# Patient Record
Sex: FEMALE | Race: WHITE | Employment: OTHER | URBAN - METROPOLITAN AREA
[De-identification: names, ages, dates, MRNs, and addresses within clinical notes are randomized per-mention and may not be internally consistent; named-entity substitution may affect disease eponyms.]

---

## 2017-01-13 ENCOUNTER — OFFICE VISIT (OUTPATIENT)
Dept: FAMILY MEDICINE CLINIC | Age: 62
End: 2017-01-13

## 2017-01-13 VITALS
DIASTOLIC BLOOD PRESSURE: 58 MMHG | RESPIRATION RATE: 16 BRPM | OXYGEN SATURATION: 93 % | HEART RATE: 104 BPM | HEIGHT: 64 IN | SYSTOLIC BLOOD PRESSURE: 112 MMHG | BODY MASS INDEX: 26.46 KG/M2 | TEMPERATURE: 98.5 F | WEIGHT: 155 LBS

## 2017-01-13 DIAGNOSIS — R05.9 COUGH: Primary | ICD-10-CM

## 2017-01-13 DIAGNOSIS — J11.1 INFLUENZA: ICD-10-CM

## 2017-01-13 NOTE — MR AVS SNAPSHOT
Visit Information Date & Time Provider Department Dept. Phone Encounter #  
 1/13/2017  9:45 AM Jarrell Mitchell MD [address] [place] [address] [phone] [id number] Follow-up Instructions Return if symptoms worsen or fail to improve. Upcoming Health Maintenance Date Due Hepatitis C Screening 1955 INFLUENZA AGE 9 TO ADULT 8/1/2016 COLONOSCOPY 1/1/2018 BREAST CANCER SCRN MAMMOGRAM 6/22/2018 PAP AKA CERVICAL CYTOLOGY 8/24/2019 DTaP/Tdap/Td series (2 - Td) 10/6/2025 Allergies as of 1/13/2017  Review Complete On: 1/13/2017 By: Shawanda Silva LPN Severity Noted Reaction Type Reactions Erythromycin  09/13/2016    Nausea Only Current Immunizations  Never Reviewed Name Date Influenza Vaccine 10/6/2015, 10/27/2014 Tdap 10/6/2015 Zoster Vaccine, Live 1/1/2012 Not reviewed this visit You Were Diagnosed With   
  
 Codes Comments Cough    -  Primary ICD-10-CM: C40 ICD-9-CM: 198. 2 Vitals BP Pulse Temp Resp Height(growth percentile) Weight(growth percentile) 112/58 (!) 104 98.5 °F (36.9 °C) (Oral) 16 5' 4\" (1.626 m) 155 lb (70.3 kg) SpO2 BMI OB Status Smoking Status 93% 26.61 kg/m2 Postmenopausal Never Smoker BMI and BSA Data Body Mass Index Body Surface Area  
 26.61 kg/m 2 1.78 m 2 Preferred Pharmacy Pharmacy Name Phone [facility] [address] [place] [address] [address] [facility] [phone] Your Updated Medication List  
  
   
This list is accurate as of: 1/13/17 11:14 AM.  Always use your most recent med list. amLODIPine 5 mg tablet Commonly known as:  [place] Take 1 Tab by mouth daily. diclofenac EC 75 mg EC tablet Commonly known as:  VOLTAREN Take 1 Tab by mouth two (2) times daily as needed. hydroCHLOROthiazide 25 mg tablet Commonly known as:  HYDRODIURIL  
 Take 1 Tab by mouth daily. losartan 25 mg tablet Commonly known as:  COZAAR Take 1 Tab by mouth daily. omeprazole 20 mg capsule Commonly known as:  PRILOSEC Take 1 Cap by mouth daily. (attempting to wean off) Potassium Gluconate 595 mg (99 mg) tablet Take 1 Tab by mouth daily. Replaces Kdur due to cost  
  
 venlafaxine-SR 37.5 mg capsule Commonly known as:  EFFEXOR-XR TK 1 C PO D Follow-up Instructions Return if symptoms worsen or fail to improve. To-Do List   
 01/13/2017 Imaging:  XR CHEST PA LAT   
  
 01/13/2017 11:15 AM  
  Appointment with [facility] RAD XR RM 1 at [facility] ([phone]) Patient Instructions Cough: Care Instructions Your Care Instructions A cough is your body's response to something that bothers your throat or airways. Many things can cause a cough. You might cough because of a cold or the flu, bronchitis, or asthma. Smoking, postnasal drip, allergies, and stomach acid that backs up into your throat also can cause coughs. A cough is a symptom, not a disease. Most coughs stop when the cause, such as a cold, goes away. You can take a few steps at home to cough less and feel better. Follow-up care is a key part of your treatment and safety. Be sure to make and go to all appointments, and call your doctor if you are having problems. It's also a good idea to know your test results and keep a list of the medicines you take. How can you care for yourself at home? · Drink lots of water and other fluids. This helps thin the mucus and soothes a dry or sore throat. Honey or lemon juice in hot water or tea may ease a dry cough. · Take cough medicine as directed by your doctor. · Prop up your head on pillows to help you breathe and ease a dry cough. · Try cough drops to soothe a dry or sore throat. Cough drops don't stop a cough.  Medicine-flavored cough drops are no better than candy-flavored drops or hard candy. · Do not smoke. Avoid secondhand smoke. If you need help quitting, talk to your doctor about stop-smoking programs and medicines. These can increase your chances of quitting for good. When should you call for help? Call 911 anytime you think you may need emergency care. For example, call if: 
· You have severe trouble breathing. Call your doctor now or seek immediate medical care if: 
· You cough up blood. · You have new or worse trouble breathing. · You have a new or higher fever. · You have a new rash. Watch closely for changes in your health, and be sure to contact your doctor if: 
· You cough more deeply or more often, especially if you notice more mucus or a change in the color of your mucus. · You have new symptoms, such as a sore throat, an earache, or sinus pain. · You do not get better as expected. Where can you learn more? Go to http://wilberTalkyLandcj.info/. Enter D279 in the search box to learn more about \"Cough: Care Instructions. \" Current as of: May 27, 2016 Content Version: 11.1 © 1059-2667 [employer]dy[employer]. Care instructions adapted under license by [employer] (which disclaims liability or warranty for this information). If you have questions about a medical condition or this instruction, always ask your healthcare professional. [place] [address] any warranty or liability for your use of this information. Influenza (Flu): Care Instructions Your Care Instructions Influenza (flu) is an infection in the lungs and breathing passages. It is caused by the influenza virus. There are different strains, or types, of the flu virus from year to year. Unlike the common cold, the flu comes on suddenly and the symptoms, such as a cough, congestion, fever, chills, fatigue, aches, and pains, are more severe. These symptoms may last up to 10 days.  Although the flu can make you feel very sick, it usually doesn't cause serious health problems. Home treatment is usually all you need for flu symptoms. But your doctor may prescribe antiviral medicine to prevent other health problems, such as pneumonia, from developing. Older people and those who have a long-term health condition, such as lung disease, are most at risk for having pneumonia or other health problems. Follow-up care is a key part of your treatment and safety. Be sure to make and go to all appointments, and call your doctor if you are having problems. Its also a good idea to know your test results and keep a list of the medicines you take. How can you care for yourself at home? · Get plenty of rest. 
· Drink plenty of fluids, enough so that your urine is light yellow or clear like water. If you have kidney, heart, or liver disease and have to limit fluids, talk with your doctor before you increase the amount of fluids you drink. · Take an over-the-counter pain medicine if needed, such as acetaminophen (Tylenol), ibuprofen (Advil, Motrin), or naproxen (Aleve), to relieve fever, headache, and muscle aches. Read and follow all instructions on the label. No one younger than 20 should take aspirin. It has been linked to Reye syndrome, a serious illness. · Do not smoke. Smoking can make the flu worse. If you need help quitting, talk to your doctor about stop-smoking programs and medicines. These can increase your chances of quitting for good. · Breathe moist air from a hot shower or from a sink filled with hot water to help clear a stuffy nose. · Before you use cough and cold medicines, check the label. These medicines may not be safe for young children or for people with certain health problems. · If the skin around your nose and lips becomes sore, put some petroleum jelly on the area. · To ease coughing: ¨ Drink fluids to soothe a scratchy throat. ¨ Suck on cough drops or plain hard candy. ¨ Take an over-the-counter cough medicine that contains dextromethorphan to help you get some sleep. Read and follow all instructions on the label. ¨ Raise your head at night with an extra pillow. This may help you rest if coughing keeps you awake. · Take any prescribed medicine exactly as directed. Call your doctor if you think you are having a problem with your medicine. To avoid spreading the flu · Wash your hands regularly, and keep your hands away from your face. · Stay home from school, work, and other public places until you are feeling better and your fever has been gone for at least 24 hours. The fever needs to have gone away on its own without the help of medicine. · Ask people living with you to talk to their doctors about preventing the flu. They may get antiviral medicine to keep from getting the flu from you. · To prevent the flu in the future, get a flu vaccine every fall. Encourage people living with you to get the vaccine. · Cover your mouth when you cough or sneeze. When should you call for help? Call 911 anytime you think you may need emergency care. For example, call if: 
· You have severe trouble breathing. Call your doctor now or seek immediate medical care if: 
· You have new or worse trouble breathing. · You seem to be getting much sicker. · You feel very sleepy or confused. · You have a new or higher fever. · You get a new rash. Watch closely for changes in your health, and be sure to contact your doctor if: 
· You begin to get better and then get worse. · You are not getting better after 1 week. Where can you learn more? Go to http://wilber-cj.info/. Enter A046 in the search box to learn more about \"Influenza (Flu): Care Instructions. \" Current as of: May 23, 2016 Content Version: 11.1 © 0541-7323 [employer].  Care instructions adapted under license by [employer] (which disclaims liability or warranty for this information). If you have questions about a medical condition or this instruction, always ask your healthcare professional. [address] any warranty or liability for your use of this information. Introducing [facility] SERVICES! [facility] introduces [employer] patient portal. Now you can access parts of your medical record, email your doctor's office, and request medication refills online. 1. In your internet browser, go to [email or web address] 2. Click on the First Time User? Click Here link in the Sign In box. You will see the New Member Sign Up page. 3. Enter your [employer] Access Code exactly as it appears below. You will not need to use this code after youve completed the sign-up process. If you do not sign up before the expiration date, you must request a new code. · [employer] Access Code: [email or web address] Expires: 4/13/2017 11:14 AM 
 
4. Enter the last four digits of your Social Security Number (xxxx) and Date of Birth (mm/dd/yyyy) as indicated and click Submit. You will be taken to the next sign-up page. 5. Create a [employer] ID. This will be your [employer] login ID and cannot be changed, so think of one that is secure and easy to remember. 6. Create a [employer] password. You can change your password at any time. 7. Enter your Password Reset Question and Answer. This can be used at a later time if you forget your password. 8. Enter your e-mail address. You will receive e-mail notification when new information is available in [address] 9. Click Sign Up. You can now view and download portions of your medical record. 10. Click the Download Summary menu link to download a portable copy of your medical information. If you have questions, please visit the Frequently Asked Questions section of the [employer] website. Remember, [employer] is NOT to be used for urgent needs. For medical emergencies, dial 911. Now available from your iPhone and Android! Please provide this summary of care documentation to your next provider. Your primary care clinician is listed as Kathleen Warner. If you have any questions after today's visit, please call [phone].

## 2017-01-13 NOTE — PATIENT INSTRUCTIONS
Cough: Care Instructions  Your Care Instructions  A cough is your body's response to something that bothers your throat or airways. Many things can cause a cough. You might cough because of a cold or the flu, bronchitis, or asthma. Smoking, postnasal drip, allergies, and stomach acid that backs up into your throat also can cause coughs. A cough is a symptom, not a disease. Most coughs stop when the cause, such as a cold, goes away. You can take a few steps at home to cough less and feel better. Follow-up care is a key part of your treatment and safety. Be sure to make and go to all appointments, and call your doctor if you are having problems. It's also a good idea to know your test results and keep a list of the medicines you take. How can you care for yourself at home? · Drink lots of water and other fluids. This helps thin the mucus and soothes a dry or sore throat. Honey or lemon juice in hot water or tea may ease a dry cough. · Take cough medicine as directed by your doctor. · Prop up your head on pillows to help you breathe and ease a dry cough. · Try cough drops to soothe a dry or sore throat. Cough drops don't stop a cough. Medicine-flavored cough drops are no better than candy-flavored drops or hard candy. · Do not smoke. Avoid secondhand smoke. If you need help quitting, talk to your doctor about stop-smoking programs and medicines. These can increase your chances of quitting for good. When should you call for help? Call 911 anytime you think you may need emergency care. For example, call if:  · You have severe trouble breathing. Call your doctor now or seek immediate medical care if:  · You cough up blood. · You have new or worse trouble breathing. · You have a new or higher fever. · You have a new rash.   Watch closely for changes in your health, and be sure to contact your doctor if:  · You cough more deeply or more often, especially if you notice more mucus or a change in the color of your mucus. · You have new symptoms, such as a sore throat, an earache, or sinus pain. · You do not get better as expected. Where can you learn more? Go to http://wilber-cj.info/. Enter D279 in the search box to learn more about \"Cough: Care Instructions. \"  Current as of: May 27, 2016  Content Version: 11.1  © 2006-2016 King Solar[employer]. Care instructions adapted under license by [employer] (which disclaims liability or warranty for this information). If you have questions about a medical condition or this instruction, always ask your healthcare professional. [place] [address] any warranty or liability for your use of this information. Influenza (Flu): Care Instructions  Your Care Instructions  Influenza (flu) is an infection in the lungs and breathing passages. It is caused by the influenza virus. There are different strains, or types, of the flu virus from year to year. Unlike the common cold, the flu comes on suddenly and the symptoms, such as a cough, congestion, fever, chills, fatigue, aches, and pains, are more severe. These symptoms may last up to 10 days. Although the flu can make you feel very sick, it usually doesn't cause serious health problems. Home treatment is usually all you need for flu symptoms. But your doctor may prescribe antiviral medicine to prevent other health problems, such as pneumonia, from developing. Older people and those who have a long-term health condition, such as lung disease, are most at risk for having pneumonia or other health problems. Follow-up care is a key part of your treatment and safety. Be sure to make and go to all appointments, and call your doctor if you are having problems. Its also a good idea to know your test results and keep a list of the medicines you take. How can you care for yourself at home?   · Get plenty of rest.  · Drink plenty of fluids, enough so that your urine is light yellow or clear like water. If you have kidney, heart, or liver disease and have to limit fluids, talk with your doctor before you increase the amount of fluids you drink. · Take an over-the-counter pain medicine if needed, such as acetaminophen (Tylenol), ibuprofen (Advil, Motrin), or naproxen (Aleve), to relieve fever, headache, and muscle aches. Read and follow all instructions on the label. No one younger than 20 should take aspirin. It has been linked to Reye syndrome, a serious illness. · Do not smoke. Smoking can make the flu worse. If you need help quitting, talk to your doctor about stop-smoking programs and medicines. These can increase your chances of quitting for good. · Breathe moist air from a hot shower or from a sink filled with hot water to help clear a stuffy nose. · Before you use cough and cold medicines, check the label. These medicines may not be safe for young children or for people with certain health problems. · If the skin around your nose and lips becomes sore, put some petroleum jelly on the area. · To ease coughing:  ¨ Drink fluids to soothe a scratchy throat. ¨ Suck on cough drops or plain hard candy. ¨ Take an over-the-counter cough medicine that contains dextromethorphan to help you get some sleep. Read and follow all instructions on the label. ¨ Raise your head at night with an extra pillow. This may help you rest if coughing keeps you awake. · Take any prescribed medicine exactly as directed. Call your doctor if you think you are having a problem with your medicine. To avoid spreading the flu  · Wash your hands regularly, and keep your hands away from your face. · Stay home from school, work, and other public places until you are feeling better and your fever has been gone for at least 24 hours. The fever needs to have gone away on its own without the help of medicine. · Ask people living with you to talk to their doctors about preventing the flu.  They may get antiviral medicine to keep from getting the flu from you. · To prevent the flu in the future, get a flu vaccine every fall. Encourage people living with you to get the vaccine. · Cover your mouth when you cough or sneeze. When should you call for help? Call 911 anytime you think you may need emergency care. For example, call if:  · You have severe trouble breathing. Call your doctor now or seek immediate medical care if:  · You have new or worse trouble breathing. · You seem to be getting much sicker. · You feel very sleepy or confused. · You have a new or higher fever. · You get a new rash. Watch closely for changes in your health, and be sure to contact your doctor if:  · You begin to get better and then get worse. · You are not getting better after 1 week. Where can you learn more? Go to http://wilber-cj.info/. Enter S463 in the search box to learn more about \"Influenza (Flu): Care Instructions. \"  Current as of: May 23, 2016  Content Version: 11.1  © 2070-9339 [employer]. Care instructions adapted under license by [employer] (which disclaims liability or warranty for this information). If you have questions about a medical condition or this instruction, always ask your healthcare professional. [address] any warranty or liability for your use of this information.

## 2017-01-13 NOTE — PROGRESS NOTES
Chief Complaint   Patient presents with    Cough     times 10 days     1. Have you been to the ER, urgent care clinic since your last visit? Hospitalized since your last visit? No    2. Have you seen or consulted any other health care providers outside of the [address] [place] [address] since your last visit? Include any pap smears or colon screening.  No

## 2017-01-13 NOTE — PROGRESS NOTES
HPI  Kayla Mccarthy is a 57 y.o. female who presents for f/u of flu. Sxs of a cold and fatigue started on 1/2 and she was diagnosed with the flu at [facility] last Thursday 1/5 (she was tested for flu and strep). Was told it would take 2 weeks to go away. She feels the same since last Thursday. The most bothersome things are low energy and occasional coughing of phlegm. Occasional rumbling in her chest. Trouble getting to sleep at night. Hasn't had much of an appetite. No fevers, chills, SOB, abdominal pain. Has been taking BP medication irregularly while sick. Has been using coricidin and mucinex.  has a cough but not as bad as hers. This is first year she didn't get flu shot. Has h/o asthma set off by cats, dogs. No h/o pneumonia or recent hospitalizations. No tobacco, alcohol, drug use. Allergies: Allergies   Allergen Reactions    Erythromycin Nausea Only       Meds:   Current Outpatient Prescriptions   Medication Sig Dispense Refill    Potassium Gluconate 595 mg (99 mg) tablet Take 1 Tab by mouth daily. Replaces Kdur due to cost 90 Tab 3    venlafaxine-SR (EFFEXOR-XR) 37.5 mg capsule TK 1 C PO D  1    omeprazole (PRILOSEC) 20 mg capsule Take 1 Cap by mouth daily. (attempting to wean off) 30 Cap 6    hydrochlorothiazide (HYDRODIURIL) 25 mg tablet Take 1 Tab by mouth daily. 90 Tab 3    losartan (COZAAR) 25 mg tablet Take 1 Tab by mouth daily. 90 Tab 3    amLODIPine (NORVASC) 5 mg tablet Take 1 Tab by mouth daily. 90 Tab 3    diclofenac EC (VOLTAREN) 75 mg EC tablet Take 1 Tab by mouth two (2) times daily as needed.  27 Tab 6       PMH:  Past Medical History   Diagnosis Date    Hypertension        SH:  Smoker:  History   Smoking Status    Never Smoker   Smokeless Tobacco    Not on file       ETOH:   History   Alcohol Use    0.6 oz/week    1 Glasses of wine per week     Comment: rarely       FH:   Family History   Problem Relation Age of Onset    Breast Cancer Mother 59    Hypertension Father     Cancer Sister     Colon Cancer Sister     Psychiatric Disorder Brother      schizophrenia    Cancer Other     Colon Cancer Other        ROS: Positive for items in bold, otherwise reviewed and negative:   Constitutional: headache, fever, fatigue     Cardiac: chest pain      Resp: cough or shortness of breath      GI: nausea, vomiting, constipation, diarrhea    Physical Exam:  Visit Vitals    /58    Pulse (!) 104    Temp 98.5 °F (36.9 °C) (Oral)    Resp 16    Ht 5' 4\" (1.626 m)    Wt 155 lb (70.3 kg)    SpO2 93%    BMI 26.61 kg/m2       Gen: No apparent distress. Alert and oriented and responds to all questions appropriately. Eyes: Conjunctiva clear, PERRL, EOMI  Ear: External ears are normal. Hearing grossly normal b/l. Tympanic membranes are clear and without effusion. Nose: Turbinates are within normal limits. No drainage. Oropharynx: No oral lesions or exudates. Neck: Supple, no lymph nodes, masses, or thyromegaly appreciated  Lungs: Respirations unlabored, clear to auscultation bilaterally  Cardio: Regular, rate, and rhythm without murmurs, rubs, or gallops   Neuro: Gait grossly intact. CXR reviewed and did not appear to show pneumonia or other acute pathology. Assessment and Plan:   Virgen Abebe is a 57 y.o. female who presents for f/u of flu.    1. Influenza: Afebrile, reassuring PE. CXR negative for superimposed pneumonia. -Emphasized importance of supportive care  -Rtc prn if sxs worsen or fail to improve    Discussed diagnoses in detail with patient. Patient expressed understanding of and agreement to above plan. All questions and concerns addressed. Medication risks/benefits/side effects discussed with patient. Patient is counseled to return to the office and/or ED if symptoms do not improve as expected. Patient discussed with Dr. Malik Woodard, Attending Physician.     Fahad Alba MD  Family Medicine Resident, PGY-2      Encounter Diagnoses:    ICD-10-CM ICD-9-CM    1. Cough R05 786.2 XR CHEST PA LAT   2.  Influenza J11.1 487.1

## 2017-01-16 ENCOUNTER — TELEPHONE (OUTPATIENT)
Dept: FAMILY MEDICINE CLINIC | Age: 62
End: 2017-01-16

## 2017-01-16 NOTE — TELEPHONE ENCOUNTER
Patient called for update as leaving out of town today at 4:00 pm.  Ph [phone]   Moi Brasher she will have her cell phone with her.

## 2017-01-16 NOTE — TELEPHONE ENCOUNTER
Patient called stating that she had a CXR done on Friday and would like a return call with the results.

## 2017-01-28 RX ORDER — POTASSIUM CHLORIDE 750 MG/1
TABLET, EXTENDED RELEASE ORAL
Qty: 180 TAB | Refills: 0 | Status: SHIPPED | OUTPATIENT
Start: 2017-01-28 | End: 2017-05-08 | Stop reason: SDUPTHER

## 2017-05-08 RX ORDER — POTASSIUM CHLORIDE 750 MG/1
TABLET, EXTENDED RELEASE ORAL
Qty: 180 TAB | Refills: 0 | Status: SHIPPED | OUTPATIENT
Start: 2017-05-08 | End: 2017-08-04 | Stop reason: SDUPTHER

## 2017-05-19 ENCOUNTER — HOSPITAL ENCOUNTER (OUTPATIENT)
Dept: LAB | Age: 62
Discharge: HOME | End: 2017-05-19
Payer: COMMERCIAL

## 2017-05-19 ENCOUNTER — OFFICE VISIT (OUTPATIENT)
Dept: FAMILY MEDICINE CLINIC | Age: 62
End: 2017-05-19

## 2017-05-19 VITALS
SYSTOLIC BLOOD PRESSURE: 106 MMHG | TEMPERATURE: 98 F | HEIGHT: 64 IN | HEART RATE: 76 BPM | BODY MASS INDEX: 28.34 KG/M2 | RESPIRATION RATE: 16 BRPM | OXYGEN SATURATION: 97 % | WEIGHT: 166 LBS | DIASTOLIC BLOOD PRESSURE: 66 MMHG

## 2017-05-19 DIAGNOSIS — Z01.419 WELL WOMAN EXAM WITH ROUTINE GYNECOLOGICAL EXAM: Primary | ICD-10-CM

## 2017-05-19 DIAGNOSIS — M54.30 SCIATIC LEG PAIN: ICD-10-CM

## 2017-05-19 DIAGNOSIS — I10 ESSENTIAL HYPERTENSION: ICD-10-CM

## 2017-05-19 PROCEDURE — 88175 CYTOPATH C/V AUTO FLUID REDO: CPT | Performed by: FAMILY MEDICINE

## 2017-05-19 PROCEDURE — 87624 HPV HI-RISK TYP POOLED RSLT: CPT | Performed by: FAMILY MEDICINE

## 2017-05-19 NOTE — PROGRESS NOTES
Chief Complaint   Patient presents with    Complete Physical     1. Have you been to the ER, urgent care clinic since your last visit? Hospitalized since your last visit? No    2. Have you seen or consulted any other health care providers outside of the [address] [place] [address] since your last visit? Include any pap smears or colon screening.  No     Left leg sciatic nerve pain--3 years ago--going to chiropractor--Dr Leticia Storey    Skin cancer--basal cell--

## 2017-05-19 NOTE — PATIENT INSTRUCTIONS
Dr. Jae Licona   (Sports Medicine)     Piriformis Syndrome: Exercises  Your Care Instructions  Here are some examples of typical rehabilitation exercises for your condition. Start each exercise slowly. Ease off the exercise if you start to have pain. Your doctor or physical therapist will tell you when you can start these exercises and which ones will work best for you. How to do the exercises  Hip rotator stretch    1. Lie on your back with both knees bent and your feet flat on the floor. 2. Put the ankle of your affected leg on your opposite thigh near your knee. 3. Use your hand to gently push your knee (on your affected leg) away from your body until you feel a gentle stretch around your hip. 4. Hold the stretch for 15 to 30 seconds. 5. Repeat 2 to 4 times. 6. Switch legs and repeat steps 1 through 5. Piriformis stretch    1. Lie on your back with your legs straight. 2. Lift your affected leg and bend your knee. With your opposite hand, reach across your body, and then gently pull your knee toward your opposite shoulder. 3. Hold the stretch for 15 to 30 seconds. 4. Repeat with your other leg. 5. Repeat 2 to 4 times on each side. Lower abdominal strengthening    1. Lie on your back with your knees bent and your feet flat on the floor. 2. Tighten your belly muscles by pulling your belly button in toward your spine. 3. Lift one foot off the floor and bring your knee toward your chest, so that your knee is straight above your hip and your leg is bent like the letter \"L. \"  4. Lift the other knee up to the same position. 5. Lower one leg at a time to the starting position. 6. Keep alternating legs until you have lifted each leg 8 to 12 times. 7. Be sure to keep your belly muscles tight and your back still as you are moving your legs. Be sure to breathe normally. Follow-up care is a key part of your treatment and safety.  Be sure to make and go to all appointments, and call your doctor if you are having problems. It's also a good idea to know your test results and keep a list of the medicines you take. Where can you learn more? Go to http://wilber-cj.info/. Enter J591 in the search box to learn more about \"Piriformis Syndrome: Exercises. \"  Current as of: May 23, 2016  Content Version: 11.2  © 0973-1064 [employer]. Care instructions adapted under license by [employer] [facility] (which disclaims liability or warranty for this information). If you have questions about a medical condition or this instruction, always ask your healthcare professional. [place] [address] any warranty or liability for your use of this information. Well Visit, Women 48 to 72: Care Instructions  Your Care Instructions  Physical exams can help you stay healthy. Your doctor has checked your overall health and may have suggested ways to take good care of yourself. He or she also may have recommended tests. At home, you can help prevent illness with healthy eating, regular exercise, and other steps. Follow-up care is a key part of your treatment and safety. Be sure to make and go to all appointments, and call your doctor if you are having problems. It's also a good idea to know your test results and keep a list of the medicines you take. How can you care for yourself at home? · Reach and stay at a healthy weight. This will lower your risk for many problems, such as obesity, diabetes, heart disease, and high blood pressure. · Get at least 30 minutes of exercise on most days of the week. Walking is a good choice. You also may want to do other activities, such as running, swimming, cycling, or playing tennis or team sports. · Do not smoke. Smoking can make health problems worse. If you need help quitting, talk to your doctor about stop-smoking programs and medicines. These can increase your chances of quitting for good. · Protect your skin from too much sun.  When you're outdoors from 10 a.m. to 4 p.m., stay in the shade or cover up with clothing and a hat with a wide brim. Wear sunglasses that block UV rays. Even when it's cloudy, put broad-spectrum sunscreen (SPF 30 or higher) on any exposed skin. · See a dentist one or two times a year for checkups and to have your teeth cleaned. · Wear a seat belt in the car. · Limit alcohol to 1 drink a day. Too much alcohol can cause health problems. Follow your doctor's advice about when to have certain tests. These tests can spot problems early. · Cholesterol. Your doctor will tell you how often to have this done based on your age, family history, or other things that can increase your risk for heart attack and stroke. · Blood pressure. Have your blood pressure checked during a routine doctor visit. Your doctor will tell you how often to check your blood pressure based on your age, your blood pressure results, and other factors. · Mammogram. Ask your doctor how often you should have a mammogram, which is an X-ray of your breasts. A mammogram can spot breast cancer before it can be felt and when it is easiest to treat. · Pap test and pelvic exam. Ask your doctor how often you should have a Pap test. You may not need to have a Pap test as often as you used to. · Vision. Have your eyes checked every year or two or as often as your doctor suggests. Some experts recommend that you have yearly exams for glaucoma and other age-related eye problems starting at age 48. · Hearing. Tell your doctor if you notice any change in your hearing. You can have tests to find out how well you hear. · Diabetes. Ask your doctor whether you should have tests for diabetes. · Colon cancer. You should begin tests for colon cancer at age 48. You may have one of several tests. Your doctor will tell you how often to have tests based on your age and risk.  Risks include whether you already had a precancerous polyp removed from your colon or whether your parents, sisters and brothers, or children have had colon cancer. · Thyroid disease. Talk to your doctor about whether to have your thyroid checked as part of a regular physical exam. Women have an increased chance of a thyroid problem. · Osteoporosis. You should begin tests for bone density at age 72. If you are younger than 72, ask your doctor whether you have factors that may increase your risk for this disease. You may want to have this test before age 72. · Heart attack and stroke risk. At least every 4 to 6 years, you should have your risk for heart attack and stroke assessed. Your doctor uses factors such as your age, blood pressure, cholesterol, and whether you smoke or have diabetes to show what your risk for a heart attack or stroke is over the next 10 years. When should you call for help? Watch closely for changes in your health, and be sure to contact your doctor if you have any problems or symptoms that concern you. Where can you learn more? Go to http://wilber-cj.info/. Enter U360 in the search box to learn more about \"Well Visit, Women 50 to 72: Care Instructions. \"  Current as of: July 19, 2016  Content Version: 11.2  © 6570-2206 [employer]. Care instructions adapted under license by [employer] (which disclaims liability or warranty for this information). If you have questions about a medical condition or this instruction, always ask your healthcare professional. [place] [address] any warranty or liability for your use of this information.

## 2017-05-19 NOTE — PROGRESS NOTES
HPI:  Bryce Dan is a 57 y.o. female presenting for well woman exam.     Last menstrual period was age 42    1 , 1   Sexually active?: \"not very much\"  Number of sexual partners: one ()    Diet: not enough fruits and vegetables, tries to eat healthy though  Exercise: enjoys Pilates and bike    Health Maintenance reviewed -  Pap smear at least 3 years ago, always normal  Mammogram due in   Colonoscopy 2017, next in 3 years due to family history  HIV testing declines  Hepatitis C testing declines  Lung cancer screening N/A      Allergies- reviewed: Allergies   Allergen Reactions    Erythromycin Nausea Only         Medications- reviewed:   Current Outpatient Prescriptions   Medication Sig    potassium chloride (KLOR-CON) 10 mEq tablet TAKE 1 CAPSULE BY MOUTH TWICE DAILY    venlafaxine-SR (EFFEXOR-XR) 37.5 mg capsule TK 1 C PO D    omeprazole (PRILOSEC) 20 mg capsule Take 1 Cap by mouth daily. (attempting to wean off)    hydrochlorothiazide (HYDRODIURIL) 25 mg tablet Take 1 Tab by mouth daily.  losartan (COZAAR) 25 mg tablet Take 1 Tab by mouth daily.  amLODIPine (NORVASC) 5 mg tablet Take 1 Tab by mouth daily.  diclofenac EC (VOLTAREN) 75 mg EC tablet Take 1 Tab by mouth two (2) times daily as needed. No current facility-administered medications for this visit.           Past Medical History- reviewed:  Past Medical History:   Diagnosis Date    Hypertension          Past Surgical History- reviewed:   Past Surgical History:   Procedure Laterality Date    HX  SECTION           Family History - reviewed:  Family History   Problem Relation Age of Onset    Breast Cancer Mother 59    Hypertension Father     Cancer Sister     Colon Cancer Sister     Psychiatric Disorder Brother      schizophrenia    Cancer Other     Colon Cancer Other          Social History - reviewed:  Social History     Social History    Marital status:      Spouse name: N/A    Number of children: N/A    Years of education: N/A     Occupational History    Not on file.      Social History Main Topics    Smoking status: Never Smoker    Smokeless tobacco: Not on file    Alcohol use 0.6 oz/week     1 Glasses of wine per week      Comment: rarely    Drug use: No    Sexual activity: No     Other Topics Concern    Not on file     Social History Narrative    Lives with     Daughter Pedro Garcia  2014 from colon cancer soon after having his son    Works at home but still works in [employer] 2 days per week,          Immunizations - reviewed:   Immunization History   Administered Date(s) Administered    Influenza Vaccine 10/27/2014, 10/06/2015    Tdap 10/06/2015    Zoster Vaccine, Live 2012     Flu: yes  Tdap:   Zostervax:       Review of Systems   CONSTITUTIONAL: Denies: fever, chills  EYES: Denies: blurry vision, decreased vision  ENT: Denies: sore throat, nasal congestion  CARDIOVASCULAR: Denies: chest pain, dyspnea on exertion  GI: Denies: nausea, vomiting, diarrhea  : Denies: dysuria, frequency/urgency, hematuria  MUSCULOSKELETAL: sciatica, DDD, curvature of spine    PSYCH: has had times of stress and depression on and off, stable on Effexor   BREASTS: No masses or nipple discharge      Physical Exam  Visit Vitals    /66    Pulse 76    Temp 98 °F (36.7 °C) (Oral)    Resp 16    Ht 5' 4\" (1.626 m)    Wt 166 lb (75.3 kg)    SpO2 97%    BMI 28.49 kg/m2       General appearance - alert, well appearing, and in no distress  Eyes - pupils equal and reactive, extraocular eye movements intact  Ears - bilateral TM's and external ear canals normal  Nose - normal and patent, no erythema, discharge or polyps  Mouth - mucous membranes moist, pharynx normal without lesions  Neck - supple, no significant adenopathy  Chest - clear to auscultation, no wheezes, rales or rhonchi, symmetric air entry  Heart - normal rate, regular rhythm, normal S1, S2, no murmurs, rubs, clicks or gallops  Abdomen - soft, nontender, nondistended, no masses or organomegaly  Neurological - alert, oriented, normal speech, no focal findings or movement disorder noted  Musculoskeletal - no joint tenderness, deformity or swelling  Extremities - peripheral pulses normal, no pedal edema, no clubbing or cyanosis  Skin - normal coloration and turgor, no rashes, no suspicious skin lesions noted  Pelvic - Exam chaperoned by Vivi Wetzel LPN. External genitalia normal without rashes or lesions. Mildly atrophic vaginal tissue. Cervix without lesions or abnormal discharge. Uterus non tender and normal size. No adnexal masses or tenderness. Breast - deferred       Assessment/Plan:    ICD-10-CM ICD-9-CM    1. Well woman exam with routine gynecological exam M22.261 W96.51 METABOLIC PANEL, COMPREHENSIVE      LIPID PANEL      MICROALBUMIN, UR, RAND W/ MICROALBUMIN/CREA RATIO      PAP IG, APTIMA HPV AND RFX 16/18,45 ([id number])      DEB MAMMO BI SCREENING INCL CAD   2. Essential hypertension O87 860.8 METABOLIC PANEL, COMPREHENSIVE      LIPID PANEL      MICROALBUMIN, UR, RAND W/ MICROALBUMIN/CREA RATIO   3. Sciatic leg pain M54.30 724.3          · Counseled re: diet, exercise, healthy lifestyle  · Appropriate labs, vaccines, imaging studies, and referrals ordered as listed above  · Mammogram ordered today. Breast exam deferred. The American Cancer Society recommends not performing clinical breast examination, given the potential for false-positive findings and lack of evidence for improved outcomes. The USPSTF states that evidence is insufficient to assess additional benefits of clinical breast examination beyond mammography. · BP well controlled, labs as above, continue current regimen   · Pt currently going to chiropractor for sciatica pain. I have given her specific targeted exercises to try. Have offered PT referral and she will let me know if she desires this in the future.     Follow-up Disposition: Not on File      I have discussed the diagnosis with the patient and the intended plan as seen in the above orders. The patient has received an after-visit summary and questions were answered concerning future plans. I have discussed medication side effects and warnings with the patient as well. Informed pt to return to the office if new symptoms arise.       Francis Wood, DO

## 2017-05-19 NOTE — MR AVS SNAPSHOT
Visit Information Date & Time Provider Department Dept. Phone Encounter #  
 5/19/2017  9:00 AM Raji Griffin, [facility] [phone] [id number] Upcoming Health Maintenance Date Due Hepatitis C Screening 1955 INFLUENZA AGE 9 TO ADULT 8/1/2017 COLONOSCOPY 1/1/2018 BREAST CANCER SCRN MAMMOGRAM 6/22/2018 PAP AKA CERVICAL CYTOLOGY 8/24/2019 DTaP/Tdap/Td series (2 - Td) 10/6/2025 Allergies as of 5/19/2017  Review Complete On: 5/19/2017 By: Raji Griffin DO Severity Noted Reaction Type Reactions Erythromycin  09/13/2016    Nausea Only Current Immunizations  Never Reviewed Name Date Influenza Vaccine 10/6/2015, 10/27/2014 Tdap 10/6/2015 Zoster Vaccine, Live 1/1/2012 Not reviewed this visit You Were Diagnosed With   
  
 Codes Comments Well woman exam with routine gynecological exam    -  Primary ICD-10-CM: I92.308 ICD-9-CM: V72.31 Essential hypertension     ICD-10-CM: I10 
ICD-9-CM: 401.9 Vitals BP Pulse Temp Resp Height(growth percentile) Weight(growth percentile) 106/66 76 98 °F (36.7 °C) (Oral) 16 5' 4\" (1.626 m) 166 lb (75.3 kg) SpO2 BMI OB Status Smoking Status 97% 28.49 kg/m2 Postmenopausal Never Smoker Vitals History BMI and BSA Data Body Mass Index Body Surface Area  
 28.49 kg/m 2 1.84 m 2 Preferred Pharmacy Pharmacy Name Phone [facility] [address] AT [address] [facility] [phone] Your Updated Medication List  
  
   
This list is accurate as of: 5/19/17  9:46 AM.  Always use your most recent med list. amLODIPine 5 mg tablet Commonly known as:  Corinne Amira Take 1 Tab by mouth daily. diclofenac EC 75 mg EC tablet Commonly known as:  VOLTAREN Take 1 Tab by mouth two (2) times daily as needed. hydroCHLOROthiazide 25 mg tablet Commonly known as:  HYDRODIURIL Take 1 Tab by mouth daily. losartan 25 mg tablet Commonly known as:  COZAAR Take 1 Tab by mouth daily. omeprazole 20 mg capsule Commonly known as:  PRILOSEC Take 1 Cap by mouth daily. (attempting to wean off) potassium chloride 10 mEq tablet Commonly known as:  KLOR-CON  
TAKE 1 CAPSULE BY MOUTH TWICE DAILY  
  
 venlafaxine-SR 37.5 mg capsule Commonly known as:  EFFEXOR-XR TK 1 C PO D We Performed the Following LIPID PANEL [68398 CPT(R)] METABOLIC PANEL, COMPREHENSIVE [53488 CPT(R)] MICROALBUMIN, UR, RAND W/ MICROALBUMIN/CREA RATIO [id number] CPT(R)] PAP IG, APTIMA HPV AND RFX 16/18,45 (638070) [[id number] Custom] Patient Instructions Dr. Tanika Meyers (Sports Medicine) Piriformis Syndrome: Exercises Your Care Instructions Here are some examples of typical rehabilitation exercises for your condition. Start each exercise slowly. Ease off the exercise if you start to have pain. Your doctor or physical therapist will tell you when you can start these exercises and which ones will work best for you. How to do the exercises Hip rotator stretch 1. Lie on your back with both knees bent and your feet flat on the floor. 2. Put the ankle of your affected leg on your opposite thigh near your knee. 3. Use your hand to gently push your knee (on your affected leg) away from your body until you feel a gentle stretch around your hip. 4. Hold the stretch for 15 to 30 seconds. 5. Repeat 2 to 4 times. 6. Switch legs and repeat steps 1 through 5. Piriformis stretch 1. Lie on your back with your legs straight. 2. Lift your affected leg and bend your knee. With your opposite hand, reach across your body, and then gently pull your knee toward your opposite shoulder. 3. Hold the stretch for 15 to 30 seconds. 4. Repeat with your other leg. 5. Repeat 2 to 4 times on each side. Lower abdominal strengthening 1. Lie on your back with your knees bent and your feet flat on the floor. 2. Tighten your belly muscles by pulling your belly button in toward your spine. 3. Lift one foot off the floor and bring your knee toward your chest, so that your knee is straight above your hip and your leg is bent like the letter \"L. \" 
4. Lift the other knee up to the same position. 5. Lower one leg at a time to the starting position. 6. Keep alternating legs until you have lifted each leg 8 to 12 times. 7. Be sure to keep your belly muscles tight and your back still as you are moving your legs. Be sure to breathe normally. Follow-up care is a key part of your treatment and safety. Be sure to make and go to all appointments, and call your doctor if you are having problems. It's also a good idea to know your test results and keep a list of the medicines you take. Where can you learn more? Go to http://wilber-cj.info/. Enter B471 in the search box to learn more about \"Piriformis Syndrome: Exercises. \" Current as of: May 23, 2016 Content Version: 11.2 © 9468-8328 [employer] Care instructions adapted under license by [employer] (which disclaims liability or warranty for this information). If you have questions about a medical condition or this instruction, always ask your healthcare professional. [place] [address] any warranty or liability for your use of this information. Well Visit, Women 48 to 72: Care Instructions Your Care Instructions Physical exams can help you stay healthy. Your doctor has checked your overall health and may have suggested ways to take good care of yourself. He or she also may have recommended tests. At home, you can help prevent illness with healthy eating, regular exercise, and other steps. Follow-up care is a key part of your treatment and safety.  Be sure to make and go to all appointments, and call your doctor if you are having problems. It's also a good idea to know your test results and keep a list of the medicines you take. How can you care for yourself at home? · Reach and stay at a healthy weight. This will lower your risk for many problems, such as obesity, diabetes, heart disease, and high blood pressure. · Get at least 30 minutes of exercise on most days of the week. Walking is a good choice. You also may want to do other activities, such as running, swimming, cycling, or playing tennis or team sports. · Do not smoke. Smoking can make health problems worse. If you need help quitting, talk to your doctor about stop-smoking programs and medicines. These can increase your chances of quitting for good. · Protect your skin from too much sun. When you're outdoors from 10 a.m. to 4 p.m., stay in the shade or cover up with clothing and a hat with a wide brim. Wear sunglasses that block UV rays. Even when it's cloudy, put broad-spectrum sunscreen (SPF 30 or higher) on any exposed skin. · See a dentist one or two times a year for checkups and to have your teeth cleaned. · Wear a seat belt in the car. · Limit alcohol to 1 drink a day. Too much alcohol can cause health problems. Follow your doctor's advice about when to have certain tests. These tests can spot problems early. · Cholesterol. Your doctor will tell you how often to have this done based on your age, family history, or other things that can increase your risk for heart attack and stroke. · Blood pressure. Have your blood pressure checked during a routine doctor visit. Your doctor will tell you how often to check your blood pressure based on your age, your blood pressure results, and other factors. · Mammogram. Ask your doctor how often you should have a mammogram, which is an X-ray of your breasts. A mammogram can spot breast cancer before it can be felt and when it is easiest to treat.  
· Pap test and pelvic exam. Ask your doctor how often you should have a Pap test. You may not need to have a Pap test as often as you used to. · Vision. Have your eyes checked every year or two or as often as your doctor suggests. Some experts recommend that you have yearly exams for glaucoma and other age-related eye problems starting at age 48. · Hearing. Tell your doctor if you notice any change in your hearing. You can have tests to find out how well you hear. · Diabetes. Ask your doctor whether you should have tests for diabetes. · Colon cancer. You should begin tests for colon cancer at age 48. You may have one of several tests. Your doctor will tell you how often to have tests based on your age and risk. Risks include whether you already had a precancerous polyp removed from your colon or whether your parents, sisters and brothers, or children have had colon cancer. · Thyroid disease. Talk to your doctor about whether to have your thyroid checked as part of a regular physical exam. Women have an increased chance of a thyroid problem. · Osteoporosis. You should begin tests for bone density at age 72. If you are younger than 72, ask your doctor whether you have factors that may increase your risk for this disease. You may want to have this test before age 72. · Heart attack and stroke risk. At least every 4 to 6 years, you should have your risk for heart attack and stroke assessed. Your doctor uses factors such as your age, blood pressure, cholesterol, and whether you smoke or have diabetes to show what your risk for a heart attack or stroke is over the next 10 years. When should you call for help? Watch closely for changes in your health, and be sure to contact your doctor if you have any problems or symptoms that concern you. Where can you learn more? Go to http://wilber-cj.info/. Enter E401 in the search box to learn more about \"Well Visit, Women 50 to 72: Care Instructions. \" Current as of: July 19, 2016 Content Version: 11.2 © 9128-1089 [employer]. Care instructions adapted under license by [employer] (which disclaims liability or warranty for this information). If you have questions about a medical condition or this instruction, always ask your healthcare professional. [address] any warranty or liability for your use of this information. Introducing [facility]! [facility] introduces [employer] patient portal. Now you can access parts of your medical record, email your doctor's office, and request medication refills online. 1. In your internet browser, go to [email or web address] 2. Click on the First Time User? Click Here link in the Sign In box. You will see the New Member Sign Up page. 3. Enter your [employer] Access Code exactly as it appears below. You will not need to use this code after youve completed the sign-up process. If you do not sign up before the expiration date, you must request a new code. · [employer] Access Code: [email or web address] Expires: 8/17/2017  9:15 AM 
 
4. Enter the last four digits of your Social Security Number (xxxx) and Date of Birth (mm/dd/yyyy) as indicated and click Submit. You will be taken to the next sign-up page. 5. Create a [employer] ID. This will be your [employer] login ID and cannot be changed, so think of one that is secure and easy to remember. 6. Create a [employer] password. You can change your password at any time. 7. Enter your Password Reset Question and Answer. This can be used at a later time if you forget your password. 8. Enter your e-mail address. You will receive e-mail notification when new information is available in [address] 9. Click Sign Up. You can now view and download portions of your medical record. 10. Click the Download Summary menu link to download a portable copy of your medical information.  
 
If you have questions, please visit the Frequently Asked Questions section of the [employer]. Remember, [employer] is NOT to be used for urgent needs. For medical emergencies, dial 911. Now available from your iPhone and Android! Please provide this summary of care documentation to your next provider. Your primary care clinician is listed as Uri Tate. If you have any questions after today's visit, please call [phone].

## 2017-05-20 LAB
ALBUMIN SERPL-MCNC: 4.6 G/DL (ref 3.6–4.8)
ALBUMIN/CREAT UR: 18.3 MG/G CREAT (ref 0–30)
ALBUMIN/GLOB SERPL: 1.9 {RATIO} (ref 1.2–2.2)
ALP SERPL-CCNC: 88 IU/L (ref 39–117)
ALT SERPL-CCNC: 18 IU/L (ref 0–32)
AST SERPL-CCNC: 15 IU/L (ref 0–40)
BILIRUB SERPL-MCNC: 0.5 MG/DL (ref 0–1.2)
BUN SERPL-MCNC: 20 MG/DL (ref 8–27)
BUN/CREAT SERPL: 23 (ref 12–28)
CALCIUM SERPL-MCNC: 10.5 MG/DL (ref 8.7–10.3)
CHLORIDE SERPL-SCNC: 97 MMOL/L (ref 96–106)
CHOLEST SERPL-MCNC: 226 MG/DL (ref 100–199)
CO2 SERPL-SCNC: 29 MMOL/L (ref 18–29)
CREAT SERPL-MCNC: 0.87 MG/DL (ref 0.57–1)
CREAT UR-MCNC: 90.8 MG/DL
GLOBULIN SER CALC-MCNC: 2.4 G/DL (ref 1.5–4.5)
GLUCOSE SERPL-MCNC: 98 MG/DL (ref 65–99)
HDLC SERPL-MCNC: 104 MG/DL
INTERPRETATION, 910389: NORMAL
LDLC SERPL CALC-MCNC: 108 MG/DL (ref 0–99)
MICROALBUMIN UR-MCNC: 16.6 UG/ML
POTASSIUM SERPL-SCNC: 3.9 MMOL/L (ref 3.5–5.2)
PROT SERPL-MCNC: 7 G/DL (ref 6–8.5)
SODIUM SERPL-SCNC: 142 MMOL/L (ref 134–144)
TRIGL SERPL-MCNC: 71 MG/DL (ref 0–149)
VLDLC SERPL CALC-MCNC: 14 MG/DL (ref 5–40)

## 2017-06-14 ENCOUNTER — HOSPITAL ENCOUNTER (OUTPATIENT)
Dept: MAMMOGRAPHY | Age: 62
Discharge: HOME | End: 2017-06-14
Attending: FAMILY MEDICINE
Payer: COMMERCIAL

## 2017-06-14 DIAGNOSIS — Z12.31 VISIT FOR SCREENING MAMMOGRAM: ICD-10-CM

## 2017-06-14 PROCEDURE — 77063 BREAST TOMOSYNTHESIS BI: CPT

## 2017-07-10 RX ORDER — VENLAFAXINE HYDROCHLORIDE 37.5 MG/1
CAPSULE, EXTENDED RELEASE ORAL
Qty: 30 CAP | Refills: 0 | Status: SHIPPED | OUTPATIENT
Start: 2017-07-10 | End: 2017-10-07 | Stop reason: SDUPTHER

## 2017-08-14 RX ORDER — POTASSIUM CHLORIDE 750 MG/1
TABLET, EXTENDED RELEASE ORAL
Qty: 180 TAB | Refills: 0 | Status: SHIPPED | OUTPATIENT
Start: 2017-08-14 | End: 2017-10-12 | Stop reason: SDUPTHER

## 2017-08-26 RX ORDER — DICLOFENAC SODIUM 75 MG/1
TABLET, DELAYED RELEASE ORAL
Qty: 30 TAB | Refills: 0 | Status: SHIPPED | OUTPATIENT
Start: 2017-08-26 | End: 2017-09-08 | Stop reason: SDUPTHER

## 2017-09-08 RX ORDER — DICLOFENAC SODIUM 75 MG/1
TABLET, DELAYED RELEASE ORAL
Qty: 60 TAB | Refills: 0 | Status: SHIPPED | OUTPATIENT
Start: 2017-09-08 | End: 2017-09-21

## 2017-09-08 NOTE — TELEPHONE ENCOUNTER
Patient called to say this medication is being ordered wrong. It is to be ordered #60 for one month as she takes two a day. It is being ordered wrong of #30 for one month. Please advise and call patient.   [phone]

## 2017-09-19 ENCOUNTER — OFFICE VISIT (OUTPATIENT)
Dept: FAMILY MEDICINE CLINIC | Age: 62
End: 2017-09-19

## 2017-09-19 VITALS
HEIGHT: 64 IN | BODY MASS INDEX: 28.07 KG/M2 | TEMPERATURE: 100.3 F | WEIGHT: 164.4 LBS | RESPIRATION RATE: 18 BRPM | HEART RATE: 101 BPM | SYSTOLIC BLOOD PRESSURE: 111 MMHG | OXYGEN SATURATION: 95 % | DIASTOLIC BLOOD PRESSURE: 72 MMHG

## 2017-09-19 DIAGNOSIS — R05.9 COUGH: ICD-10-CM

## 2017-09-19 DIAGNOSIS — J02.9 SORETHROAT: Primary | ICD-10-CM

## 2017-09-19 LAB
QUICKVUE INFLUENZA TEST: NEGATIVE
S PYO AG THROAT QL: NEGATIVE
VALID INTERNAL CONTROL?: YES

## 2017-09-19 NOTE — PATIENT INSTRUCTIONS
Viral Respiratory Infection: Care Instructions  Your Care Instructions  Viruses are very small organisms. They grow in number after they enter your body. There are many types that cause different illnesses, such as colds and the mumps. The symptoms of a viral respiratory infection often start quickly. They include a fever, sore throat, and runny nose. You may also just not feel well. Or you may not want to eat much. Most viral respiratory infections are not serious. They usually get better with time and self-care. Antibiotics are not used to treat a viral infection. That's because antibiotics will not help cure a viral illness. In some cases, antiviral medicine can help your body fight a serious viral infection. Follow-up care is a key part of your treatment and safety. Be sure to make and go to all appointments, and call your doctor if you are having problems. It's also a good idea to know your test results and keep a list of the medicines you take. How can you care for yourself at home? · Rest as much as possible until you feel better. · Be safe with medicines. Take your medicine exactly as prescribed. Call your doctor if you think you are having a problem with your medicine. You will get more details on the specific medicine your doctor prescribes. · Take an over-the-counter pain medicine, such as acetaminophen (Tylenol), ibuprofen (Advil, Motrin), or naproxen (Aleve), as needed for pain and fever. Read and follow all instructions on the label. Do not give aspirin to anyone younger than 20. It has been linked to Reye syndrome, a serious illness. · Drink plenty of fluids, enough so that your urine is light yellow or clear like water. Hot fluids, such as tea or soup, may help relieve congestion in your nose and throat. If you have kidney, heart, or liver disease and have to limit fluids, talk with your doctor before you increase the amount of fluids you drink.   · Try to clear mucus from your lungs by breathing deeply and coughing. · Gargle with warm salt water once an hour. This can help reduce swelling and throat pain. Use 1 teaspoon of salt mixed in 1 cup of warm water. · Do not smoke or allow others to smoke around you. If you need help quitting, talk to your doctor about stop-smoking programs and medicines. These can increase your chances of quitting for good. To avoid spreading the virus  · Cough or sneeze into a tissue. Then throw the tissue away. · If you don't have a tissue, use your hand to cover your cough or sneeze. Then clean your hand. You can also cough into your sleeve. · Wash your hands often. Use soap and warm water. Wash for 15 to 20 seconds each time. · If you don't have soap and water near you, you can clean your hands with alcohol wipes or gel. When should you call for help? Call your doctor now or seek immediate medical care if:  · You have a new or higher fever. · Your fever lasts more than 48 hours. · You have trouble breathing. · You have a fever with a stiff neck or a severe headache. · You are sensitive to light. · You feel very sleepy or confused. Watch closely for changes in your health, and be sure to contact your doctor if:  · You do not get better as expected. Where can you learn more? Go to http://wilber-cj.info/. Enter C054 in the search box to learn more about \"Viral Respiratory Infection: Care Instructions. \"  Current as of: March 25, 2017  Content Version: 11.3  © 1014-3666 [employer]. Care instructions adapted under license by [employer] (which disclaims liability or warranty for this information). If you have questions about a medical condition or this instruction, always ask your healthcare professional. [address] any warranty or liability for your use of this information.

## 2017-09-19 NOTE — MR AVS SNAPSHOT
Visit Information Date & Time Provider Department Dept. Phone Encounter #  
 9/19/2017  3:25 PM Audelia Amos MD [facility] [phone] [id number] Upcoming Health Maintenance Date Due Hepatitis C Screening 1955 INFLUENZA AGE 9 TO ADULT 8/1/2017 COLONOSCOPY 1/1/2018 BREAST CANCER SCRN MAMMOGRAM 6/14/2019 PAP AKA CERVICAL CYTOLOGY 5/19/2022 DTaP/Tdap/Td series (2 - Td) 10/6/2025 Allergies as of 9/19/2017  Review Complete On: 5/53/8088 By: Morenita Bosch RN Severity Noted Reaction Type Reactions Erythromycin  09/13/2016    Nausea Only Current Immunizations  Never Reviewed Name Date Influenza Vaccine 10/6/2015, 10/27/2014 Tdap 10/6/2015 Zoster Vaccine, Live 1/1/2012 Not reviewed this visit You Were Diagnosed With   
  
 Codes Comments Sorethroat    -  Primary ICD-10-CM: J02.9 ICD-9-CM: 005 Vitals BP Pulse Temp Resp Height(growth percentile) Weight(growth percentile) 111/72 (BP 1 Location: Left arm, BP Patient Position: Sitting) (!) 101 100.3 °F (37.9 °C) (Oral) 18 5' 4\" (1.626 m) 164 lb 6.4 oz (74.6 kg) SpO2 BMI OB Status Smoking Status 95% 28.22 kg/m2 Postmenopausal Never Smoker Vitals History BMI and BSA Data Body Mass Index Body Surface Area  
 28.22 kg/m 2 1.84 m 2 Preferred Pharmacy Pharmacy Name Phone [facility] [address]  [facility] [phone] Your Updated Medication List  
  
   
This list is accurate as of: 9/19/17  3:52 PM.  Always use your most recent med list. amLODIPine 5 mg tablet Commonly known as:  [place] Newcomer Take 1 Tab by mouth daily. diclofenac EC 75 mg EC tablet Commonly known as:  VOLTAREN  
TAKE 1 TABLET BY MOUTH TWICE DAILY AS NEEDED  
  
 hydroCHLOROthiazide 25 mg tablet Commonly known as:  HYDRODIURIL  
 Take 1 Tab by mouth daily. losartan 25 mg tablet Commonly known as:  COZAAR Take 1 Tab by mouth daily. omeprazole 20 mg capsule Commonly known as:  PRILOSEC Take 1 Cap by mouth daily. (attempting to wean off) potassium chloride 10 mEq tablet Commonly known as:  KLOR-CON  
TAKE 1 CAPSULE BY MOUTH TWICE DAILY * venlafaxine-SR 37.5 mg capsule Commonly known as:  EFFEXOR-XR TK 1 C PO D  
  
 * venlafaxine-SR 37.5 mg capsule Commonly known as:  EFFEXOR-XR  
TAKE 1 CAPSULE BY MOUTH EVERY DAY  
  
 * Notice: This list has 2 medication(s) that are the same as other medications prescribed for you. Read the directions carefully, and ask your doctor or other care provider to review them with you. We Performed the Following AMB POC RAPID INFLUENZA TEST [76586 CPT(R)] AMB POC RAPID STREP TEST [70354 CPT(R)] Patient Instructions Viral Respiratory Infection: Care Instructions Your Care Instructions Viruses are very small organisms. They grow in number after they enter your body. There are many types that cause different illnesses, such as colds and the mumps. The symptoms of a viral respiratory infection often start quickly. They include a fever, sore throat, and runny nose. You may also just not feel well. Or you may not want to eat much. Most viral respiratory infections are not serious. They usually get better with time and self-care. Antibiotics are not used to treat a viral infection. That's because antibiotics will not help cure a viral illness. In some cases, antiviral medicine can help your body fight a serious viral infection. Follow-up care is a key part of your treatment and safety. Be sure to make and go to all appointments, and call your doctor if you are having problems. It's also a good idea to know your test results and keep a list of the medicines you take. How can you care for yourself at home? · Rest as much as possible until you feel better. · Be safe with medicines. Take your medicine exactly as prescribed. Call your doctor if you think you are having a problem with your medicine. You will get more details on the specific medicine your doctor prescribes. · Take an over-the-counter pain medicine, such as acetaminophen (Tylenol), ibuprofen (Advil, Motrin), or naproxen (Aleve), as needed for pain and fever. Read and follow all instructions on the label. Do not give aspirin to anyone younger than 20. It has been linked to Reye syndrome, a serious illness. · Drink plenty of fluids, enough so that your urine is light yellow or clear like water. Hot fluids, such as tea or soup, may help relieve congestion in your nose and throat. If you have kidney, heart, or liver disease and have to limit fluids, talk with your doctor before you increase the amount of fluids you drink. · Try to clear mucus from your lungs by breathing deeply and coughing. · Gargle with warm salt water once an hour. This can help reduce swelling and throat pain. Use 1 teaspoon of salt mixed in 1 cup of warm water. · Do not smoke or allow others to smoke around you. If you need help quitting, talk to your doctor about stop-smoking programs and medicines. These can increase your chances of quitting for good. To avoid spreading the virus · Cough or sneeze into a tissue. Then throw the tissue away. · If you don't have a tissue, use your hand to cover your cough or sneeze. Then clean your hand. You can also cough into your sleeve. · Wash your hands often. Use soap and warm water. Wash for 15 to 20 seconds each time. · If you don't have soap and water near you, you can clean your hands with alcohol wipes or gel. When should you call for help? Call your doctor now or seek immediate medical care if: 
· You have a new or higher fever. · Your fever lasts more than 48 hours. · You have trouble breathing. · You have a fever with a stiff neck or a severe headache. · You are sensitive to light. · You feel very sleepy or confused. Watch closely for changes in your health, and be sure to contact your doctor if: 
· You do not get better as expected. Where can you learn more? Go to http://wilber-cj.info/. Enter P427 in the search box to learn more about \"Viral Respiratory Infection: Care Instructions. \" Current as of: March 25, 2017 Content Version: 11.3 © 5131-4170 [employer]. Care instructions adapted under license by [employer] (which disclaims liability or warranty for this information). If you have questions about a medical condition or this instruction, always ask your healthcare professional. [address] any warranty or liability for your use of this information. Introducing [facility]! Joshua Merino introduces "[employer]" patient portal. Now you can access parts of your medical record, email your doctor's office, and request medication refills online. 1. In your internet browser, go to [email or web address] 2. Click on the First Time User? Click Here link in the Sign In box. You will see the New Member Sign Up page. 3. Enter your "[employer]" Access Code exactly as it appears below. You will not need to use this code after youve completed the sign-up process. If you do not sign up before the expiration date, you must request a new code. · "[employer]" Access Code: [email or web address] Expires: 12/18/2017  3:52 PM 
 
4. Enter the last four digits of your Social Security Number (xxxx) and Date of Birth (mm/dd/yyyy) as indicated and click Submit. You will be taken to the next sign-up page. 5. Create a "[employer]" ID. This will be your "[employer]" login ID and cannot be changed, so think of one that is secure and easy to remember. 6. Create a [email or web address] password. You can change your password at any time. 7. Enter your Password Reset Question and Answer. This can be used at a later time if you forget your password. 8. Enter your e-mail address. You will receive e-mail notification when new information is available in [address] 9. Click Sign Up. You can now view and download portions of your medical record. 10. Click the Download Summary menu link to download a portable copy of your medical information. If you have questions, please visit the Frequently Asked Questions section of the [employer] website. Remember, [employer] is NOT to be used for urgent needs. For medical emergencies, dial 911. Now available from your iPhone and Android! Please provide this summary of care documentation to your next provider. Your primary care clinician is listed as Bernie Dumont. If you have any questions after today's visit, please call [phone].

## 2017-09-19 NOTE — PROGRESS NOTES
Subjective:      Wang Vicente is a 60 y.o. female who presents for flu like symtoms   Complains of cough, sore throat and general malaise. Has been going on for the past two days. Treatment tried - Corcidin OTC treament  Alleviating factors - Has not noticed  Aggravating factors - Movement. Sick contacts -  has had cough for past week. Feels similar to when she had the flu in January. ROS:  General/Constitutional:   No headache, fever,  Neck:   No swelling, masses     Cardiac:    No chest pain      Respiratory:   No cough or shortness of breath     GI:   No nausea/vomiting      PMHx:  Past Medical History:   Diagnosis Date    Hypertension        Meds:   Current Outpatient Prescriptions   Medication Sig Dispense Refill    diclofenac EC (VOLTAREN) 75 mg EC tablet TAKE 1 TABLET BY MOUTH TWICE DAILY AS NEEDED 60 Tab 0    potassium chloride (KLOR-CON) 10 mEq tablet TAKE 1 CAPSULE BY MOUTH TWICE DAILY 180 Tab 0    venlafaxine-SR (EFFEXOR-XR) 37.5 mg capsule TAKE 1 CAPSULE BY MOUTH EVERY DAY 30 Cap 0    hydrochlorothiazide (HYDRODIURIL) 25 mg tablet Take 1 Tab by mouth daily. 90 Tab 3    losartan (COZAAR) 25 mg tablet Take 1 Tab by mouth daily. 90 Tab 3    amLODIPine (NORVASC) 5 mg tablet Take 1 Tab by mouth daily. 90 Tab 3    venlafaxine-SR (EFFEXOR-XR) 37.5 mg capsule TK 1 C PO D  1    omeprazole (PRILOSEC) 20 mg capsule Take 1 Cap by mouth daily. (attempting to wean off) (Patient not taking: Reported on 9/19/2017) 30 Cap 6       Allergies:    Allergies   Allergen Reactions    Erythromycin Nausea Only       Smoker:  History   Smoking Status    Never Smoker   Smokeless Tobacco    Never Used       ETOH:   History   Alcohol Use    0.6 oz/week    1 Glasses of wine per week     Comment: rarely       FH:   Family History   Problem Relation Age of Onset    Breast Cancer Mother 59    Hypertension Father     Cancer Sister     Colon Cancer Sister     Psychiatric Disorder Brother schizophrenia    Cancer Other     Colon Cancer Other          Objective:     Visit Vitals    /72 (BP 1 Location: Left arm, BP Patient Position: Sitting)    Pulse (!) 101    Temp 100.3 °F (37.9 °C) (Oral)    Resp 18    Ht 5' 4\" (1.626 m)    Wt 164 lb 6.4 oz (74.6 kg)    SpO2 95%    BMI 28.22 kg/m2       Physical Examination:   GEN: No apparent distress. Alert and oriented and responds to all questions appropriately. EYES:  Conjunctiva clear; pupils round and reactive to light; extraocular movements are intact. EAR: External ears are normal.  Tympanic membranes are clear and without effusion. NOSE: Turbinates are within normal limits. OROPHYARYNX: No oral lesions or exudates. NECK:  Supple; no masses; thyroid normal           LUNGS: Respirations unlabored; clear to auscultation bilaterally  CARDIOVASCULAR: Regular, rate, and rhythm without murmurs, gallops or rubs   . Assessment:       ICD-10-CM ICD-9-CM    1. Sorethroat J02.9 462 AMB POC RAPID STREP TEST      AMB POC RAPID INFLUENZA TEST   2. Cough R05 786.2            Plan:       POC strep and flu negative    Likely viral in nature  - Advised on symptomatic control with saline rinses and nasal sprays, handout given  - Can use tylenol/motrin as needed for generalized muscle pain and fever  - Can use Sudafed for nasal congestion, Robitussin for cough suppression, and Mucinex for cough expectorant   - Advised on the need to stay well hydrated and that symptoms can last up to 1.5 weeks  - Educated on the lack of benefit of antibiotics in a viral illness                             - Follow up if no improvement       Patient is counseled to return to the office if symptoms do not improve as expected. Urgent consultation with the nearest Emergency Department is strongly recommended if condition worsens. Patient is counseled to follow up as recommended and to inform the office if any changes in treatment are recommended.             Signed By: Leander Barnett MD    Family Medicine Resident

## 2017-09-19 NOTE — PROGRESS NOTES
Identified pt with two pt identifiers(name and ). Reviewed record in preparation for visit and have obtained necessary documentation. Chief Complaint   Patient presents with    Cold Symptoms     productive cough since last week with yellow sputum, malaise, sore throat; denies fever        Health Maintenance Due   Topic    Hepatitis C Screening     INFLUENZA AGE 9 TO ADULT     COLONOSCOPY        Coordination of Care Questionnaire:  :   1) Have you been to an emergency room, urgent care clinic since your last visit? no   Hospitalized since your last visit? no             2. Have seen or consulted any other health care provider since your last visit? YES  If yes, where when, and reason for visit? Went to orthopedic doctor ([facility] [place]) for back pain in July and is now doing PT. 3) Do you have an Advanced Directive/ Living Will in place? YES- pt was advised to bring copy into office to have scanned into EMR, pt agreed with plan  If yes, do we have a copy on file NO    Patient is accompanied by self I have received verbal consent from Anaid Auguste to discuss any/all medical information while they are present in the room.

## 2017-09-21 ENCOUNTER — HOSPITAL ENCOUNTER (INPATIENT)
Age: 62
LOS: 1 days | Discharge: HOME | DRG: 195 | End: 2017-09-23
Attending: EMERGENCY MEDICINE | Admitting: FAMILY MEDICINE
Payer: COMMERCIAL

## 2017-09-21 ENCOUNTER — OFFICE VISIT (OUTPATIENT)
Dept: FAMILY MEDICINE CLINIC | Age: 62
End: 2017-09-21

## 2017-09-21 ENCOUNTER — TELEPHONE (OUTPATIENT)
Dept: FAMILY MEDICINE CLINIC | Age: 62
End: 2017-09-21

## 2017-09-21 VITALS
SYSTOLIC BLOOD PRESSURE: 94 MMHG | WEIGHT: 164 LBS | HEIGHT: 64 IN | DIASTOLIC BLOOD PRESSURE: 61 MMHG | RESPIRATION RATE: 20 BRPM | BODY MASS INDEX: 28 KG/M2 | HEART RATE: 96 BPM | TEMPERATURE: 99.8 F | OXYGEN SATURATION: 89 %

## 2017-09-21 DIAGNOSIS — D72.829 LEUKOCYTOSIS, UNSPECIFIED TYPE: ICD-10-CM

## 2017-09-21 DIAGNOSIS — J06.9 URI, ACUTE: ICD-10-CM

## 2017-09-21 DIAGNOSIS — J18.9 COMMUNITY ACQUIRED PNEUMONIA: Primary | ICD-10-CM

## 2017-09-21 DIAGNOSIS — R09.02 HYPOXIA: ICD-10-CM

## 2017-09-21 DIAGNOSIS — E86.0 DEHYDRATION: ICD-10-CM

## 2017-09-21 DIAGNOSIS — E87.6 HYPOKALEMIA: ICD-10-CM

## 2017-09-21 DIAGNOSIS — J18.9 CAP (COMMUNITY ACQUIRED PNEUMONIA): Primary | ICD-10-CM

## 2017-09-21 LAB
ANION GAP SERPL CALC-SCNC: 10 MMOL/L (ref 5–15)
BASOPHILS # BLD: 0 K/UL (ref 0–0.1)
BASOPHILS NFR BLD: 0 % (ref 0–1)
BUN SERPL-MCNC: 18 MG/DL (ref 6–20)
BUN/CREAT SERPL: 14 (ref 12–20)
CALCIUM SERPL-MCNC: 8.9 MG/DL (ref 8.5–10.1)
CHLORIDE SERPL-SCNC: 96 MMOL/L (ref 97–108)
CO2 SERPL-SCNC: 33 MMOL/L (ref 21–32)
CREAT SERPL-MCNC: 1.29 MG/DL (ref 0.55–1.02)
EOSINOPHIL # BLD: 0 K/UL (ref 0–0.4)
EOSINOPHIL NFR BLD: 0 % (ref 0–7)
ERYTHROCYTE [DISTWIDTH] IN BLOOD BY AUTOMATED COUNT: 13 % (ref 11.5–14.5)
GLUCOSE SERPL-MCNC: 107 MG/DL (ref 65–100)
HCT VFR BLD AUTO: 41.4 % (ref 35–47)
HGB BLD-MCNC: 14.1 G/DL (ref 11.5–16)
LACTATE SERPL-SCNC: 1.6 MMOL/L (ref 0.4–2)
LYMPHOCYTES # BLD: 1.8 K/UL (ref 0.8–3.5)
LYMPHOCYTES NFR BLD: 12 % (ref 12–49)
MCH RBC QN AUTO: 31.7 PG (ref 26–34)
MCHC RBC AUTO-ENTMCNC: 34.1 G/DL (ref 30–36.5)
MCV RBC AUTO: 93 FL (ref 80–99)
MONOCYTES # BLD: 1.3 K/UL (ref 0–1)
MONOCYTES NFR BLD: 9 % (ref 5–13)
NEUTS SEG # BLD: 11.2 K/UL (ref 1.8–8)
NEUTS SEG NFR BLD: 79 % (ref 32–75)
PLATELET # BLD AUTO: 293 K/UL (ref 150–400)
POTASSIUM SERPL-SCNC: 3 MMOL/L (ref 3.5–5.1)
RBC # BLD AUTO: 4.45 M/UL (ref 3.8–5.2)
SODIUM SERPL-SCNC: 139 MMOL/L (ref 136–145)
WBC # BLD AUTO: 14.2 K/UL (ref 3.6–11)

## 2017-09-21 PROCEDURE — 96361 HYDRATE IV INFUSION ADD-ON: CPT

## 2017-09-21 PROCEDURE — 36415 COLL VENOUS BLD VENIPUNCTURE: CPT | Performed by: EMERGENCY MEDICINE

## 2017-09-21 PROCEDURE — 96366 THER/PROPH/DIAG IV INF ADDON: CPT

## 2017-09-21 PROCEDURE — 74011250636 HC RX REV CODE- 250/636: Performed by: EMERGENCY MEDICINE

## 2017-09-21 PROCEDURE — 74011250637 HC RX REV CODE- 250/637: Performed by: EMERGENCY MEDICINE

## 2017-09-21 PROCEDURE — 83605 ASSAY OF LACTIC ACID: CPT | Performed by: EMERGENCY MEDICINE

## 2017-09-21 PROCEDURE — 96360 HYDRATION IV INFUSION INIT: CPT

## 2017-09-21 PROCEDURE — 93005 ELECTROCARDIOGRAM TRACING: CPT

## 2017-09-21 PROCEDURE — 96365 THER/PROPH/DIAG IV INF INIT: CPT

## 2017-09-21 PROCEDURE — 77030013140 HC MSK NEB VYRM -A

## 2017-09-21 PROCEDURE — 74011000250 HC RX REV CODE- 250: Performed by: EMERGENCY MEDICINE

## 2017-09-21 PROCEDURE — 87040 BLOOD CULTURE FOR BACTERIA: CPT | Performed by: EMERGENCY MEDICINE

## 2017-09-21 PROCEDURE — 99218 HC RM OBSERVATION: CPT

## 2017-09-21 PROCEDURE — 74011250637 HC RX REV CODE- 250/637: Performed by: FAMILY MEDICINE

## 2017-09-21 PROCEDURE — 85025 COMPLETE CBC W/AUTO DIFF WBC: CPT | Performed by: EMERGENCY MEDICINE

## 2017-09-21 PROCEDURE — 80048 BASIC METABOLIC PNL TOTAL CA: CPT | Performed by: EMERGENCY MEDICINE

## 2017-09-21 PROCEDURE — 99285 EMERGENCY DEPT VISIT HI MDM: CPT

## 2017-09-21 PROCEDURE — 74011250636 HC RX REV CODE- 250/636: Performed by: FAMILY MEDICINE

## 2017-09-21 RX ORDER — LEVOFLOXACIN 750 MG/1
750 TABLET ORAL EVERY 24 HOURS
Status: DISCONTINUED | OUTPATIENT
Start: 2017-09-22 | End: 2017-09-22

## 2017-09-21 RX ORDER — SODIUM CHLORIDE 0.9 % (FLUSH) 0.9 %
5-10 SYRINGE (ML) INJECTION EVERY 8 HOURS
Status: DISCONTINUED | OUTPATIENT
Start: 2017-09-21 | End: 2017-09-23 | Stop reason: HOSPADM

## 2017-09-21 RX ORDER — POTASSIUM CHLORIDE 750 MG/1
40 TABLET, FILM COATED, EXTENDED RELEASE ORAL ONCE
Status: COMPLETED | OUTPATIENT
Start: 2017-09-21 | End: 2017-09-21

## 2017-09-21 RX ORDER — DICLOFENAC SODIUM 75 MG/1
75 TABLET, DELAYED RELEASE ORAL 2 TIMES DAILY
COMMUNITY
End: 2017-10-12 | Stop reason: SDUPTHER

## 2017-09-21 RX ORDER — IPRATROPIUM BROMIDE AND ALBUTEROL SULFATE 2.5; .5 MG/3ML; MG/3ML
3 SOLUTION RESPIRATORY (INHALATION)
Qty: 3 ML | Refills: 0
Start: 2017-09-21 | End: 2017-09-23

## 2017-09-21 RX ORDER — LEVOFLOXACIN 5 MG/ML
750 INJECTION, SOLUTION INTRAVENOUS
Status: COMPLETED | OUTPATIENT
Start: 2017-09-21 | End: 2017-09-21

## 2017-09-21 RX ORDER — SODIUM CHLORIDE 0.9 % (FLUSH) 0.9 %
5-10 SYRINGE (ML) INJECTION AS NEEDED
Status: DISCONTINUED | OUTPATIENT
Start: 2017-09-21 | End: 2017-09-23 | Stop reason: HOSPADM

## 2017-09-21 RX ORDER — CEFTRIAXONE 1 G/1
1 INJECTION, POWDER, FOR SOLUTION INTRAMUSCULAR; INTRAVENOUS ONCE
Qty: 1 VIAL | Refills: 0
Start: 2017-09-21 | End: 2017-09-23

## 2017-09-21 RX ORDER — ACETAMINOPHEN 325 MG/1
650 TABLET ORAL
Status: DISCONTINUED | OUTPATIENT
Start: 2017-09-21 | End: 2017-09-22

## 2017-09-21 RX ORDER — ACETAMINOPHEN 325 MG/1
975 TABLET ORAL
Status: COMPLETED | OUTPATIENT
Start: 2017-09-21 | End: 2017-09-21

## 2017-09-21 RX ORDER — SODIUM CHLORIDE 9 MG/ML
110 INJECTION, SOLUTION INTRAVENOUS CONTINUOUS
Status: DISCONTINUED | OUTPATIENT
Start: 2017-09-21 | End: 2017-09-23 | Stop reason: HOSPADM

## 2017-09-21 RX ORDER — VENLAFAXINE HYDROCHLORIDE 37.5 MG/1
37.5 CAPSULE, EXTENDED RELEASE ORAL
Status: DISCONTINUED | OUTPATIENT
Start: 2017-09-22 | End: 2017-09-23 | Stop reason: HOSPADM

## 2017-09-21 RX ORDER — DOXYCYCLINE 100 MG/1
100 CAPSULE ORAL 2 TIMES DAILY
Qty: 20 CAP | Refills: 0 | Status: ON HOLD | OUTPATIENT
Start: 2017-09-21 | End: 2017-09-23

## 2017-09-21 RX ORDER — HEPARIN SODIUM 5000 [USP'U]/ML
5000 INJECTION, SOLUTION INTRAVENOUS; SUBCUTANEOUS EVERY 8 HOURS
Status: DISCONTINUED | OUTPATIENT
Start: 2017-09-21 | End: 2017-09-23 | Stop reason: HOSPADM

## 2017-09-21 RX ORDER — BISMUTH SUBSALICYLATE 262 MG
1 TABLET,CHEWABLE ORAL DAILY
COMMUNITY

## 2017-09-21 RX ORDER — POTASSIUM CHLORIDE 750 MG/1
40 TABLET, FILM COATED, EXTENDED RELEASE ORAL
Status: COMPLETED | OUTPATIENT
Start: 2017-09-21 | End: 2017-09-21

## 2017-09-21 RX ADMIN — SODIUM CHLORIDE 1000 ML: 900 INJECTION, SOLUTION INTRAVENOUS at 17:00

## 2017-09-21 RX ADMIN — SODIUM CHLORIDE 110 ML/HR: 900 INJECTION, SOLUTION INTRAVENOUS at 19:10

## 2017-09-21 RX ADMIN — POTASSIUM CHLORIDE 40 MEQ: 750 TABLET, FILM COATED, EXTENDED RELEASE ORAL at 18:52

## 2017-09-21 RX ADMIN — POTASSIUM CHLORIDE 40 MEQ: 750 TABLET, FILM COATED, EXTENDED RELEASE ORAL at 21:00

## 2017-09-21 RX ADMIN — LEVOFLOXACIN 750 MG: 5 INJECTION, SOLUTION INTRAVENOUS at 17:10

## 2017-09-21 RX ADMIN — ACETAMINOPHEN 975 MG: 325 TABLET ORAL at 17:33

## 2017-09-21 RX ADMIN — ALBUTEROL SULFATE 1 DOSE: 2.5 SOLUTION RESPIRATORY (INHALATION) at 16:49

## 2017-09-21 RX ADMIN — SODIUM CHLORIDE 1000 ML: 900 INJECTION, SOLUTION INTRAVENOUS at 18:50

## 2017-09-21 NOTE — IP AVS SNAPSHOT
Romulo Adams 
 
 
 [address] [facility] [place] [address] 
[phone] Patient: Khoi Schaefer MRN: [id number] OVL:[id number] You are allergic to the following Allergen Reactions Erythromycin Nausea Only Recent Documentation Height Weight BMI OB Status Smoking Status 1.626 m 74.4 kg 28.15 kg/m2 Postmenopausal Never Smoker Unresulted Labs Order Current Status CULTURE, BLOOD, PAIRED Preliminary result Emergency Contacts Name Discharge Info Relation Home Work Mobile [address] CAREGIVER [3] Spouse [3] [phone] About your hospitalization You were admitted on:  September 21, 2017 You last received care in the:  [facility] 4M POST SURG ORT 2 You were discharged on:  September 23, 2017 Unit phone number:  [phone] Why you were hospitalized Your primary diagnosis was:  Not on File Your diagnoses also included:  Pneumonia, Cap (Community Acquired Pneumonia) Providers Seen During Your Hospitalizations Provider Role Specialty Primary office phone Bryanna Morales MD Attending Provider Emergency Medicine [phone] Kamille Solorio MD Attending Provider [facility] [phone] Your Primary Care Physician (PCP) Primary Care Physician Office Phone Office Fax [address] [facility],  [address] [phone] Follow-up Information Follow up With Details Comments Contact Info Sd Tadeo MD Go on 9/27/2017 Follow up, appointment is at: 10:45pm  [address] [address] [place] [address] 
[phone] Cheikh Orosco[address] 
[phone] Your Appointments Wednesday September 27, 2017 10:45 AM EDT TRANSITIONAL CARE MANAGEMENT with Sd Tadeo MD  
[facility])  
 [address] [place] [address]  
[phone] Current Discharge Medication List  
  
CONTINUE these medications which have CHANGED Dose & Instructions Dispensing Information Comments Morning Noon Evening Bedtime  
 hydroCHLOROthiazide 25 mg tablet Commonly known as:  HYDRODIURIL What changed:  how much to take Your last dose was: Your next dose is:    
   
   
 Dose:  12.5 mg Take 0.5 Tabs by mouth daily. Quantity:  30 Tab Refills:  0 CONTINUE these medications which have NOT CHANGED Dose & Instructions Dispensing Information Comments Morning Noon Evening Bedtime CORICIDIN HBP COUGH AND COLD 4-30 mg Tab Generic drug:  chlorpheniramine-dm Your last dose was: Your next dose is:    
   
   
 Dose:  1 Tab Take 1 Tab by mouth every six (6) hours as needed (cough and cold symptoms). Refills:  0  
     
   
   
   
  
 diclofenac EC 75 mg EC tablet Commonly known as:  VOLTAREN Your last dose was: Your next dose is:    
   
   
 Dose:  75 mg Take 75 mg by mouth two (2) times a day. Refills:  0  
     
   
   
   
  
 doxycycline 100 mg capsule Commonly known as:  VIBRAMYCIN Your last dose was: Your next dose is:    
   
   
 Dose:  100 mg Take 1 Cap by mouth two (2) times a day for 9 days. Quantity:  17 Cap Refills:  0  
     
   
   
   
  
 multivitamin tablet Commonly known as:  ONE A DAY Your last dose was: Your next dose is:    
   
   
 Dose:  1 Tab Take 1 Tab by mouth daily. Refills:  0  
     
   
   
   
  
 potassium chloride 10 mEq tablet Commonly known as:  KLOR-CON Your last dose was: Your next dose is: TAKE 1 CAPSULE BY MOUTH TWICE DAILY Quantity:  180 Tab Refills:  0  
     
   
   
   
  
 venlafaxine-SR 37.5 mg capsule Commonly known as:  EFFEXOR-XR Your last dose was: Your next dose is:  TAKE 1 CAPSULE BY MOUTH EVERY DAY  
 Quantity:  30 Cap Refills:  0 STOP taking these medications   
 albuterol-ipratropium 2.5 mg-0.5 mg/3 ml Nebu Commonly known as:  DUO-NEB  
   
  
 cefTRIAXone 1 gram injection Commonly known as:  ROCEPHIN  
   
  
  
ASK your doctor about these medications Dose & Instructions Dispensing Information Comments Morning Noon Evening Bedtime  
 amLODIPine 5 mg tablet Commonly known as:  Monique Thao Your last dose was: Your next dose is:    
   
   
 Dose:  5 mg Take 1 Tab by mouth daily. Quantity:  90 Tab Refills:  3  
     
   
   
   
  
 losartan 25 mg tablet Commonly known as:  COZAAR Your last dose was: Your next dose is:    
   
   
 Dose:  25 mg Take 1 Tab by mouth daily. Quantity:  90 Tab Refills:  3 Where to Get Your Medications Information on where to get these meds will be given to you by the nurse or doctor. ! Ask your nurse or doctor about these medications  
  doxycycline 100 mg capsule  
 hydroCHLOROthiazide 25 mg tablet Discharge Instructions HOME DISCHARGE INSTRUCTIONS Jason Granda / [id number] : 1955 Admission date: 2017 Discharge date: 2017 Please bring this form with you to show your care provider at your follow-up appointment. Primary care provider:  Florencio Sorto DO Discharging provider:  Ailin Shepard MD  - Family Medicine Resident Sheri Panda MD - Attending, Family Medicine You have been admitted to the hospital with the following diagnoses: 
 
ACUTE DIAGNOSES: 
· Pneumonia · CAP (community acquired pneumonia) [facility] . . . . . . . . . . . . . . . . . . . . . . . . . . . . . . . . . . . . . . . . . . . . . . . . . . . . . . . . . . . . . . . . . . . . . . [facility]res   
 
FOLLOW-UP CARE RECOMMENDATIONS: 
 
Medication changes:  
-For Pneumonia please take doxycycline 100mg two times a day for 9 more days. Next dosing this evening, at 9pm. 
 
-Also, for your blood pressure, It was good while inpatient!! NO BLOOD PRESSURE MEDICATION WAS NEEDED TO BE GIVEN. Therefore, will decrease only restart hydrochlorothiazide(HCTZ). Script given to you prior to discharge for HCTZ 12.5 mg, which is a decrease from 25 mg daily.  
 
-Please keep a log of Blood pressure. Once you see PCP and discuss which medications to restart. Therefore do not take Amlodipine and Losartan when you get home unless your blood pressure is greater that 140/90, you can add Norvasc. Appointments: Listed on page 2 of these documents. Follow-up tests needed: See PCP, for hospital follow-up of your pneumonia and to talk about appropriate blood pressure medications. Pending test results: At the time of your discharge the following test results are still pending: None. Please make sure you review these results with your outpatient follow-up provider(s). Specific symptoms to watch for: chest pain, shortness of breath, fever, chills, nausea, vomiting, diarrhea, change in mentation, falling, weakness, bleeding. DIET/what to eat: Regular Diet ACTIVITY:  Activity as tolerated Wound care: None Equipment needed:  None What to do if new or unexpected symptoms occur? If you experience any of the above symptoms (or should other concerns or questions arise after discharge) please call your primary care physician. Return to the emergency room if you cannot get hold of your doctor. · It is very important that you keep your follow-up appointment(s). · Please bring discharge papers, medication list (and/or medication bottles) to your follow-up appointments for review by your outpatient provider(s). · Please check the list of medications and be sure it includes every medication (even non-prescription medications) that your provider wants you to take.    
· It is important that you take the medication exactly as they are prescribed. · Keep your medication in the bottles provided by the pharmacist and keep a list of the medication names, dosages, and times to be taken in your wallet. · Do not take other medications without consulting your doctor. · If you have any questions about your medications or other instructions, please talk to your nurse or care provider before you leave the hospital.  
 
Information obtained by:  
 
I understand that if any problems occur once I am at home I am to contact my physician. These instructions were explained to me and I had the opportunity to ask questions. I understand and acknowledge receipt of the instructions indicated above. Physician's or R.N.'s Signature                                                                  Date/Time Patient or Representative Signature                                                          Date/Time Discharge Orders None [employer] Announcement We are excited to announce that we are making your provider's discharge notes available to you in [employer]. You will see these notes when they are completed and signed by the physician that discharged you from your recent hospital stay. If you have any questions or concerns about any information you see in [employer], please call the Health Information Department where you were seen or reach out to your Primary Care Provider for more information about your plan of care. Introducing [facility]! Yahaira Newby introduces [employer] patient portal. Now you can access parts of your medical record, email your doctor's office, and request medication refills online.    
 
1. In your internet browser, go to [email or web address] 2. Click on the First Time User? Click Here link in the Sign In box. You will see the New Member Sign Up page. 3. Enter your [employer] Access Code exactly as it appears below. You will not need to use this code after youve completed the sign-up process. If you do not sign up before the expiration date, you must request a new code. · [employer] Access Code: [email or web address] Expires: 12/18/2017  3:52 PM 
 
4. Enter the last four digits of your Social Security Number (xxxx) and Date of Birth (mm/dd/yyyy) as indicated and click Submit. You will be taken to the next sign-up page. 5. Create a [employer] ID. This will be your [employer] login ID and cannot be changed, so think of one that is secure and easy to remember. 6. Create a [employer] password. You can change your password at any time. 7. Enter your Password Reset Question and Answer. This can be used at a later time if you forget your password. 8. Enter your e-mail address. You will receive e-mail notification when new information is available in [address] 9. Click Sign Up. You can now view and download portions of your medical record. 10. Click the Download Summary menu link to download a portable copy of your medical information. If you have questions, please visit the Frequently Asked Questions section of the [employer] website. Remember, [employer] is NOT to be used for urgent needs. For medical emergencies, dial 911. Now available from your iPhone and Android! General Information Please provide this summary of care documentation to your next provider. Patient Signature:  ____________________________________________________________ Date:  ____________________________________________________________  
  
Richey Livings Provider Signature:  ____________________________________________________________ Date:  ____________________________________________________________

## 2017-09-21 NOTE — ED TRIAGE NOTES
[facility]; symptoms present since Monday. Non productive cough, denies fever, chills.

## 2017-09-21 NOTE — IP AVS SNAPSHOT
[address] [facility] 
 
 
 [address] [phone] Patient: Anselmo Krabbe MRN: [id number] VXA:9[birth year]5/[birth year]5 Current Discharge Medication List  
  
CONTINUE these medications which have CHANGED Dose & Instructions Dispensing Information Comments Morning Noon Evening Bedtime  
 hydroCHLOROthiazide 25 mg tablet Commonly known as:  HYDRODIURIL What changed:  how much to take Your last dose was: Your next dose is:    
   
   
 Dose:  12.5 mg Take 0.5 Tabs by mouth daily. Quantity:  30 Tab Refills:  0 CONTINUE these medications which have NOT CHANGED Dose & Instructions Dispensing Information Comments Morning Noon Evening Bedtime CORICIDIN HBP COUGH AND COLD 4-30 mg Tab Generic drug:  chlorpheniramine-dm Your last dose was: Your next dose is:    
   
   
 Dose:  1 Tab Take 1 Tab by mouth every six (6) hours as needed (cough and cold symptoms). Refills:  0  
     
   
   
   
  
 diclofenac EC 75 mg EC tablet Commonly known as:  VOLTAREN Your last dose was: Your next dose is:    
   
   
 Dose:  75 mg Take 75 mg by mouth two (2) times a day. Refills:  0  
     
   
   
   
  
 doxycycline 100 mg capsule Commonly known as:  VIBRAMYCIN Your last dose was: Your next dose is:    
   
   
 Dose:  100 mg Take 1 Cap by mouth two (2) times a day for 9 days. Quantity:  17 Cap Refills:  0  
     
   
   
   
  
 multivitamin tablet Commonly known as:  ONE A DAY Your last dose was: Your next dose is:    
   
   
 Dose:  1 Tab Take 1 Tab by mouth daily. Refills:  0  
     
   
   
   
  
 potassium chloride 10 mEq tablet Commonly known as:  KLOR-CON Your last dose was: Your next dose is: TAKE 1 CAPSULE BY MOUTH TWICE DAILY Quantity:  180 Tab Refills:  0 venlafaxine-SR 37.5 mg capsule Commonly known as:  EFFEXOR-XR Your last dose was: Your next dose is: TAKE 1 CAPSULE BY MOUTH EVERY DAY Quantity:  30 Cap Refills:  0 STOP taking these medications   
 albuterol-ipratropium 2.5 mg-0.5 mg/3 ml Nebu Commonly known as:  DUO-NEB  
   
  
 cefTRIAXone 1 gram injection Commonly known as:  ROCEPHIN  
   
  
  
ASK your doctor about these medications Dose & Instructions Dispensing Information Comments Morning Noon Evening Bedtime  
 amLODIPine 5 mg tablet Commonly known as:  Alverto Wong Your last dose was: Your next dose is:    
   
   
 Dose:  5 mg Take 1 Tab by mouth daily. Quantity:  90 Tab Refills:  3  
     
   
   
   
  
 losartan 25 mg tablet Commonly known as:  COZAAR Your last dose was: Your next dose is:    
   
   
 Dose:  25 mg Take 1 Tab by mouth daily. Quantity:  90 Tab Refills:  3 Where to Get Your Medications Information on where to get these meds will be given to you by the nurse or doctor. ! Ask your nurse or doctor about these medications  
  doxycycline 100 mg capsule  
 hydroCHLOROthiazide 25 mg tablet

## 2017-09-21 NOTE — H&P
[address] [facility] [address] [place] [address]   Office [phone]  Fax [phone]       Admission H&P     Name: Leisa Lee MRN: [id number]  Sex: Female   YOB: 1955  Age: 60 y.o. PCP: Andrea Moreira DO     Source of Information: patient, medical records    Chief complaint: pnuemonia    History of Present Illness  Leisa Lee is a 60 y.o. female with known hx of HTN, YUDY, renal and hepatic cysts, and asthma related to allergies who presents to the ER complaining of cough, wheezing. Pt has had cough, wheezing, malaise x 4 days, seen by PCP on 9/19, thought to be viral in origin. Returned today as symptoms had not improved, ordered CXR which showed bilateral lower lobe pneumonia. Was going to go home but patient desatted to 88% while in office, so was sent to ED. Patient reports for the past 4 days feeling feverish, chills (but did not take temp), with fatigue and productive cough, wheezing, feeling SOB both at rest and with exertion. Denies N/V, abdominal pain, SP, HA, dizziness, diarrhea, urinary symptoms. Denies sick contacts but per chart review pt said earlier that  has had cough recently as well. Pt with hx of asthma but reports she has not used an inhaler in several years, her asthma is usually due to seasonal and pet allergies and can be controlled with OTC antihistamines. No hx of CAP, no recent hospitalization. Pt was s/p rocephin 1g IM and duoneb x 1 at PCP office. Pt reports no improvement in her symptoms after duonebs at PCP and in ER. In the ED:  - Vitals - T99.8 POA, increased to 100.8. HR 92, /57, RR 21, satting % RA.   - Labs - WBC 14.2, Hgb 14.1, Na 139, K 3.0, Cr 1.29 (BL 0.85). Lactic acid 1.6.   - Imaging - EKG 81, CXR (at PCP) showed bilateral infiltrates  - Treatment - collected blood Cx, gave levaquin 750mg IV x 1, K repletion w 40 mEq, NS bolus 1L x 2, tylenol 975, and duonebs x 1.      Past Medical History:   Diagnosis Date    Hypertension       Patient Vitals for the past 12 hrs:   Temp Pulse Resp BP SpO2   09/21/17 1757 99.8 °F (37.7 °C) - - - -   09/21/17 1745 - 92 21 123/57 100 %   09/21/17 1709 (!) 100.8 °F (38.2 °C) 90 19 124/57 100 %   09/21/17 1554 - - - - 96 %   09/21/17 1543 98.2 °F (36.8 °C) 94 19 116/58 92 %       Home Medications   Prior to Admission medications    Medication Sig Start Date End Date Taking? Authorizing Provider   doxycycline (VIBRAMYCIN) 100 mg capsule Take 1 Cap by mouth two (2) times a day for 10 days. 9/21/17 10/1/17 Yes Jean Dumont MD   albuterol-ipratropium (DUO-NEB) 2.5 mg-0.5 mg/3 ml nebu 3 mL by Nebulization route now for 1 dose. 9/21/17 9/21/17 Yes Jean Dumont MD   cefTRIAXone (ROCEPHIN) 1 gram injection 1 g by IntraMUSCular route once for 1 dose. 9/21/17 9/21/17 Yes Jean Dumont MD   diclofenac EC (VOLTAREN) 75 mg EC tablet Take 75 mg by mouth two (2) times a day. Yes Historical Provider   multivitamin (ONE A DAY) tablet Take 1 Tab by mouth daily. Yes Historical Provider   chlorpheniramine-dm (CORICIDIN HBP COUGH AND COLD) 4-30 mg tab Take 1 Tab by mouth every six (6) hours as needed (cough and cold symptoms). Yes Historical Provider   potassium chloride (KLOR-CON) 10 mEq tablet TAKE 1 CAPSULE BY MOUTH TWICE DAILY 8/14/17  Yes Uri Malhotra MD   venlafaxine-SR [facility] 37.5 mg capsule TAKE 1 CAPSULE BY MOUTH EVERY DAY 7/10/17  Yes Francis Wood DO   hydrochlorothiazide (HYDRODIURIL) 25 mg tablet Take 1 Tab by mouth daily. 9/13/16  Yes Dorothy Fried MD   losartan (COZAAR) 25 mg tablet Take 1 Tab by mouth daily. 9/13/16  Yes Dorothy Fried MD   amLODIPine (NORVASC) 5 mg tablet Take 1 Tab by mouth daily.  9/13/16  Yes Dorothy Fried MD       Allergies  Allergies   Allergen Reactions    Erythromycin Nausea Only       Past Medical History:   Diagnosis Date    Hypertension        Previous Hospitalization(s)    Past Surgical History:   Procedure Laterality Date    HX  SECTION         Family History   Problem Relation Age of Onset    Breast Cancer Mother 59    Hypertension Father     Cancer Sister     Colon Cancer Sister     Psychiatric Disorder Brother      schizophrenia    Cancer Other     Colon Cancer Other        Social History  Social History     Social History    Marital status:      Spouse name: N/A    Number of children: N/A    Years of education: N/A     Occupational History    Not on file. Social History Main Topics    Smoking status: Never Smoker    Smokeless tobacco: Never Used    Alcohol use 0.6 oz/week     1 Glasses of wine per week      Comment: rarely    Drug use: No    Sexual activity: No     Other Topics Concern    Not on file     Social History Narrative    Lives with     Daughter Everette Yañez  2014 from colon cancer soon after having his son    Works at home but still works in [employer] 2 days per week,        Alcohol history: Not at all  Smoking history: Non-smoker  Illicit drug history: Not at all    Living arrangement: patient lives with their spouse. Ambulates: Independently     Review of Systems  Review of Systems   Constitutional: Positive for activity change, appetite change, chills, fatigue and fever. Negative for diaphoresis. HENT: Positive for congestion. Respiratory: Positive for cough, shortness of breath and wheezing. Negative for choking and chest tightness. Cardiovascular: Negative for chest pain, palpitations and leg swelling. Gastrointestinal: Negative for abdominal distention, abdominal pain, constipation, diarrhea, nausea and vomiting. Genitourinary: Negative for difficulty urinating, dysuria and hematuria. Neurological: Negative for dizziness, weakness, light-headedness and headaches. Physical Exam  Objective:  General Appearance:  Comfortable, well-appearing and in no acute distress.     Vital signs: (most recent): Blood pressure 123/57, pulse 92, temperature 99.8 °F (37.7 °C), resp. rate 21, height 5' 4\" (1.626 m), weight 164 lb (74.4 kg), SpO2 100 %. Fever. Lungs:  Normal respiratory rate and normal effort. She is not in respiratory distress. There are rhonchi. (Intermittent expiratory wheezing but difficult to hear through diffuse rhonchi)  Heart: Normal rate. Regular rhythm. Chest: No chest wall tenderness. Symmetric chest wall expansion. Neurological: Patient is alert and oriented to person, place and time. Abdomen: Abdomen is soft and non-distended. Bowel sounds are normal.   There is no abdominal tenderness. O2 Device: Room air     Laboratory Data  Recent Results (from the past 8 hour(s))   EKG, 12 LEAD, INITIAL    Collection Time: 09/21/17  4:22 PM   Result Value Ref Range    Ventricular Rate 81 BPM    Atrial Rate 81 BPM    P-R Interval 138 ms    QRS Duration 92 ms    Q-T Interval 412 ms    QTC Calculation (Bezet) 478 ms    Calculated P Axis -4 degrees    Calculated R Axis 45 degrees    Calculated T Axis 36 degrees    Diagnosis       Normal sinus rhythm  Normal ECG  No previous ECGs available     CBC WITH AUTOMATED DIFF    Collection Time: 09/21/17  5:03 PM   Result Value Ref Range    WBC 14.2 (H) 3.6 - 11.0 K/uL    RBC 4.45 3.80 - 5.20 M/uL    HGB 14.1 11.5 - 16.0 g/dL    HCT 41.4 35.0 - 47.0 %    MCV 93.0 80.0 - 99.0 FL    MCH 31.7 26.0 - 34.0 PG    MCHC 34.1 30.0 - 36.5 g/dL    RDW 13.0 11.5 - 14.5 %    PLATELET 039 585 - 364 K/uL    NEUTROPHILS 79 (H) 32 - 75 %    LYMPHOCYTES 12 12 - 49 %    MONOCYTES 9 5 - 13 %    EOSINOPHILS 0 0 - 7 %    BASOPHILS 0 0 - 1 %    ABS. NEUTROPHILS 11.2 (H) 1.8 - 8.0 K/UL    ABS. LYMPHOCYTES 1.8 0.8 - 3.5 K/UL    ABS. MONOCYTES 1.3 (H) 0.0 - 1.0 K/UL    ABS. EOSINOPHILS 0.0 0.0 - 0.4 K/UL    ABS.  BASOPHILS 0.0 0.0 - 0.1 K/UL   METABOLIC PANEL, BASIC    Collection Time: 09/21/17  5:03 PM   Result Value Ref Range    Sodium 139 136 - 145 mmol/L    Potassium 3.0 (L) 3.5 - 5.1 mmol/L Chloride 96 (L) 97 - 108 mmol/L    CO2 33 (H) 21 - 32 mmol/L    Anion gap 10 5 - 15 mmol/L    Glucose 107 (H) 65 - 100 mg/dL    BUN 18 6 - 20 MG/DL    Creatinine 1.29 (H) 0.55 - 1.02 MG/DL    BUN/Creatinine ratio 14 12 - 20      GFR est AA 51 (L) >60 ml/min/1.73m2    GFR est non-AA 42 (L) >60 ml/min/1.73m2    Calcium 8.9 8.5 - 10.1 MG/DL   LACTIC ACID    Collection Time: 09/21/17  5:03 PM   Result Value Ref Range    Lactic acid 1.6 0.4 - 2.0 MMOL/L       Imaging  CXR Results  (Last 48 hours)               09/21/17 1426  XR CHEST PA LAT Final result    Impression:  Impression: Bilateral pulmonary infiltrates. Narrative:  Exam:  2 view chest       Indication: Cough and wheezing       Comparison to 1/13/2017. PA and lateral views demonstrate normal heart size. Right perihilar and left   upper lobe infiltrates are noted. The osseous structures are unremarkable. CT Results  (Last 48 hours)    None          EKG: NSR 81      Assessment and Plan     Mio Vallejo is a 60 y.o. female with hx of HTN, YUDY, asthma related to allergies who is admitted for CAP. Community-Acquired Pneumonia Desatted to 88% while at PCP office. POA WBC 14.2, Tmax 100.8, satting % on RA since arrival. CXR 9/21 showed bilateral infiltrates. - Abx: s/p rocephin 1g x 1 in PCP office and levaquin 750 IV x 1 in ED. Will continue levaquin 750mg PO starting tomorrow x 4 doses for total 5 day course. - Blood Cx pending  - daily CBC w diff to follow leukocytosis (POA WBC 14.2)  - tylenol PRN for fever  - oxygen PRN for sats above 90%, currently maintaining sats well on RA alone. Risk for TIFFANIE (RIFLE criteria) - no CKD. 2/2 decreased PO intake the past few days  - POA Cr 1.29, baseline 0.85    - IVF NS @ 110cc/hr, encourage PO intake  - monitor w daily BMP    Hypokalemia - POA K 3.0, patient on potassium tablets outpatient to take with HCTZ, did not take today.  Also may be related to albuterol in duonebs pt received x 2 today. - s/p 40mEq repletion in ED, ordered another 40mEq to be administered in a few hours  - follow w daily BMP and replete PRN    Hypertension - Pt normotensive on admission, 116-123/57-58. Did not take BP meds this AM.   - Currently holding home medications of amlodipine 5, HCTZ 25mg. Can restart as if BPs rise. - Will continue to monitor at this time and readjust as BP's trend. YUDY - stable, did not take today  - continue home effexor 37.5mg daily, to start tomorrow      FEN/GI - Regular diet. NS at 110 mL/hr. Activity - Ambulate as tolerated  DVT prophylaxis - Sub Q Heparin  GI prophylaxis - Not indicated at this time  Fall prophylaxis - Not indicated at this time. Disposition - Admit to Medical. Plan to d/c to Home. Code Status - Full, discussed with patient / caregivers. Next of Kin Name and Hong [address] , Keith Sampson - [phone]    Patient Anaid Auguste will be discussed Dr. German Peralta.     6:58 PM, 09/21/17  Kita Pantoja MD  Family Medicine Resident       For Billing    Chief Complaint   Patient presents with    Chest Congestion   [facility] Cough       Hospital Problems  Date Reviewed: 5/19/2017          Codes Class Noted POA    Pneumonia ICD-10-CM: J18.9  ICD-9-CM: 257  9/21/2017 Unknown

## 2017-09-21 NOTE — TELEPHONE ENCOUNTER
Spoke with patients  on [facility] who stated that patient was seen on recently for flu like symptoms. Patients  stated that patient has been wheezing and coughing still.  stated that it has been difficult for patient to breath.  wanting to know if a prescription could be sent or want would be the next step.  stated that patient denies any chest pain. Advised  patient should be seen to be evaluated.  verbalized understanding and patient was scheduled today (9/21/17) at 1:15pm with Dr. Arpita Batista.

## 2017-09-21 NOTE — PROGRESS NOTES
Chief Complaint   Patient presents with    Wheezing     PT is being seen due to wheezing X 3 days. PT feels fatigued and drained.  PT has lack of appetite

## 2017-09-21 NOTE — PROGRESS NOTES
[facility]: MED RECONCILIATION    Comments/Recommendations:    Patient is awake, alert, and knowledgeable about home medications    Verifies allergies   Reports compliance to prescribed regimen   Verifies pharmacy listed in the EMR   The patient rarely checks her blood pressure at home   Doxycycline is a new prescription which was sent to the pharmacy today but the patient has not start it yet   The patient received a nebulized treatment and one dose of IM ceftriaxone in the doctor's office today. Medications added:     · MVI  · Chlorpheniramine-dm     Medications removed:    · Omeprazole    Medications adjusted:    · Diclofenac changed to BID scheduled    Allergies: Erythromycin    Prior to Admission Medications:       Prior to Admission Medications   Prescriptions Last Dose Informant Patient Reported? Taking? albuterol-ipratropium (DUO-NEB) 2.5 mg-0.5 mg/3 ml nebu 2017 at Unknown time  No Yes   Sig: 3 mL by Nebulization route now for 1 dose. amLODIPine (NORVASC) 5 mg tablet 2017 at pm  No Yes   Sig: Take 1 Tab by mouth daily. cefTRIAXone (ROCEPHIN) 1 gram injection 2017 at am  No Yes   Si g by IntraMUSCular route once for 1 dose. chlorpheniramine-dm (CORICIDIN HBP COUGH AND COLD) 4-30 mg tab 2017 at pm  Yes Yes   Sig: Take 1 Tab by mouth every six (6) hours as needed (cough and cold symptoms). diclofenac EC (VOLTAREN) 75 mg EC tablet 2017 at Unknown time  Yes Yes   Sig: Take 75 mg by mouth two (2) times a day. doxycycline (VIBRAMYCIN) 100 mg capsule   No Yes   Sig: Take 1 Cap by mouth two (2) times a day for 10 days. hydrochlorothiazide (HYDRODIURIL) 25 mg tablet 2017 at Unknown time  No Yes   Sig: Take 1 Tab by mouth daily. losartan (COZAAR) 25 mg tablet 2017 at Unknown time  No Yes   Sig: Take 1 Tab by mouth daily. multivitamin (ONE A DAY) tablet 2017 at Unknown time  Yes Yes   Sig: Take 1 Tab by mouth daily.    potassium chloride (KLOR-CON) 10 mEq tablet 9/20/2017 at Unknown time  No Yes   Sig: TAKE 1 CAPSULE BY MOUTH TWICE DAILY   venlafaxine-SR (EFFEXOR-XR) 37.5 mg capsule 9/20/2017 at Unknown time  No Yes   Sig: TAKE 1 CAPSULE BY MOUTH EVERY DAY      Facility-Administered Medications: None     Thank you,    Zee Hernandez, PharmD, BCPS

## 2017-09-21 NOTE — PROGRESS NOTES
Miguelito Neumann is a 60 y.o. female who presents for cough and congestion for 4 days. She was seen in clinic 2 days ago and dx'ed with viral URI. Sx have worsened. She feels like she is wheezing. No h/o asthma but has allergies to dogs and cats and sometimes wheezes when around them. She has used an albuterol inhaler in the past for her allergies but this is rare. Never used daily medications for her breathing or required nebulizer. No smoking. No fever. No cp. Feels a little sob during cough episodes. Cough mildly productive. Meds:   Current Outpatient Prescriptions   Medication Sig Dispense Refill    diclofenac EC (VOLTAREN) 75 mg EC tablet TAKE 1 TABLET BY MOUTH TWICE DAILY AS NEEDED 60 Tab 0    potassium chloride (KLOR-CON) 10 mEq tablet TAKE 1 CAPSULE BY MOUTH TWICE DAILY 180 Tab 0    venlafaxine-SR (EFFEXOR-XR) 37.5 mg capsule TAKE 1 CAPSULE BY MOUTH EVERY DAY 30 Cap 0    venlafaxine-SR (EFFEXOR-XR) 37.5 mg capsule TK 1 C PO D  1    omeprazole (PRILOSEC) 20 mg capsule Take 1 Cap by mouth daily. (attempting to wean off) 30 Cap 6    hydrochlorothiazide (HYDRODIURIL) 25 mg tablet Take 1 Tab by mouth daily. 90 Tab 3    losartan (COZAAR) 25 mg tablet Take 1 Tab by mouth daily. 90 Tab 3    amLODIPine (NORVASC) 5 mg tablet Take 1 Tab by mouth daily. 90 Tab 3       Allergies:    Allergies   Allergen Reactions    Erythromycin Nausea Only       Smoker:  History   Smoking Status    Never Smoker   Smokeless Tobacco    Never Used       ETOH:   History   Alcohol Use    0.6 oz/week    1 Glasses of wine per week     Comment: rarely       FH:   Family History   Problem Relation Age of Onset    Breast Cancer Mother 59    Hypertension Father     Cancer Sister     Colon Cancer Sister     Psychiatric Disorder Brother      schizophrenia    Cancer Other     Colon Cancer Other        ROS:  General/Constitutional:   No headache, fever, fatigue, weight loss or weight gain       Eyes:   No redness, pruritis, pain, visual changes, swelling, or discharge      Ears:    No pain, loss or changes in hearing     Nose: Nasal congestion and rhinorrea  Neck:   No swelling, masses, stiffness, pain, or limited movement     Cardiac:    No chest pain      Respiratory:  cough   GI:   No nausea/vomiting, diarrhea, abdominal pain, bloody or dark stools       Skin: No rash     Physical Exam:  Visit Vitals    /74 (BP 1 Location: Left arm, BP Patient Position: Sitting)    Pulse 98    Temp 99.1 °F (37.3 °C) (Oral)    Resp 20    Ht 5' 4\" (1.626 m)    Wt 164 lb (74.4 kg)    SpO2 94%    BMI 28.15 kg/m2     General: Alert and oriented, in no acute distress. Responds to all questions appropriately. SKIN: No rash. Normal color. EYES: Conjunctiva are clear;   EARS: External normal, canals clear, tympanic membranes normal.  NOSE: Edema, erythema, clear mucous drainage. OROPHARYNX: Slight tonsil edema, erythema, no exudate. NECK: Supple; no masses; normal lymphadenopathy. LUNGS: Respirations unlabored; diffuse mild rhonchi. CARDIOVASCULAR: Regular, rate, and rhythm without murmurs, gallops or rubs. EXTREMITIES: No edema, cyanosis or clubbing. NEUROLOGIC: Speech intact; face symmetrical; moves all extremities equally    Medications:   1. Duoneb x 1.   2. Ceftriaxone 1g IM x1. CXR: Radiographs personally reviewed and demonstrates right perihilar and TACHO pneumonia    Assessment:    ICD-10-CM ICD-9-CM    1. URI, acute J06.9 465.9     2.  CAP    Plan:  Patients Pulse Ox decreased while waiting after getting ceftriaxone injection. Pulse Ox 86-90% on RA. She reports feeling no different. Due to bilateral PNA, age, and pulse Ox w/ tachycardia, then will recommend her to go to ER. Blood cx drawn prior to sending to ER since Abx given.  will taker her to the ER. She is currently HDS.

## 2017-09-21 NOTE — PROGRESS NOTES
After obtaining consent, and per orders of Dr. Luis Manuel Serrano, injection of Rocephin 1 gram given by Redd Colvin LPN. Patient instructed to remain in clinic for 20 minutes afterwards, and to report any adverse reaction to me immediately.

## 2017-09-21 NOTE — PROGRESS NOTES
[address] [facility]   Senior Resident Admission Note    CC: cough    HPI:  Concepción Maravilla is a 60 y.o. female with HTN, YUDY who presents to the ER complaining of cough. Reports 4 day of worsening mildly productive cough, malaise, wheezing. Seen at PCP office on 9/19, rapid strep and flu negative. Diagnosed as viral URI and sent home. Returned to PCP office today where CXR revealed b/l infiltrates (R perihilar, TACHO). Her SpO2 ranged between 86-90% on RA. Blood culture was drawn and patient given duo-neb, IM ceftriaxone in the office before being sent to the ER for additional treatment. Patient reports no improvement in her symptoms s/p duo-neb. Upon arrival to the ER patient developed fever 100.8, which dropped to 99.8 s/p tylenol. RR 19, SpO2 92% on RA, HR 94. Labs remarkable for WBC 14.2, K 3.0, Cr 1.29 (baseline 0.8). Lactic acid 1.6. Given duo-neb (no improvement again per patient), levaquin, 1 L bolus. Chart reviewed. Patient seen, examined, and discussed with Dr. Josseline Carrington (PGY-1). See her note for more details. Physical exam  Gen: NAD, alert, cooperative  Resp: Diffuse rhonchi b/l, L slightly worse than R.   CV: Regular rhythm, tachycardic, no m/r/g    A/P: 61 yo female with HTN, YUDY admitted for CAP, at risk TIFFANIE. 1. CAP  R perihilar, L upper lobe PNA on CXR. WBC 14.2. Diffuse rhonchi on exam, with no improvement in subjective wheezing s/p duo-nebs. Given rocephin in office, levaquin in ER. Maintaining SpO2 on RA. Fever today @ 1709, 100.8.   - Admit under observation to medical floor  - Transition from IV to PO levaquin tomorrow  - Tylenol PRN fever  - f/u blood culture    2. Leukocytosis  Due to above. 3. At risk TIFFANIE  Creatinine 1.29 on admission, baseline 0.8. Likely d/t poor PO intake, insensible losses during acute illness. - IVF, encourage PO intake    4. HTN  BP at goal on admission. Did not take amlodipine, losartan, HCTZ today.   - Resume anti-HTN as needed    5. YUDY  Stable. Did not take effexor today. - Resume effexor tonight    6. Hypokalemia  Incidental finding on admission, though in setting of duo-neb. Takes PO repletion (10 mEq BID) at home.  - Replete electrolytes PRN      I agree with remaining assessment and plan as documented in Dr. Cardoza Needle note.       Pt discussed with Dr. Hesham Ordonez (on-call attending physician)    Christelle Green MD  Family Medicine Resident

## 2017-09-21 NOTE — ED NOTES
Bedside and Verbal shift change report given to Jose Ramon Dooley RN (oncoming nurse) by Edgardo Hernandez RN (offgoing nurse). Report included the following information SBAR, ED Summary, MAR, Recent Results and Cardiac Rhythm NSR.

## 2017-09-21 NOTE — ED PROVIDER NOTES
HPI Comments: 71-year-old white female presents to the emergency department with cough, congestion. Patient has had cough for the last 4 days. The cough has been productive. She also reports nasal congestion. In addition, patient feels fatigued. Patient saw her PCP several days ago who were treating her for flulike illness. She is at PCP office about an hour ago. He diagnosed her with pneumonia on chest x-ray. The plan was to discharge her, but her oxygen saturations fell as she was waiting to leave. She did receive 1 g of ceftriaxone IM. Patient also received one nebulizer treatment in the office. The patient reports that currently she feels fatigued and drained. Patient states she does have a history of asthma related to allergies. She denies any tobacco history. Patient denies fevers. No chest pain. No abdominal pain. No vomiting or diarrhea. The history is provided by the patient and the spouse. Past Medical History:   Diagnosis Date    Hypertension        Past Surgical History:   Procedure Laterality Date    HX  SECTION           Family History:   Problem Relation Age of Onset    Breast Cancer Mother 59    Hypertension Father     Cancer Sister     Colon Cancer Sister     Psychiatric Disorder Brother      schizophrenia    Cancer Other     Colon Cancer Other        Social History     Social History    Marital status:      Spouse name: N/A    Number of children: N/A    Years of education: N/A     Occupational History    Not on file.      Social History Main Topics    Smoking status: Never Smoker    Smokeless tobacco: Never Used    Alcohol use 0.6 oz/week     1 Glasses of wine per week      Comment: rarely    Drug use: No    Sexual activity: No     Other Topics Concern    Not on file     Social History Narrative    Lives with     Daughter Everette Yañez  2014 from colon cancer soon after having his son    Works at home but still works in [employer] 2 days per week,          ALLERGIES: Erythromycin    Review of Systems   Constitutional: Negative for fever. HENT: Positive for congestion. Negative for facial swelling and nosebleeds. Eyes: Negative for pain. Respiratory: Positive for cough, shortness of breath and wheezing. Negative for chest tightness. Cardiovascular: Negative for chest pain and leg swelling. Gastrointestinal: Negative for abdominal pain, diarrhea and vomiting. Endocrine: Negative for polyuria. Genitourinary: Negative for difficulty urinating and flank pain. Musculoskeletal: Negative for arthralgias and back pain. Skin: Negative for color change. Allergic/Immunologic: Negative for immunocompromised state. Neurological: Negative for dizziness and headaches. Hematological: Does not bruise/bleed easily. Psychiatric/Behavioral: Negative for agitation. All other systems reviewed and are negative. Vitals:    09/21/17 1543   BP: 116/58   Pulse: 94   Resp: 19   Temp: 98.2 °F (36.8 °C)   SpO2: 92%   Weight: 74.4 kg (164 lb)   Height: 5' 4\" (1.626 m)            Physical Exam   Constitutional: She is oriented to person, place, and time. She appears well-developed and well-nourished. HENT:   Head: Normocephalic and atraumatic. Right Ear: External ear normal.   Left Ear: External ear normal.   Nose: Nose normal.   Mouth/Throat: Oropharynx is clear and moist.   Eyes: EOM are normal. Pupils are equal, round, and reactive to light. No scleral icterus. Neck: Normal range of motion. Neck supple. No JVD present. No tracheal deviation present. No thyromegaly present. Cardiovascular: Normal rate, regular rhythm, normal heart sounds and intact distal pulses. Exam reveals no friction rub. No murmur heard. Pulmonary/Chest: Effort normal. No stridor. No respiratory distress. She has wheezes. She has no rales. She exhibits no tenderness. Scattered wheezes bilaterally   Abdominal: Soft.  Bowel sounds are normal. She exhibits no distension. There is no tenderness. There is no rebound and no guarding. Musculoskeletal: Normal range of motion. She exhibits no edema, tenderness or deformity. Lymphadenopathy:     She has no cervical adenopathy. Neurological: She is alert and oriented to person, place, and time. She has normal reflexes. No cranial nerve deficit. She exhibits normal muscle tone. Coordination normal.   Skin: Skin is warm and dry. No rash noted. No erythema. Psychiatric: She has a normal mood and affect. Her behavior is normal. Judgment and thought content normal.   Nursing note and vitals reviewed. MDM  Number of Diagnoses or Management Options  Community acquired pneumonia:   Dehydration:   Hypokalemia:   Hypoxia:   Leukocytosis, unspecified type:   Diagnosis management comments: Patient looks well and nontoxic. Chest x-ray reviewed showed pneumonia. Will treat with Levaquin and some IV fluids. Will check basic blood work. Will reassess after treatment is complete. Amount and/or Complexity of Data Reviewed  Clinical lab tests: ordered and reviewed  Tests in the radiology section of CPT®: reviewed  Tests in the medicine section of CPT®: ordered and reviewed  Decide to obtain previous medical records or to obtain history from someone other than the patient: yes  Obtain history from someone other than the patient: yes  Review and summarize past medical records: yes  Independent visualization of images, tracings, or specimens: yes    Risk of Complications, Morbidity, and/or Mortality  Presenting problems: high  Diagnostic procedures: high  Management options: high    Critical Care  Total time providing critical care: 30-74 minutes (Total critical care time spend exclusive of procedures:  30 min)    Patient Progress  Patient progress: improved    ED Course       Procedures    4:31 PM  EKG interpretation: (Preliminary)  Rhythm: normal sinus rhythm; and regular .  Rate (approx.): 81 bpm; Axis: normal; P wave: normal; QRS interval: normal ; ST/T wave: normal;   Note written by karl Wilburn, as dictated by Bryanna Morales MD 4:31 PM    5:10 PM  Neb treatment being done now. Blood work just sent. IVF are running. Levaquin given IV. Will discuss dispo with pt shortly. 5:44 PM  Pt was re-assessed. Pt continues to feel fatigued. Pt presented with fever and CXR showed PNA. Pt advises that she would preferred to be admitted to the hospital.  Will consult the family medicine team to admit the patient. Pt with hypoxia in PCP office earlier today with O2 sats of 89%    CONSULT NOTE:  6:11 PM Bryanna Morales MD spoke with [facility] resident who reports that Dr. Carina Lombardi is the attending physician today, Consult for [facility]. Discussed available diagnostic tests and clinical findings. He is in agreement with care plans as outlined. Family practice resident agrees to come see and admit the patient.

## 2017-09-22 PROBLEM — J18.9 CAP (COMMUNITY ACQUIRED PNEUMONIA): Status: ACTIVE | Noted: 2017-09-22

## 2017-09-22 LAB
ANION GAP SERPL CALC-SCNC: 11 MMOL/L (ref 5–15)
ATRIAL RATE: 81 BPM
BASOPHILS # BLD: 0 K/UL (ref 0–0.1)
BASOPHILS NFR BLD: 0 % (ref 0–1)
BUN SERPL-MCNC: 11 MG/DL (ref 6–20)
BUN/CREAT SERPL: 15 (ref 12–20)
CALCIUM SERPL-MCNC: 8.2 MG/DL (ref 8.5–10.1)
CALCULATED P AXIS, ECG09: -4 DEGREES
CALCULATED R AXIS, ECG10: 45 DEGREES
CALCULATED T AXIS, ECG11: 36 DEGREES
CHLORIDE SERPL-SCNC: 103 MMOL/L (ref 97–108)
CO2 SERPL-SCNC: 26 MMOL/L (ref 21–32)
CREAT SERPL-MCNC: 0.75 MG/DL (ref 0.55–1.02)
DIAGNOSIS, 93000: NORMAL
EOSINOPHIL # BLD: 0 K/UL (ref 0–0.4)
EOSINOPHIL NFR BLD: 0 % (ref 0–7)
ERYTHROCYTE [DISTWIDTH] IN BLOOD BY AUTOMATED COUNT: 13.2 % (ref 11.5–14.5)
GLUCOSE SERPL-MCNC: 142 MG/DL (ref 65–100)
HCT VFR BLD AUTO: 36.6 % (ref 35–47)
HGB BLD-MCNC: 12.6 G/DL (ref 11.5–16)
LYMPHOCYTES # BLD: 1.2 K/UL (ref 0.8–3.5)
LYMPHOCYTES NFR BLD: 11 % (ref 12–49)
MCH RBC QN AUTO: 31.7 PG (ref 26–34)
MCHC RBC AUTO-ENTMCNC: 34.4 G/DL (ref 30–36.5)
MCV RBC AUTO: 92 FL (ref 80–99)
MONOCYTES # BLD: 0.9 K/UL (ref 0–1)
MONOCYTES NFR BLD: 9 % (ref 5–13)
NEUTS SEG # BLD: 8.6 K/UL (ref 1.8–8)
NEUTS SEG NFR BLD: 80 % (ref 32–75)
P-R INTERVAL, ECG05: 138 MS
PLATELET # BLD AUTO: 282 K/UL (ref 150–400)
POTASSIUM SERPL-SCNC: 3.3 MMOL/L (ref 3.5–5.1)
Q-T INTERVAL, ECG07: 412 MS
QRS DURATION, ECG06: 92 MS
QTC CALCULATION (BEZET), ECG08: 478 MS
RBC # BLD AUTO: 3.98 M/UL (ref 3.8–5.2)
SODIUM SERPL-SCNC: 140 MMOL/L (ref 136–145)
VENTRICULAR RATE, ECG03: 81 BPM
WBC # BLD AUTO: 10.8 K/UL (ref 3.6–11)

## 2017-09-22 PROCEDURE — 36415 COLL VENOUS BLD VENIPUNCTURE: CPT

## 2017-09-22 PROCEDURE — 77030027138 HC INCENT SPIROMETER -A

## 2017-09-22 PROCEDURE — 74011250637 HC RX REV CODE- 250/637: Performed by: FAMILY MEDICINE

## 2017-09-22 PROCEDURE — 74011000258 HC RX REV CODE- 258: Performed by: FAMILY MEDICINE

## 2017-09-22 PROCEDURE — 74011250636 HC RX REV CODE- 250/636: Performed by: FAMILY MEDICINE

## 2017-09-22 PROCEDURE — 74011250637 HC RX REV CODE- 250/637: Performed by: HOSPITALIST

## 2017-09-22 PROCEDURE — 74011000250 HC RX REV CODE- 250: Performed by: FAMILY MEDICINE

## 2017-09-22 PROCEDURE — 85025 COMPLETE CBC W/AUTO DIFF WBC: CPT

## 2017-09-22 PROCEDURE — 65270000029 HC RM PRIVATE

## 2017-09-22 PROCEDURE — 80048 BASIC METABOLIC PNL TOTAL CA: CPT

## 2017-09-22 PROCEDURE — 99218 HC RM OBSERVATION: CPT

## 2017-09-22 RX ORDER — POTASSIUM CHLORIDE 750 MG/1
20 TABLET, FILM COATED, EXTENDED RELEASE ORAL
Status: DISCONTINUED | OUTPATIENT
Start: 2017-09-22 | End: 2017-09-22

## 2017-09-22 RX ORDER — POTASSIUM CHLORIDE 750 MG/1
40 TABLET, FILM COATED, EXTENDED RELEASE ORAL
Status: COMPLETED | OUTPATIENT
Start: 2017-09-22 | End: 2017-09-22

## 2017-09-22 RX ORDER — PROCHLORPERAZINE EDISYLATE 5 MG/ML
5 INJECTION INTRAMUSCULAR; INTRAVENOUS
Status: DISCONTINUED | OUTPATIENT
Start: 2017-09-22 | End: 2017-09-23 | Stop reason: HOSPADM

## 2017-09-22 RX ORDER — ACETAMINOPHEN 325 MG/1
650 TABLET ORAL
Status: DISCONTINUED | OUTPATIENT
Start: 2017-09-22 | End: 2017-09-23 | Stop reason: HOSPADM

## 2017-09-22 RX ORDER — ONDANSETRON 2 MG/ML
4 INJECTION INTRAMUSCULAR; INTRAVENOUS
Status: DISCONTINUED | OUTPATIENT
Start: 2017-09-22 | End: 2017-09-22

## 2017-09-22 RX ORDER — BENZONATATE 100 MG/1
100 CAPSULE ORAL
Status: DISCONTINUED | OUTPATIENT
Start: 2017-09-22 | End: 2017-09-23 | Stop reason: HOSPADM

## 2017-09-22 RX ADMIN — SODIUM CHLORIDE 110 ML/HR: 900 INJECTION, SOLUTION INTRAVENOUS at 14:45

## 2017-09-22 RX ADMIN — Medication 10 ML: at 21:27

## 2017-09-22 RX ADMIN — BENZONATATE 100 MG: 100 CAPSULE ORAL at 17:09

## 2017-09-22 RX ADMIN — VENLAFAXINE HYDROCHLORIDE 37.5 MG: 37.5 CAPSULE, EXTENDED RELEASE ORAL at 08:37

## 2017-09-22 RX ADMIN — CEFTRIAXONE SODIUM 1 G: 1 INJECTION, POWDER, FOR SOLUTION INTRAMUSCULAR; INTRAVENOUS at 08:36

## 2017-09-22 RX ADMIN — POTASSIUM CHLORIDE 40 MEQ: 750 TABLET, FILM COATED, EXTENDED RELEASE ORAL at 14:40

## 2017-09-22 RX ADMIN — PROCHLORPERAZINE EDISYLATE 5 MG: 5 INJECTION INTRAMUSCULAR; INTRAVENOUS at 06:45

## 2017-09-22 RX ADMIN — ACETAMINOPHEN 650 MG: 325 TABLET ORAL at 17:11

## 2017-09-22 RX ADMIN — DOXYCYCLINE 100 MG: 100 INJECTION, POWDER, LYOPHILIZED, FOR SOLUTION INTRAVENOUS at 21:25

## 2017-09-22 RX ADMIN — POTASSIUM CHLORIDE 40 MEQ: 750 TABLET, FILM COATED, EXTENDED RELEASE ORAL at 08:36

## 2017-09-22 RX ADMIN — POTASSIUM CHLORIDE 20 MEQ: 750 TABLET, FILM COATED, EXTENDED RELEASE ORAL at 06:39

## 2017-09-22 RX ADMIN — ACETAMINOPHEN 650 MG: 325 TABLET ORAL at 06:39

## 2017-09-22 RX ADMIN — Medication 10 ML: at 14:00

## 2017-09-22 RX ADMIN — DOXYCYCLINE 100 MG: 100 INJECTION, POWDER, LYOPHILIZED, FOR SOLUTION INTRAVENOUS at 09:46

## 2017-09-22 NOTE — PROGRESS NOTES
Primary Nurse Reyes Amaro and [address] Lozoya, ASHA performed a dual skin assessment on this patient No impairment noted  Kit score is 23

## 2017-09-22 NOTE — PROGRESS NOTES
[address] [place] [address]   Office [phone]  Fax [phone]          Assessment and Plan     Desiree Steen is a 60 y.o. female admitted for with hx of HTN, YUDY, asthma related to allergies who is admitted for CAP. She has spent 1 night(s) in the hospital.    24 Hour Events: No acute events. - Maintained sats >92% on RA, remained afebrile since ED    Community-Acquired Pneumonia Desatted to 88% while at PCP office. POA WBC 14.2, Tmax 100.8, satting % on RA since arrival. CXR 9/21 showed bilateral infiltrates. - Abx: will change to doxycycline given QTc prolongation seen on POA EKG  - Blood Cx pending  - POA WBC 14.2 --> 10.8 this AM, leukocytosis resolved  - tylenol PRN for fever - afebrile overnight  - oxygen PRN for sats above 90%, currently maintaining sats well on RA alone     Nausea - started overnight, likely a side effect from abx given yesterday on an empty stomach, pt with minimal PO intake the past few days and overnight  - encourage PO intake  - PRN compazine - pt has prolonged QTc (478)     Risk for TIFFANIE (RIFLE criteria) - RESOLVED. No CKD. 2/2 decreased PO intake the past few days  - POA Cr 1.29, baseline 0.85 --> Cr 0.75 this AM   - IVF NS @ 110cc/hr, encourage PO intake. Can d/c IVF once patient has increased PO intake. - monitor w daily BMP     Hypokalemia - POA K 3.0, patient on potassium tablets outpatient to take with HCTZ, did not take on day of admission and also received duonebs x 2, albuterol may have affected potassium.   - s/p repletion in ED yesterday  - K this AM 3.3, ordered PO repletion   - follow w daily BMP and replete PRN     Hypertension - Pt normotensive on admission, 116-123/57-58. Did not take BP meds this AM.   - Currently holding home medications of amlodipine 5, HCTZ 25mg. Can restart as if BPs rise. - Will continue to monitor at this time and readjust as BP's trend.   - Pt actually hypotensive overnight, lowest 86/54, highest 112/62. Continue to hold home antihypertensives and monitor BP.      YUDY - stable. - continue home effexor 37.5mg daily, to start today      FEN/GI - Regular diet. NS at 110 mL/hr. Activity - Ambulate as tolerated  DVT prophylaxis - Sub Q Heparin  GI prophylaxis - Not indicated at this time  Fall prophylaxis - Not indicated at this time. Unit - Medical  Admission Status - Under Observation  IV Access - 2 peripheral IV  Disposition - Plan to d/c to Home. Code Status - Full    I appreciate the opportunity to participate in the care of this patient,  Natty Dotson MD  Family Medicine Resident         Subjective / Objective     Subjective  Temp (24hrs), Av.6 °F (37.6 °C), Min:98.2 °F (36.8 °C), Max:100.8 °F (38.2 °C)  This AM patient reports she feels \"worse than yesterday\", now has nausea and retching. Continues to have nonproductive cough with subjective SOB during coughing, no SOB otherwise. Not much change in symptoms compared to yesterday. Denies fever/chills, HA, dizziness, abdominal pain, diarrhea. Has had some ginger ale which helps with the nausea and tried to eat a frozen TV dinner but did not eat much as she did not like it. No other complaints at this time. Objective  Respiratory:   O2 Device: Room air   Visit Vitals    BP 99/58 (BP 1 Location: Left arm)    Pulse 93    Temp 99.7 °F (37.6 °C)    Resp 16    Ht 5' 4\" (1.626 m)    Wt 164 lb (74.4 kg)    SpO2 93%    BMI 28.15 kg/m2     General Appearance:  Comfortable, well-appearing and in no acute distress. Lungs:  Normal respiratory rate and normal effort. She is not in respiratory distress. There are diffuse rhonchi. Heart: Normal rate. Regular rhythm. Chest: No chest wall tenderness. Symmetric chest wall expansion. Neurological: Patient is alert and oriented to person, place and time. Abdomen: Abdomen is soft and non-distended. Bowel sounds are normal.   There is no abdominal tenderness.      I/O:       CBC:  Recent Labs      09/22/17   0429  09/21/17   1703   WBC  10.8  14.2*   HGB  12.6  14.1   HCT  36.6  41.4   PLT  282  841       Metabolic Panel:  Recent Labs      09/21/17   1703   NA  139   K  3.0*   CL  96*   CO2  33*   BUN  18   CREA  1.29*   GLU  107*   CA  8.9          For Billing    Chief Complaint   Patient presents with    Chest Congestion    Cough       Hospital Problems  Date Reviewed: 5/19/2017          Codes Class Noted POA    Pneumonia ICD-10-CM: J18.9  ICD-9-CM: 219  9/21/2017 Unknown

## 2017-09-22 NOTE — PROGRESS NOTES
9/22/2017  9:58 AM  EMR reviewed, I met w/ Pt for needs assessment and D/C planning. CHarted demographics gurmeet, lives w/  Vonnie Langfordor (D)[phone] in 1 story home w/ 3 entry steps, PTA independent w/ ADL's and drives. PCP is Velia Miguel DO; Pt has Rx [facility] fills [facility]. Pt has not had HH; uses no DME. Family will transport home and to all F/U. Care Management Interventions  PCP Verified by CM: Yes (PCP is Velia Miguel DO)  Last Visit to PCP: 09/21/17  Palliative Care Consult (Criteria: CHF and RRAT>21): No  Reason for No Palliative Care Consult:  Other (see comment) (Pt RRAT is 13)  Mode of Transport at Discharge: Self  Discharge Durable Medical Equipment: No  Physical Therapy Consult: No  Occupational Therapy Consult: No  Speech Therapy Consult: No  Current Support Network: Lives with Spouse (Pt lives w/  Vonnie Langfordor (C) [phone])  Confirm Follow Up Transport: Family  Discharge Location  Discharge Placement: Home  Plan is to D/C to home when stable; CM to follow and assist.  Ashley Glover  Case Management

## 2017-09-22 NOTE — PROGRESS NOTES
Bedside shift change report given to LUCY RN (oncoming nurse) by Marta Sarkar RN (offgoing nurse). Report included the following information SBAR, Kardex, Intake/Output, MAR and Accordion.

## 2017-09-22 NOTE — ED NOTES
TRANSFER - OUT REPORT:    Verbal report given to Pennie Stevens RN(name) on Khoi Schaefer  being transferred to [facility](unit) for routine progression of care       Report consisted of patients Situation, Background, Assessment and   Recommendations(SBAR). Information from the following report(s) SBAR, Kardex, ED Summary and Recent Results was reviewed with the receiving nurse. Lines:   Peripheral IV 09/21/17 Left Antecubital (Active)   Site Assessment Clean, dry, & intact 9/21/2017  5:00 PM   Phlebitis Assessment 0 9/21/2017  5:00 PM   Infiltration Assessment 0 9/21/2017  5:00 PM   Dressing Status Clean, dry, & intact 9/21/2017  5:00 PM   Dressing Type Tape;Transparent 9/21/2017  5:00 PM   Hub Color/Line Status Pink;Flushed;Patent 9/21/2017  5:00 PM       Peripheral IV 09/21/17 Right Forearm (Active)   Site Assessment Clean, dry, & intact 9/21/2017  5:03 PM   Phlebitis Assessment 0 9/21/2017  5:03 PM   Infiltration Assessment 0 9/21/2017  5:03 PM   Dressing Status Clean, dry, & intact 9/21/2017  5:03 PM   Dressing Type Tape;Transparent 9/21/2017  5:03 PM   Hub Color/Line Status Pink;Flushed;Patent 9/21/2017  5:03 PM        Opportunity for questions and clarification was provided.       Patient transported with:   [facility]

## 2017-09-23 VITALS
HEIGHT: 64 IN | SYSTOLIC BLOOD PRESSURE: 132 MMHG | RESPIRATION RATE: 16 BRPM | HEART RATE: 74 BPM | BODY MASS INDEX: 28 KG/M2 | OXYGEN SATURATION: 90 % | DIASTOLIC BLOOD PRESSURE: 69 MMHG | WEIGHT: 164 LBS | TEMPERATURE: 98.4 F

## 2017-09-23 LAB
ANION GAP SERPL CALC-SCNC: 10 MMOL/L (ref 5–15)
BASOPHILS # BLD: 0 K/UL (ref 0–0.1)
BASOPHILS NFR BLD: 0 % (ref 0–1)
BUN SERPL-MCNC: 8 MG/DL (ref 6–20)
BUN/CREAT SERPL: 13 (ref 12–20)
CALCIUM SERPL-MCNC: 8.5 MG/DL (ref 8.5–10.1)
CHLORIDE SERPL-SCNC: 106 MMOL/L (ref 97–108)
CO2 SERPL-SCNC: 26 MMOL/L (ref 21–32)
CREAT SERPL-MCNC: 0.61 MG/DL (ref 0.55–1.02)
EOSINOPHIL # BLD: 0 K/UL (ref 0–0.4)
EOSINOPHIL NFR BLD: 1 % (ref 0–7)
ERYTHROCYTE [DISTWIDTH] IN BLOOD BY AUTOMATED COUNT: 13.2 % (ref 11.5–14.5)
GLUCOSE SERPL-MCNC: 113 MG/DL (ref 65–100)
HCT VFR BLD AUTO: 35.5 % (ref 35–47)
HGB BLD-MCNC: 12 G/DL (ref 11.5–16)
LYMPHOCYTES # BLD: 1.6 K/UL (ref 0.8–3.5)
LYMPHOCYTES NFR BLD: 34 % (ref 12–49)
MCH RBC QN AUTO: 31.1 PG (ref 26–34)
MCHC RBC AUTO-ENTMCNC: 33.8 G/DL (ref 30–36.5)
MCV RBC AUTO: 92 FL (ref 80–99)
MONOCYTES # BLD: 0.6 K/UL (ref 0–1)
MONOCYTES NFR BLD: 13 % (ref 5–13)
NEUTS SEG # BLD: 2.4 K/UL (ref 1.8–8)
NEUTS SEG NFR BLD: 52 % (ref 32–75)
PLATELET # BLD AUTO: 255 K/UL (ref 150–400)
POTASSIUM SERPL-SCNC: 3.8 MMOL/L (ref 3.5–5.1)
RBC # BLD AUTO: 3.86 M/UL (ref 3.8–5.2)
SODIUM SERPL-SCNC: 142 MMOL/L (ref 136–145)
WBC # BLD AUTO: 4.6 K/UL (ref 3.6–11)

## 2017-09-23 PROCEDURE — 74011000250 HC RX REV CODE- 250: Performed by: FAMILY MEDICINE

## 2017-09-23 PROCEDURE — 74011000258 HC RX REV CODE- 258: Performed by: FAMILY MEDICINE

## 2017-09-23 PROCEDURE — 85025 COMPLETE CBC W/AUTO DIFF WBC: CPT | Performed by: FAMILY MEDICINE

## 2017-09-23 PROCEDURE — 36415 COLL VENOUS BLD VENIPUNCTURE: CPT | Performed by: FAMILY MEDICINE

## 2017-09-23 PROCEDURE — 74011250636 HC RX REV CODE- 250/636: Performed by: FAMILY MEDICINE

## 2017-09-23 PROCEDURE — 74011250637 HC RX REV CODE- 250/637: Performed by: FAMILY MEDICINE

## 2017-09-23 PROCEDURE — 80048 BASIC METABOLIC PNL TOTAL CA: CPT | Performed by: FAMILY MEDICINE

## 2017-09-23 RX ORDER — DOXYCYCLINE 100 MG/1
100 CAPSULE ORAL 2 TIMES DAILY
Qty: 17 CAP | Refills: 0 | Status: SHIPPED | OUTPATIENT
Start: 2017-09-23 | End: 2017-09-23

## 2017-09-23 RX ORDER — IPRATROPIUM BROMIDE AND ALBUTEROL SULFATE 2.5; .5 MG/3ML; MG/3ML
3 SOLUTION RESPIRATORY (INHALATION)
Status: CANCELLED | OUTPATIENT
Start: 2017-09-23

## 2017-09-23 RX ORDER — HYDROCHLOROTHIAZIDE 25 MG/1
12.5 TABLET ORAL DAILY
Status: DISCONTINUED | OUTPATIENT
Start: 2017-09-24 | End: 2017-09-23 | Stop reason: HOSPADM

## 2017-09-23 RX ORDER — DOXYCYCLINE 100 MG/1
100 CAPSULE ORAL 2 TIMES DAILY
Qty: 17 CAP | Refills: 0 | Status: SHIPPED | OUTPATIENT
Start: 2017-09-23 | End: 2017-10-02

## 2017-09-23 RX ORDER — HYDROCHLOROTHIAZIDE 25 MG/1
12.5 TABLET ORAL DAILY
Qty: 30 TAB | Refills: 0 | Status: SHIPPED | OUTPATIENT
Start: 2017-09-23 | End: 2017-10-12 | Stop reason: SDUPTHER

## 2017-09-23 RX ORDER — HYDROCHLOROTHIAZIDE 25 MG/1
25 TABLET ORAL DAILY
Status: DISCONTINUED | OUTPATIENT
Start: 2017-09-24 | End: 2017-09-23

## 2017-09-23 RX ADMIN — VENLAFAXINE HYDROCHLORIDE 37.5 MG: 37.5 CAPSULE, EXTENDED RELEASE ORAL at 08:33

## 2017-09-23 RX ADMIN — CEFTRIAXONE SODIUM 1 G: 1 INJECTION, POWDER, FOR SOLUTION INTRAMUSCULAR; INTRAVENOUS at 08:33

## 2017-09-23 RX ADMIN — DOXYCYCLINE 100 MG: 100 INJECTION, POWDER, LYOPHILIZED, FOR SOLUTION INTRAVENOUS at 10:36

## 2017-09-23 NOTE — PROGRESS NOTES
Bedside and Verbal shift change report given to [address] (oncoming nurse) by KARIN Saavedra (offgoing nurse). Report given with SBAR, Kardex, OR Summary, Intake/Output, MAR and Recent Results.

## 2017-09-23 NOTE — DISCHARGE INSTRUCTIONS
HOME DISCHARGE INSTRUCTIONS    Anselmo Krabbe / [id number] : 1955    Admission date: 2017 Discharge date: 2017     Please bring this form with you to show your care provider at your follow-up appointment. Primary care provider:  Jose Eduardo Abdi DO    Discharging provider:  Percy Cummings MD  - Family Medicine Resident  Jetty Hashimoto, MD - Attending, Family Medicine     You have been admitted to the hospital with the following diagnoses:    ACUTE DIAGNOSES:  · Pneumonia  · CAP (community acquired pneumonia)  . . . . . . . . . . . . . . . . . . . . . . . . . . . . . . . . . . . . . . . . . . . . . . . . . . . . . . . . . . . . . . . . . . . . . . . Elad Hilario FOLLOW-UP CARE RECOMMENDATIONS:    Medication changes:   -For Pneumonia please take doxycycline 100mg two times a day for 9 more days. Next dosing this evening, at 9pm.    -Also, for your blood pressure, It was good while inpatient!! NO BLOOD PRESSURE MEDICATION WAS NEEDED TO BE GIVEN. Therefore, will decrease only restart hydrochlorothiazide(HCTZ). Script given to you prior to discharge for HCTZ 12.5 mg, which is a decrease from 25 mg daily.     -Please keep a log of Blood pressure. Once you see PCP and discuss which medications to restart. Therefore do not take Amlodipine and Losartan when you get home unless your blood pressure is greater that 140/90, you can add Norvasc. Appointments: Listed on page 2 of these documents. Follow-up tests needed: See PCP, for hospital follow-up of your pneumonia and to talk about appropriate blood pressure medications. Pending test results: At the time of your discharge the following test results are still pending: None. Please make sure you review these results with your outpatient follow-up provider(s). Specific symptoms to watch for: chest pain, shortness of breath, fever, chills, nausea, vomiting, diarrhea, change in mentation, falling, weakness, bleeding.      DIET/what to eat: Regular Diet    ACTIVITY:  Activity as tolerated    Wound care: None    Equipment needed:  None    What to do if new or unexpected symptoms occur? If you experience any of the above symptoms (or should other concerns or questions arise after discharge) please call your primary care physician. Return to the emergency room if you cannot get hold of your doctor. · It is very important that you keep your follow-up appointment(s). · Please bring discharge papers, medication list (and/or medication bottles) to your follow-up appointments for review by your outpatient provider(s). · Please check the list of medications and be sure it includes every medication (even non-prescription medications) that your provider wants you to take. · It is important that you take the medication exactly as they are prescribed. · Keep your medication in the bottles provided by the pharmacist and keep a list of the medication names, dosages, and times to be taken in your wallet. · Do not take other medications without consulting your doctor. · If you have any questions about your medications or other instructions, please talk to your nurse or care provider before you leave the hospital.     Information obtained by:     I understand that if any problems occur once I am at home I am to contact my physician. These instructions were explained to me and I had the opportunity to ask questions. I understand and acknowledge receipt of the instructions indicated above.                                                                                                                                                Physician's or R.N.'s Signature                                                                  Date/Time                                                                                                                                              Patient or Representative Signature Date/Time

## 2017-09-23 NOTE — PROGRESS NOTES
1452. Discussed discharge instructions with patient and her . They verbalized understanding. Copy given. Two prescriptions given. ADONIS DC'd. Discharged via wheelchair.

## 2017-09-23 NOTE — DISCHARGE SUMMARY
[address] [address] [place] [address]   Office [phone]  Fax [phone]       Discharge      Name: Caitlin Dickens MRN: [id number]  Sex: Female   YOB: 1955  Age: 60 y.o. PCP: Bhavani Aguayo DO     Date of admission: 9/21/2017  Date of discharge/transfer: 9/23/2017    Attending physician at admission: Dr. Orlando Kebede  Attending physician at discharge/transfer: Dr. Orlando Kebede  Resident physician at discharge/transfer: Rodman Goodell, MD     Consultants during hospitalization  None     Admission diagnoses   Pneumonia  CAP (community acquired pneumonia)    Recommended follow-up after discharge  · Follow-up with PCP, for hospital follow-up of CAP, as well as evaluation of antihypertensives regimen at home     History of Present Illness    Per admitting physician, Dr Eda Rinne, Flaquita Pugh is a 60 y.o. female with known hx of HTN, YUDY, renal and hepatic cysts, and asthma related to allergies who presents to the ER complaining of cough, wheezing.      Pt has had cough, wheezing, malaise x 4 days, seen by PCP on 9/19, thought to be viral in origin. Returned today as symptoms had not improved, ordered CXR which showed bilateral lower lobe pneumonia. Was going to go home but patient desatted to 88% while in office, so was sent to ED.     Patient reports for the past 4 days feeling feverish, chills (but did not take temp), with fatigue and productive cough, wheezing, feeling SOB both at rest and with exertion. Denies N/V, abdominal pain, SP, HA, dizziness, diarrhea, urinary symptoms. Denies sick contacts but per chart review pt said earlier that  has had cough recently as well. Pt with hx of asthma but reports she has not used an inhaler in several years, her asthma is usually due to seasonal and pet allergies and can be controlled with OTC antihistamines. No hx of CAP, no recent hospitalization.      Pt was s/p rocephin 1g IM and duoneb x 1 at PCP office.  Pt reports no improvement in her symptoms after duonebs at PCP and in ER.      In the ED:  - Vitals - T99.8 POA, increased to 100.8. HR 92, /57, RR 21, satting % RA.   - Labs - WBC 14.2, Hgb 14.1, Na 139, K 3.0, Cr 1.29 (BL 0.85). Lactic acid 1.6.   - Imaging - EKG 81, CXR (at PCP) showed bilateral infiltrates  - Treatment - collected blood Cx, gave levaquin 750mg IV x 1, K repletion w 40 mEq, NS bolus 1L x 2, tylenol 975, and duonebs x 1. \"    Hospital course    Community-Acquired Pneumonia Desatted to 88% on RA while at PCP office. POA WBC 14.2, Tmax 100.8, satting % on RA since arrival. CXR 9/21 showed bilateral infiltrates. - Abx: initially on Levaquin, but switched to doxycycline given QTc prolongation(478) seen on POA EKG  - Pt spiked a fever of 101.8 on night of admission, but remained afebrile after trt  - Blood Cx: NG x 2 days  - POA WBC 10.8 --> 4.6 at d/c, leukocytosis resolved  - Tylenol PRN for fever - afebrile for over 24hours before d/c  - Pt discharged on PO Doxycycline 100mg BID for 8.5 more days (total of 10 days)     Nausea - started overnight, likely a side effect from abx given yesterday on an empty stomach, pt with minimal PO intake the past few days and overnight  -Encouraged PO intake, PRN compazine   -Resolved before d/c      Risk for TIFFANIE (RIFLE criteria) - RESOLVED. No CKD, likely 2/2 decreased PO intake the past few days  - IVF NS @ 110cc/hr, encouraged PO intake. - POA Cr 1.29 (baseline 0.85) --> Cr 0.6 on morning of d/c      Hypokalemia - POA K 3.0, patient on potassium tablets outpatient to take with HCTZ. Pt did not take on day of admission and also received duonebs x 2, albuterol, which might have affected potassium level. - K replete while inpatient. Daily BMP. Resolved by discharge      Hypertension - Pt normotensive POA, 116-123/57-58. Did not take BP meds on day of admission.   - Held home medications of amlodipine 5, HCTZ 25mg.  BP was hypotensive-normotensive while inpatient. - Upon discharge, restarted half dose of HCTZ only, and held other antihypertensives(due to low-normal BP inpatient without meds). Pt instructed to follow-up with PCP for adequate management of HTN.     YUDY - stable. - Continued home effexor 37.5mg daily, to start today      Physical exam at discharge:    Vitals Reviewed. Stable   General Oriented to person, place, and time and well-developed. Appears well-nourished, no distress. Not diaphoretic. HENT Head Normocephalic and atraumatic. Eyes Conjunctivae are normal, no discharge. No scleral icterus. Nose Nose normal, clear turbinates. Oral Oropharynx is clear and moist. No oropharyngeal exudate. Neck No thyromegaly present. No cervical adenopathy. Cardio Normal rate, regular rhythm. Exam reveals no gallop and no friction rub. No murmur heard. No chest wall tenderness. Pulmonary Generalized wheezing upon auscultation. Effort normal. No respiratory distress on room air. Abdominal Soft. Bowel sounds normal. No distension. No tenderness.  Deferred. Extremities No edema of lower extremities. No tenderness. Distal pulses intact. Neurological Alert and oriented to person, place, and time. Dermatology Skin is warm and dry. No rash noted. No erythema or pallor. Psychiatric Affect and judgment normal.        Condition at discharge: Stable. Labs  Recent Labs      09/23/17   0234  09/22/17   0429  09/21/17   1703   WBC  4.6  10.8  14.2*   HGB  12.0  12.6  14.1   HCT  35.5  36.6  41.4   PLT  255  282  293     Recent Labs      09/23/17   0234  09/22/17   0429  09/21/17   1703   NA  142  140  139   K  3.8  3.3*  3.0*   CL  106  103  96*   CO2  26  26  33*   BUN  8  11  18   CREA  0.61  0.75  1.29*   GLU  113*  142*  107*   CA  8.5  8.2*  8.9     No results for input(s): SGOT, GPT, ALT, AP, TBIL, TBILI, TP, ALB, GLOB, GGT, AML, LPSE in the last 72 hours.     No lab exists for component: AMYP, HLPSE  No results for input(s): PH, PCO2, PO2, TNIPOC, TROIQ, INR, PTP, APTT, FE, TIBC, PSAT, FERR, GLUCPOC in the last 72 hours. No lab exists for component: GLPOC, INREXT, INREXT  Cultures  · Blood: NGTD    Procedures / Diagnostic Studies  · None    Imaging    Results from Appointment encounter on 09/21/17   XR CHEST PA LAT   Narrative Exam:  2 view chest    Indication: Cough and wheezing    Comparison to 1/13/2017. PA and lateral views demonstrate normal heart size. Right perihilar and left  upper lobe infiltrates are noted. The osseous structures are unremarkable. Impression Impression: Bilateral pulmonary infiltrates. No results found for this or any previous visit. No results found for this or any previous visit. No procedure found.     Chronic diagnoses   Problem List as of 9/23/2017  Date Reviewed: 9/22/2017          Codes Class Noted - Resolved    CAP (community acquired pneumonia) ICD-10-CM: J18.9  ICD-9-CM: 597  9/22/2017 - Present        Pneumonia ICD-10-CM: J18.9  ICD-9-CM: 144  9/21/2017 - Present        Osteopenia of spine ICD-10-CM: M85.88  ICD-9-CM: 733.90  10/3/2016 - Present    Overview Addendum 10/3/2016 11:03 AM by Sukhdeep Ozuna MD     Vit D 50 in 10/15  DEXA 11/3/15             Preventative health care ICD-10-CM: Z00.00  ICD-9-CM: V70.0  10/3/2016 - Present    Overview Addendum 10/3/2016 11:13 AM by Sukhdeep Ozuna MD     Clinical breast exam: normal 8/14  Mammogram: normal 6/16  Pap smear:  2014  Lipid panel:  Mildly elevated 10/15  Cardiac:  No EKG on file  Bone density:  Osteopenia spine 11/15  Colonoscopy:  1/2013 (normal? Don't have record)  HIV:  Hep C:  Immunizations: zostavax 2012, TDAP 2015             Prediabetes ICD-10-CM: R73.03  ICD-9-CM: 790.29  10/3/2016 - Present    Overview Signed 10/3/2016 10:37 AM by Sukhdeep Ozuna MD     a1c 5.9 in 9/14             Cyst of left ovary ICD-10-CM: R83.329  ICD-9-CM: 620.2  10/3/2016 - Present    Overview Signed 10/3/2016 11:10 AM by Sandrine Og MD     4cm on MRI in 7/15             Nephrolithiasis ICD-10-CM: N20.0  ICD-9-CM: 592.0  10/3/2016 - Present        Bilateral renal cysts ICD-10-CM: N28.1  ICD-9-CM: 753.10  10/3/2016 - Present    Overview Signed 10/3/2016 11:11 AM by Sandrine Og MD     Benign-appearing on US 2015             Hepatic cyst ICD-10-CM: K76.89  ICD-9-CM: 573.8  10/3/2016 - Present    Overview Signed 10/3/2016 11:11 AM by Sandrine Og MD     Multiple, on US 6/15             Essential hypertension with goal blood pressure less than 140/90 ICD-10-CM: I10  ICD-9-CM: 401.9  9/13/2016 - Present        Family history of colon cancer ICD-10-CM: Z80.0  ICD-9-CM: V16.0  9/13/2016 - Present    Overview Signed 9/13/2016 10:31 AM by Sandrine Og MD     Sister in her 46s, daughter in her 35s, genetic testing in multiple family members negative             Overweight (BMI 25.0-29. 9) ICD-10-CM: E66.3  ICD-9-CM: 278.02  9/13/2016 - Present        Chronic midline low back pain without sciatica ICD-10-CM: M54.5, G89.29  ICD-9-CM: 724.2, 338.29  9/13/2016 - Present        Reflux pharyngitis ICD-10-CM: J02.9  ICD-9-CM: 462  9/13/2016 - Present    Overview Signed 10/3/2016 10:35 AM by Sandrine Og MD     H pylori antibodies neg in 6/15             YUDY (generalized anxiety disorder) ICD-10-CM: F41.1  ICD-9-CM: 300.02  9/13/2016 - Present        History of basal cell carcinoma ICD-10-CM: Z85.828  ICD-9-CM: V10.83  9/13/2016 - Present        Hypokalemia ICD-10-CM: E87.6  ICD-9-CM: 276.8  9/13/2016 - Present        RESOLVED: History of anxiety ICD-10-CM: Z86.59  ICD-9-CM: V11.8  9/13/2016 - 9/13/2016              Discharge/Transfer Medications  Discharge Medication List as of 9/23/2017  2:05 PM      CONTINUE these medications which have CHANGED    Details   hydroCHLOROthiazide (HYDRODIURIL) 25 mg tablet Take 0.5 Tabs by mouth daily. , Print, Disp-30 Tab, R-0      doxycycline (VIBRAMYCIN) 100 mg capsule Take 1 Cap by mouth two (2) times a day for 9 days. , Print, Disp-17 Cap, R-0         CONTINUE these medications which have NOT CHANGED    Details   diclofenac EC (VOLTAREN) 75 mg EC tablet Take 75 mg by mouth two (2) times a day., Historical Med      multivitamin (ONE A DAY) tablet Take 1 Tab by mouth daily. , Historical Med      chlorpheniramine-dm (CORICIDIN HBP COUGH AND COLD) 4-30 mg tab Take 1 Tab by mouth every six (6) hours as needed (cough and cold symptoms). , Historical Med      potassium chloride (KLOR-CON) 10 mEq tablet TAKE 1 CAPSULE BY MOUTH TWICE DAILY, Normal, Disp-180 Tab, R-0      venlafaxine-SR (EFFEXOR-XR) 37.5 mg capsule TAKE 1 CAPSULE BY MOUTH EVERY DAY, Normal, Disp-30 Cap, R-0      losartan (COZAAR) 25 mg tablet Take 1 Tab by mouth daily. , Normal, Disp-90 Tab, R-3      amLODIPine (NORVASC) 5 mg tablet Take 1 Tab by mouth daily. , Normal, Disp-90 Tab, R-3         STOP taking these medications       albuterol-ipratropium (DUO-NEB) 2.5 mg-0.5 mg/3 ml nebu Comments:   Reason for Stopping:         cefTRIAXone (ROCEPHIN) 1 gram injection Comments:   Reason for Stopping:                Diet:  Regular diet.     Activity:  As tolerated    Disposition: Home    Discharge instructions to patient/family  Please seek medical attention for any new or worsening symptoms particularly fever, chest pain, shortness of breath, abdominal pain, nausea, vomiting    Follow up plans/appointments  Follow-up Information     Follow up With Details Comments Axel Montague MD Go on 9/27/2017 Follow up, appointment is at: 10:45pm  [address] [facility] 16 [phone]      [address]  [phone]             Shannon Serna MD  Family Medicine Resident       For Billing    Chief Complaint   Patient presents with    Chest Congestion   [facility] Cough       Hospital Problems  Date Reviewed: 9/22/2017          Codes Class Noted POA CAP (community acquired pneumonia) ICD-10-CM: J18.9  ICD-9-CM: 486  9/22/2017 Unknown        Pneumonia ICD-10-CM: J18.9  ICD-9-CM: 158  9/21/2017 Unknown

## 2017-09-25 ENCOUNTER — PATIENT OUTREACH (OUTPATIENT)
Dept: FAMILY MEDICINE CLINIC | Age: 62
End: 2017-09-25

## 2017-09-25 NOTE — PROGRESS NOTES
NNTOCIP (Transitions of Care Inpatient)    Hospital Discharge Follow-Up      Date/Time:     2017 11:27 AM    Patient listed on discharge [facility]) report on 17. Patient discharged from [address] [place] [address] for Pneumonia. RRAT score: Medium Risk            13       Total Score        3 Has Seen PCP in Last 6 Months (Yes=3, No=0)    2 . Living with Significant Other. Assisted Living. LTAC. SNF. or   Rehab    8 Charlson Comorbidity Score (Age + Comorbid Conditions)        Criteria that do not apply:    Patient Length of Stay (>5 days = 3)    IP Visits Last 12 Months (1-3=4, 4=9, >4=11)    Pt. Coverage (Medicare=5 , Medicaid, or Self-Pay=4)          Medical History:     Past Medical History:   Diagnosis Date    Hypertension        Nurse Navigator(NN) contacted the patient by telephone to perform post hospital discharge assessment. Verified  and address with patient as identifiers. Provided introduction to self, and explanation of the Nurses Navigator role. Patient states that she is doing well and she is taking all of her medications including her antibiotics as prescribed. The patient also states that she is monitoring her BP and checking for low readings. Diet:   Patient reports: Regular Diet    Activity:    Patient reports: mostly moving around the house, light house work and somewalking outside the house    Medication:   Performed medication reconciliation with patient, and patient verbalizes understanding of administration of home medications. There were no barriers to obtaining medications identified at this time. Support system:  Patient and spouse    Discharge Instructions :  Reviewed discharge instructions with patient. Patient verbalizes understanding of discharge instructions and follow-up care.        Red Flags:  Seek medical attention for any new or worsening symptoms particularly fever, chest pain, shortness of breath, abdominal pain, nausea, vomiting    Labs Reviewed:  Recent Labs      09/23/17   0234   WBC  4.6   HGB  12.0   HCT  35.5   PLT  255     Recent Labs      09/23/17   0234   NA  142   K  3.8   CL  106   CO2  26   GLU  113*   BUN  8   CREA  0.61   CA  8.5       PCP/Specialist follow up: Patient scheduled to follow up with Dr. Mirtha Nieves on 9/26/17 at 10:45 am.  Reviewed red flags with patient, and patient verbalizes understanding. Patient given an opportunity to ask questions. No other clinical/social/functional needs noted. The patient agrees to contact the PCP office for questions related to their healthcare. The patient expressed thanks, offered no additional questions and ended the call.

## 2017-09-26 ENCOUNTER — OFFICE VISIT (OUTPATIENT)
Dept: FAMILY MEDICINE CLINIC | Age: 62
End: 2017-09-26

## 2017-09-26 VITALS
HEIGHT: 64 IN | OXYGEN SATURATION: 97 % | RESPIRATION RATE: 16 BRPM | SYSTOLIC BLOOD PRESSURE: 114 MMHG | BODY MASS INDEX: 27.9 KG/M2 | WEIGHT: 163.4 LBS | HEART RATE: 75 BPM | DIASTOLIC BLOOD PRESSURE: 76 MMHG | TEMPERATURE: 98.2 F

## 2017-09-26 DIAGNOSIS — J18.9 PNEUMONIA, COMMUNITY ACQUIRED: Primary | ICD-10-CM

## 2017-09-26 LAB
BACTERIA SPEC CULT: NORMAL
SERVICE CMNT-IMP: NORMAL

## 2017-09-26 RX ORDER — AMLODIPINE BESYLATE 5 MG/1
5 TABLET ORAL 2 TIMES DAILY
COMMUNITY
Start: 2017-07-03 | End: 2017-09-26 | Stop reason: SDUPTHER

## 2017-09-26 NOTE — PROGRESS NOTES
Chief Complaint   Patient presents with   [facility] Follow Up     hospitalized 2 days for pneumonia

## 2017-09-26 NOTE — PROGRESS NOTES
Jason Granda is a 60 y.o. female who presents for f/u of CAP. She was seen in clinic 9/19 for URI sx. Sx worsened and I saw her in clinic on 9/19/17. CXR demonstrated bilateral PNA. Abx started in clinic (ceftriaxone) and blood cx sent prior to her going to the ER. She was switched to levofloxacin. She stayed in the hospital for 2 nights, did well, and was discharged with doxycycline. She is currently taking doxycycline. She is feeling much better. No fever. No cp/sob. Her energy level has improved. Blood cx from ER negative for 5 days and blood cx from clinic negative for 4 days. HTN: Medication held during admission b/c of normal or low BP. She was discharged with HCTZ 12.5 mg. Home medication prior to admission was HCTZ 25mg daily and Amlodipine 5mg daily. She has resumed her regular home medications for HCTZ and Amlodipine as her BP reading were starting to elevate 2 days ago. BP returned to normal after resuming her home meds. No cp/sob. No neurological deficits. PMHx:  Past Medical History:   Diagnosis Date    Hypertension     Pneumonia 09/21/2017       Meds:   Current Outpatient Prescriptions   Medication Sig Dispense Refill    hydroCHLOROthiazide (HYDRODIURIL) 25 mg tablet Take 0.5 Tabs by mouth daily. 30 Tab 0    doxycycline (VIBRAMYCIN) 100 mg capsule Take 1 Cap by mouth two (2) times a day for 9 days. 17 Cap 0    diclofenac EC (VOLTAREN) 75 mg EC tablet Take 75 mg by mouth two (2) times a day.  multivitamin (ONE A DAY) tablet Take 1 Tab by mouth daily.  potassium chloride (KLOR-CON) 10 mEq tablet TAKE 1 CAPSULE BY MOUTH TWICE DAILY 180 Tab 0    venlafaxine-SR (EFFEXOR-XR) 37.5 mg capsule TAKE 1 CAPSULE BY MOUTH EVERY DAY 30 Cap 0    amLODIPine (NORVASC) 5 mg tablet Take 1 Tab by mouth daily. 90 Tab 3    amLODIPine (NORVASC) 5 mg tablet Take 5 mg by mouth two (2) times a day.       chlorpheniramine-dm (CORICIDIN HBP COUGH AND COLD) 4-30 mg tab Take 1 Tab by mouth every six (6) hours as needed (cough and cold symptoms).  losartan (COZAAR) 25 mg tablet Take 1 Tab by mouth daily. 90 Tab 3       Allergies: Allergies   Allergen Reactions    Erythromycin Nausea Only       Smoker:  History   Smoking Status    Never Smoker   Smokeless Tobacco    Never Used       ETOH:   History   Alcohol Use    0.6 oz/week    1 Glasses of wine per week     Comment: rarely       FH:   Family History   Problem Relation Age of Onset    Breast Cancer Mother 59    Hypertension Father     Cancer Sister     Colon Cancer Sister     Psychiatric Disorder Brother      schizophrenia    Cancer Other     Colon Cancer Other        ROS:  Per HPI    Physical Exam:  Visit Vitals    /76 (BP 1 Location: Left arm, BP Patient Position: Sitting)    Pulse 75    Temp 98.2 °F (36.8 °C) (Oral)    Resp 16    Ht 5' 4\" (1.626 m)    Wt 163 lb 6.4 oz (74.1 kg)    SpO2 97%    BMI 28.05 kg/m2     GEN: No apparent distress. Alert and oriented and responds to all questions appropriately. EYES:  Conjunctiva clear;   LUNGS: Respirations unlabored; clear to auscultation bilaterally  CARDIOVASCULAR: Regular, rate, and rhythm without murmurs, gallops or rubs   NEUROLOGIC:  No focal neurologic deficits. EXT: Well perfused. No edema. SKIN: No obvious rashes. Assessment:    ICD-10-CM ICD-9-CM    1. Pneumonia, community acquired J18.9 486    Feeling better and PNA clinically resolving. 2. HTN: Currently controlled. Continue current medications. Plan:  Continue Doxycycline to complete entire course of antibiotic.   RTC: 1 week to f/u on PNA and HTN

## 2017-09-27 LAB
BACTERIA BLD CULT: NORMAL
BACTERIA BLD CULT: NORMAL

## 2017-10-03 ENCOUNTER — OFFICE VISIT (OUTPATIENT)
Dept: FAMILY MEDICINE CLINIC | Age: 62
End: 2017-10-03

## 2017-10-03 VITALS
RESPIRATION RATE: 16 BRPM | HEART RATE: 83 BPM | SYSTOLIC BLOOD PRESSURE: 111 MMHG | WEIGHT: 163 LBS | OXYGEN SATURATION: 91 % | TEMPERATURE: 98.5 F | DIASTOLIC BLOOD PRESSURE: 76 MMHG | BODY MASS INDEX: 27.83 KG/M2 | HEIGHT: 64 IN

## 2017-10-03 DIAGNOSIS — J18.9 COMMUNITY ACQUIRED PNEUMONIA, UNSPECIFIED LATERALITY: ICD-10-CM

## 2017-10-03 DIAGNOSIS — I10 ESSENTIAL HYPERTENSION: Primary | ICD-10-CM

## 2017-10-03 RX ORDER — LOSARTAN POTASSIUM 25 MG/1
25 TABLET ORAL
COMMUNITY
End: 2017-10-12 | Stop reason: SDUPTHER

## 2017-10-03 RX ORDER — DOXYCYCLINE 100 MG/1
100 CAPSULE ORAL 2 TIMES DAILY
Qty: 8 CAP | Refills: 0 | Status: SHIPPED | OUTPATIENT
Start: 2017-10-03 | End: 2017-10-07

## 2017-10-03 NOTE — PROGRESS NOTES
Alondra Villalpando is a 60 y.o. female who presents for f/u PNA and HTN. She has finished Abx and feeling better. No cp/sob. Mild CASIANO. Coughing has improved. Taking Norvasc, losartan and HCTZ and tolerating well. No cp/sob. No neurological deficits. PMHx:  Past Medical History:   Diagnosis Date    Hypertension     Pneumonia 09/21/2017       Meds:   Current Outpatient Prescriptions   Medication Sig Dispense Refill    hydroCHLOROthiazide (HYDRODIURIL) 25 mg tablet Take 0.5 Tabs by mouth daily. 30 Tab 0    diclofenac EC (VOLTAREN) 75 mg EC tablet Take 75 mg by mouth two (2) times a day.  multivitamin (ONE A DAY) tablet Take 1 Tab by mouth daily.  potassium chloride (KLOR-CON) 10 mEq tablet TAKE 1 CAPSULE BY MOUTH TWICE DAILY 180 Tab 0    venlafaxine-SR (EFFEXOR-XR) 37.5 mg capsule TAKE 1 CAPSULE BY MOUTH EVERY DAY 30 Cap 0    amLODIPine (NORVASC) 5 mg tablet Take 1 Tab by mouth daily. 90 Tab 3   Losartan 25mg daily    Allergies:    Allergies   Allergen Reactions    Erythromycin Nausea Only       Smoker:  History   Smoking Status    Never Smoker   Smokeless Tobacco    Never Used       ETOH:   History   Alcohol Use    0.6 oz/week    1 Glasses of wine per week     Comment: rarely       FH:   Family History   Problem Relation Age of Onset    Breast Cancer Mother 59    Hypertension Father     Cancer Sister     Colon Cancer Sister     Psychiatric Disorder Brother      schizophrenia    Cancer Other     Colon Cancer Other        ROS:  General/Constitutional:   No headache, fever, fatigue, weight loss or weight gain       Cardiac:    No chest pain      Respiratory:   Per HPI   GI:   No nausea/vomiting, diarrhea, abdominal pain,  Skin: No rash     Physical Exam:  Visit Vitals    /76 (BP 1 Location: Left arm, BP Patient Position: Sitting)    Pulse 83    Temp 98.5 °F (36.9 °C) (Oral)    Resp 16    Ht 5' 4\" (1.626 m)    Wt 163 lb (73.9 kg)    SpO2 91%    BMI 27.98 kg/m2     GEN: No apparent distress. Alert and oriented and responds to all questions appropriately. EYES:  Conjunctiva clear;      LUNGS: Respirations unlabored; Mild rhonchi on the right base, otherwise clear to auscultation   CARDIOVASCULAR: Regular, rate, and rhythm without murmurs, gallops or rubs   EXT: Well perfused. No edema. Moving all ext equally. SKIN: No obvious rashes. Imaging: CXR personally reviewed and demonstrates persistent Right Perihilar infiltrate but improved. Assessment:    ICD-10-CM ICD-9-CM    1. Essential hypertension I10 401.9    2. Community acquired pneumonia, unspecified laterality J18.9 5        Plan:  HTN: Well controlled. Continue Losartan, HCTZ and Norvasc  CAP: Improving but still with mild infiltrate on RLL. Will extend Doxycycline 100mg BD for an additional 4 days (total of 14 days of oral abx). Discussed that she could have some mild CASIANO over the next several weeks as she continues to recover from the PNA. This should slowly improve. She RTC immediately if any sx return or go to ER.

## 2017-10-08 RX ORDER — LOSARTAN POTASSIUM 25 MG/1
TABLET ORAL
Qty: 90 TAB | Refills: 0 | Status: SHIPPED | OUTPATIENT
Start: 2017-10-08 | End: 2017-10-12 | Stop reason: SDUPTHER

## 2017-10-08 RX ORDER — AMLODIPINE BESYLATE 5 MG/1
TABLET ORAL
Qty: 90 TAB | Refills: 0 | Status: SHIPPED | OUTPATIENT
Start: 2017-10-08 | End: 2017-10-12 | Stop reason: SDUPTHER

## 2017-10-12 ENCOUNTER — OFFICE VISIT (OUTPATIENT)
Dept: FAMILY MEDICINE CLINIC | Age: 62
End: 2017-10-12

## 2017-10-12 VITALS
BODY MASS INDEX: 28.34 KG/M2 | DIASTOLIC BLOOD PRESSURE: 81 MMHG | HEART RATE: 86 BPM | OXYGEN SATURATION: 98 % | TEMPERATURE: 98.3 F | SYSTOLIC BLOOD PRESSURE: 137 MMHG | WEIGHT: 166 LBS | HEIGHT: 64 IN | RESPIRATION RATE: 18 BRPM

## 2017-10-12 DIAGNOSIS — J18.9 COMMUNITY ACQUIRED PNEUMONIA, UNSPECIFIED LATERALITY: Primary | ICD-10-CM

## 2017-10-12 DIAGNOSIS — Z23 ENCOUNTER FOR IMMUNIZATION: ICD-10-CM

## 2017-10-12 RX ORDER — HYDROCHLOROTHIAZIDE 25 MG/1
12.5 TABLET ORAL DAILY
Qty: 90 TAB | Refills: 1 | Status: SHIPPED | OUTPATIENT
Start: 2017-10-12 | End: 2017-12-11 | Stop reason: SDUPTHER

## 2017-10-12 RX ORDER — POTASSIUM CHLORIDE 750 MG/1
TABLET, EXTENDED RELEASE ORAL
Qty: 180 TAB | Refills: 3 | Status: SHIPPED | OUTPATIENT
Start: 2017-10-12 | End: 2018-10-16 | Stop reason: SDUPTHER

## 2017-10-12 RX ORDER — DICLOFENAC SODIUM 75 MG/1
75 TABLET, DELAYED RELEASE ORAL
Qty: 60 TAB | Refills: 2 | Status: SHIPPED | OUTPATIENT
Start: 2017-10-12 | End: 2018-01-16 | Stop reason: SDUPTHER

## 2017-10-12 RX ORDER — AMLODIPINE BESYLATE 5 MG/1
TABLET ORAL
Qty: 90 TAB | Refills: 3 | Status: SHIPPED | OUTPATIENT
Start: 2017-10-12 | End: 2018-10-16 | Stop reason: SDUPTHER

## 2017-10-12 RX ORDER — LOSARTAN POTASSIUM 25 MG/1
TABLET ORAL
Qty: 90 TAB | Refills: 3 | Status: SHIPPED | OUTPATIENT
Start: 2017-10-12 | End: 2017-12-11

## 2017-10-12 RX ORDER — VENLAFAXINE HYDROCHLORIDE 37.5 MG/1
CAPSULE, EXTENDED RELEASE ORAL
Qty: 90 CAP | Refills: 3 | Status: SHIPPED | OUTPATIENT
Start: 2017-10-12 | End: 2018-10-16 | Stop reason: SDUPTHER

## 2017-10-12 NOTE — PROGRESS NOTES
Cassandra Odonnell is a 60 y.o. female who presents for f/u PNA. Feeling back to normal.  Off antibiotics for 4 days. No fever. Cough has resolved. Stills has mild CASIANO but improving. She did have an episode of wheezing when she visited her mother-in-law. They then found mold in her mother-in-laws house. Within 1-2 hours of leaving the house her sx resolved and did not return. She is also requesting refills of her chronic medications. PMHx:  Past Medical History:   Diagnosis Date    Hypertension     Pneumonia 09/21/2017       Meds:   Current Outpatient Prescriptions   Medication Sig Dispense Refill    amLODIPine (NORVASC) 5 mg tablet TAKE 1 TABLET BY MOUTH DAILY 90 Tab 0    venlafaxine-SR (EFFEXOR-XR) 37.5 mg capsule TAKE 1 CAPSULE BY MOUTH EVERY DAY 30 Cap 0    losartan (COZAAR) 25 mg tablet TAKE 1 TABLET BY MOUTH DAILY 90 Tab 0    losartan (COZAAR) 25 mg tablet Take 25 mg by mouth.  hydroCHLOROthiazide (HYDRODIURIL) 25 mg tablet Take 0.5 Tabs by mouth daily. 30 Tab 0    diclofenac EC (VOLTAREN) 75 mg EC tablet Take 75 mg by mouth two (2) times a day.  multivitamin (ONE A DAY) tablet Take 1 Tab by mouth daily.  potassium chloride (KLOR-CON) 10 mEq tablet TAKE 1 CAPSULE BY MOUTH TWICE DAILY 180 Tab 0       Allergies:    Allergies   Allergen Reactions    Erythromycin Nausea Only       Smoker:  History   Smoking Status    Never Smoker   Smokeless Tobacco    Never Used       ETOH:   History   Alcohol Use    0.6 oz/week    1 Glasses of wine per week     Comment: rarely       FH:   Family History   Problem Relation Age of Onset    Breast Cancer Mother 59    Hypertension Father     Cancer Sister     Colon Cancer Sister     Psychiatric Disorder Brother      schizophrenia    Cancer Other     Colon Cancer Other        ROS:  General/Constitutional:   No headache, fever, fatigue, weight loss or weight gain       Respiratory:   No cough or shortness of breath     GI:   No nausea/vomiting, diarrhea,  Skin: No rash     Physical Exam:  Visit Vitals    /81 (BP 1 Location: Left arm, BP Patient Position: Sitting)    Pulse 86    Temp 98.3 °F (36.8 °C) (Oral)    Resp 18    Ht 5' 4\" (1.626 m)    Wt 166 lb (75.3 kg)    SpO2 98%    BMI 28.49 kg/m2     GEN: No apparent distress. Alert and oriented and responds to all questions appropriately. EYES:  Conjunctiva clear;   LUNGS: Respirations unlabored; clear to auscultation bilaterally  CARDIOVASCULAR: Regular, rate, and rhythm without murmurs, gallops or rubs   EXT: Moving all equally. SKIN: No obvious rashes. Assessment:    ICD-10-CM ICD-9-CM    1. Community acquired pneumonia, unspecified laterality J18.9 5    PNA has resolved clinically and no return of sx off abx. Plan:  Monitor for any return of sx. Flu vaccination given  Chronic medications refilled. RTC: 6 months for f/u HTN.

## 2017-10-19 ENCOUNTER — OFFICE VISIT (OUTPATIENT)
Dept: FAMILY MEDICINE CLINIC | Age: 62
End: 2017-10-19

## 2017-10-19 VITALS
BODY MASS INDEX: 28.68 KG/M2 | HEART RATE: 86 BPM | TEMPERATURE: 98.5 F | WEIGHT: 168 LBS | RESPIRATION RATE: 18 BRPM | OXYGEN SATURATION: 99 % | HEIGHT: 64 IN | SYSTOLIC BLOOD PRESSURE: 128 MMHG | DIASTOLIC BLOOD PRESSURE: 76 MMHG

## 2017-10-19 DIAGNOSIS — R05.9 COUGH: Primary | ICD-10-CM

## 2017-10-19 DIAGNOSIS — R06.2 WHEEZING: ICD-10-CM

## 2017-10-19 RX ORDER — BENZONATATE 100 MG/1
100 CAPSULE ORAL
Qty: 20 CAP | Refills: 0 | Status: SHIPPED | OUTPATIENT
Start: 2017-10-19 | End: 2017-10-26

## 2017-10-19 RX ORDER — CODEINE PHOSPHATE AND GUAIFENESIN 10; 100 MG/5ML; MG/5ML
5 SOLUTION ORAL
Qty: 118 ML | Refills: 0 | Status: SHIPPED | OUTPATIENT
Start: 2017-10-19 | End: 2019-01-08 | Stop reason: ALTCHOICE

## 2017-10-19 RX ORDER — ALBUTEROL SULFATE 90 UG/1
1 AEROSOL, METERED RESPIRATORY (INHALATION)
Qty: 1 INHALER | Refills: 0 | Status: SHIPPED | OUTPATIENT
Start: 2017-10-19 | End: 2021-11-11 | Stop reason: ALTCHOICE

## 2017-10-19 NOTE — PROGRESS NOTES
Chief Complaint   Patient presents with    Cough     X 1 week,wheezing     1. Have you been to the ER, urgent care clinic since your last visit? Hospitalized since your last visit? No    2. Have you seen or consulted any other health care providers outside of the [address] [place] [address] since your last visit? Include any pap smears or colon screening.  No

## 2017-10-19 NOTE — PROGRESS NOTES
Subjective:      Rosendo John is a 60 y.o. female who presents for evaluation of possible respiratory infection. Recently hospitalized for PNA. Abx extended by Dr. Duglas Stark. Then seemed to have resolved. Sx restarted the day after she saw Dr Duglas Stark last.  Wheezing, coughing, no fever that she knows of but has night sweats. Left ear feels stopped up. Coughing worse now than when she had pneumonia. Not currently on medications. No new sick contacts. Allergies- reviewed: Allergies   Allergen Reactions    Erythromycin Nausea Only         Medications- reviewed:   Current Outpatient Prescriptions   Medication Sig    benzonatate (TESSALON) 100 mg capsule Take 1 Cap by mouth three (3) times daily as needed for Cough for up to 7 days.  guaiFENesin-codeine (ROBITUSSIN AC) 100-10 mg/5 mL solution Take 5 mL by mouth three (3) times daily as needed for Cough. Max Daily Amount: 15 mL.  albuterol (PROVENTIL HFA, VENTOLIN HFA, PROAIR HFA) 90 mcg/actuation inhaler Take 1 Puff by inhalation every four (4) hours as needed for Wheezing.  amLODIPine (NORVASC) 5 mg tablet TAKE 1 TABLET BY MOUTH DAILY    venlafaxine-SR (EFFEXOR-XR) 37.5 mg capsule TAKE 1 CAPSULE BY MOUTH EVERY DAY    losartan (COZAAR) 25 mg tablet TAKE 1 TABLET BY MOUTH DAILY    hydroCHLOROthiazide (HYDRODIURIL) 25 mg tablet Take 0.5 Tabs by mouth daily.  potassium chloride (KLOR-CON) 10 mEq tablet TAKE 1 CAPSULE BY MOUTH TWICE DAILY    diclofenac EC (VOLTAREN) 75 mg EC tablet Take 1 Tab by mouth two (2) times daily as needed.  multivitamin (ONE A DAY) tablet Take 1 Tab by mouth daily. No current facility-administered medications for this visit.           Past Medical History- reviewed:  Past Medical History:   Diagnosis Date    Hypertension     Pneumonia 2017         Past Surgical History- reviewed:   Past Surgical History:   Procedure Laterality Date    HX  SECTION           Social History- reviewed:  Social History     Social History    Marital status:      Spouse name: N/A    Number of children: N/A    Years of education: N/A     Occupational History    Not on file. Social History Main Topics    Smoking status: Never Smoker    Smokeless tobacco: Never Used    Alcohol use 0.6 oz/week     1 Glasses of wine per week      Comment: rarely    Drug use: No    Sexual activity: No     Other Topics Concern    Not on file     Social History Narrative    Lives with     Daughter Teresa Jimenes  2014 from colon cancer soon after having his son    Works at home but still works in [employer] 2 days per week,          Immunizations- reviewed:   Immunization History   Administered Date(s) Administered    Influenza Vaccine 10/27/2014, 10/06/2015    Influenza Vaccine (Quad) PF 10/12/2017    Tdap 10/06/2015    Zoster Vaccine, Live 2012       Review of Systems  General: No fevers or chills  HEENT: No headaches, ear discharge, mild sore throat  Respiratory: Yes cough        Objective:     Visit Vitals    /76 (BP 1 Location: Right arm, BP Patient Position: Sitting)    Pulse 86    Temp 98.5 °F (36.9 °C) (Oral)    Resp 18    Ht 5' 4\" (1.626 m)    Wt 168 lb (76.2 kg)    SpO2 99%    BMI 28.84 kg/m2       General: Alert and oriented and in no acute distress. Responds to all questions                   appropriately. SKIN: No rash. HEAD: Normocephalic, atraumatic, PERRL  EYES: Sclera and conjunctiva clear; pupils round and reactive to light. EARS: External normal, canals clear, tympanic membranes normal.     NOSE: Nasal mucosa normal          OROPHARYNX: No tonsillar erythema or exudates  NECK: Supple; no masses; no lymphadenopathy. LUNGS: R lung fields with diffuse intermittent wheezes, L upper lobe with occasional wheeze, good aeration, no rales or rhonchi  CARDIOVASCULAR: Regular rate and rhythm without murmurs, gallops or rubs.   NEUROLOGIC: Speech intact; face symmetrical; moves all extremities equally. Assessment/Plan:       ICD-10-CM ICD-9-CM    1. Cough R05 786.2 XR CHEST PA LAT   2. Wheezing R06.2 786.07      · XR repeated today give reemergence of sx, but shows complete resolution of PNA  · Suspect this is now viral.  Reassured that she is afebrile and sats are normal.    · Recommend supportive care. Will try prescription cough medication and an inhaler for the wheezing. · Advised that codeine cough syrup can cause drowsiness. Best to take at night. Do not drive while taking it. · RTC for persistent or worsening sx. No orders of the defined types were placed in this encounter. I have discussed the diagnosis with the patient and the intended plan as seen in the above orders. The patient has received an after-visit summary and questions were answered concerning future plans. I have discussed medication side effects and warnings with the patient as well.       Francis Wood, DO

## 2017-11-10 RX ORDER — HYDROCHLOROTHIAZIDE 25 MG/1
12.5 TABLET ORAL DAILY
Qty: 90 TAB | Refills: 1 | Status: CANCELLED | OUTPATIENT
Start: 2017-11-10

## 2017-11-10 RX ORDER — HYDROCHLOROTHIAZIDE 12.5 MG/1
12.5 TABLET ORAL DAILY
Qty: 90 TAB | Refills: 0 | Status: SHIPPED | OUTPATIENT
Start: 2017-11-10 | End: 2017-12-11

## 2017-11-10 NOTE — TELEPHONE ENCOUNTER
Patient calling needs this one sent over but needs it increased to 1 whole tab a day and not 1 half tab a day, is requesting the quantity to be increased also, Please call patient with any further questions at [phone], thank you.

## 2017-12-11 ENCOUNTER — OFFICE VISIT (OUTPATIENT)
Dept: FAMILY MEDICINE CLINIC | Age: 62
End: 2017-12-11

## 2017-12-11 VITALS
BODY MASS INDEX: 28.85 KG/M2 | DIASTOLIC BLOOD PRESSURE: 82 MMHG | SYSTOLIC BLOOD PRESSURE: 129 MMHG | HEART RATE: 82 BPM | WEIGHT: 169 LBS | OXYGEN SATURATION: 98 % | RESPIRATION RATE: 16 BRPM | TEMPERATURE: 98.4 F | HEIGHT: 64 IN

## 2017-12-11 DIAGNOSIS — I10 ESSENTIAL HYPERTENSION: ICD-10-CM

## 2017-12-11 DIAGNOSIS — J45.40 MODERATE PERSISTENT ASTHMA, UNSPECIFIED WHETHER COMPLICATED: ICD-10-CM

## 2017-12-11 DIAGNOSIS — R06.83 SNORING: Primary | ICD-10-CM

## 2017-12-11 RX ORDER — HYDROCHLOROTHIAZIDE 25 MG/1
25 TABLET ORAL DAILY
Qty: 30 TAB | Refills: 11 | Status: SHIPPED | OUTPATIENT
Start: 2017-12-11 | End: 2019-01-03 | Stop reason: SDUPTHER

## 2017-12-11 NOTE — PROGRESS NOTES
Subjective:      Cesarrosalee Jama is a 60 y.o. female who presents for evaluation of possible respiratory infection. Symptoms started: A month ago, just wheezing at night time. Using albuterol every day. No cough. No sob. No fever. Had asthma as a child then was okay for awhile and more recently has had to use albuterol more often. Snoring:  says she stops snoring for periods of time. Weight gain. Fatigue. Sister with sleep apnea. HTN: HCTZ had been increased from 12.5 to 25mg daily but when it was refilled last time the lower dose was sent over again. So has been taking 12.5mg daily, as well as losartan and amlodipine. Pt states she would prefer to increase the HCTZ and get rid of losartan. Wants referral for PT for sciatica. Wants to go to specific physical therapist and she can't remember the name so will call Jadon Black back with the name. Allergies- reviewed: Allergies   Allergen Reactions    Erythromycin Nausea Only         Medications- reviewed:   Current Outpatient Prescriptions   Medication Sig    hydroCHLOROthiazide (HYDRODIURIL) 25 mg tablet Take 1 Tab by mouth daily.  budesonide (PULMICORT FLEXHALER) 90 mcg/actuation aepb inhaler Take 2 Puffs by inhalation two (2) times a day.  albuterol (PROVENTIL HFA, VENTOLIN HFA, PROAIR HFA) 90 mcg/actuation inhaler Take 1 Puff by inhalation every four (4) hours as needed for Wheezing.  amLODIPine (NORVASC) 5 mg tablet TAKE 1 TABLET BY MOUTH DAILY    venlafaxine-SR (EFFEXOR-XR) 37.5 mg capsule TAKE 1 CAPSULE BY MOUTH EVERY DAY    potassium chloride (KLOR-CON) 10 mEq tablet TAKE 1 CAPSULE BY MOUTH TWICE DAILY    diclofenac EC (VOLTAREN) 75 mg EC tablet Take 1 Tab by mouth two (2) times daily as needed.  multivitamin (ONE A DAY) tablet Take 1 Tab by mouth daily.  guaiFENesin-codeine (ROBITUSSIN AC) 100-10 mg/5 mL solution Take 5 mL by mouth three (3) times daily as needed for Cough. Max Daily Amount: 15 mL. (Patient not taking: Reported on 2017)     No current facility-administered medications for this visit. Past Medical History- reviewed:  Past Medical History:   Diagnosis Date    Asthma     Hypertension     Pneumonia 2017         Past Surgical History- reviewed:   Past Surgical History:   Procedure Laterality Date    HX  SECTION           Social History- reviewed:  Social History     Social History    Marital status:      Spouse name: N/A    Number of children: N/A    Years of education: N/A     Occupational History    Not on file. Social History Main Topics    Smoking status: Never Smoker    Smokeless tobacco: Never Used    Alcohol use 0.6 oz/week     1 Glasses of wine per week      Comment: rarely    Drug use: No    Sexual activity: No     Other Topics Concern    Not on file     Social History Narrative    Lives with     Daughter Candelario Marshall  2014 from colon cancer soon after having his son    Works at home but still works in [employer] 2 days per week,          Immunizations- reviewed:   Immunization History   Administered Date(s) Administered    Influenza Vaccine 10/27/2014, 10/06/2015    Influenza Vaccine (Quad) PF 10/12/2017    Tdap 10/06/2015    Zoster Vaccine, Live 2012     Review of Systems  General: No fevers or chills  HEENT: No headaches, ear pain, ear discharge, sore throat  Neck: No swollen lymph nodes  Respiratory: No cough        Objective:     Visit Vitals    /82 (BP 1 Location: Left arm, BP Patient Position: Sitting)    Pulse 82    Temp 98.4 °F (36.9 °C) (Oral)    Resp 16    Ht 5' 4\" (1.626 m)    Wt 169 lb (76.7 kg)    SpO2 98%    BMI 29.01 kg/m2       General: Alert and oriented and in no acute distress. Responds to all questions                   appropriately.   EARS: External normal, canals clear, tympanic membranes normal.     NOSE: Nasal mucosa normal       OROPHARYNX: No tonsillar erythema or exudates  NECK: Supple; no masses; no lymphadenopathy. LUNGS: Clear to auscultation bilaterally, no wheezes, rales and rhonchi. CARDIOVASCULAR: Regular rate and rhythm without murmurs, gallops or rubs. NEUROLOGIC: Speech intact; face symmetrical; moves all extremities equally. Assessment/Plan:       ICD-10-CM ICD-9-CM    1. Snoring R06.83 786.09 REFERRAL TO SLEEP STUDIES   2. Moderate persistent asthma, unspecified whether complicated J87.15 917.21    3. Essential hypertension I10 401.9 hydroCHLOROthiazide (HYDRODIURIL) 25 mg tablet     · Sleep study ordered  · Pulmicort added for moderate persistent asthma, uncontrolled. Continue Albuterol prn. · Will increase HCTZ to 25mg daily and d/c losartan as pt prefers to only be on 2 medications. No reason she must be on ACE/ARB. · Pt will call back with PT name so I can put in referral for her sciatica. Will let Patricia Corbin know. No orders of the defined types were placed in this encounter. I have discussed the diagnosis with the patient and the intended plan as seen in the above orders. The patient has received an after-visit summary and questions were answered concerning future plans. I have discussed medication side effects and warnings with the patient as well.       Francis Wood, DO

## 2017-12-11 NOTE — PROGRESS NOTES
1. Have you been to the ER, urgent care clinic since your last visit? Hospitalized since your last visit? No    2. Have you seen or consulted any other health care providers outside of the [address] [place] [address] since your last visit? Include any pap smears or colon screening. Physical therapy for back.      Chief Complaint   Patient presents with    Pneumonia     follow up    Asthma     patient has been havin asthma attacks but only at night     Not fasting

## 2017-12-11 NOTE — MR AVS SNAPSHOT
Visit Information Date & Time Provider Department Dept. Phone Encounter #  
 12/11/2017  1:00 PM Cayden Rueda DO [address] [phone] [id number] Upcoming Health Maintenance Date Due Hepatitis C Screening 1955 Pneumococcal 19-64 Highest Risk (1 of 3 - PCV13) 8/3/1973 COLONOSCOPY 1/1/2018 BREAST CANCER SCRN MAMMOGRAM 6/14/2019 PAP AKA CERVICAL CYTOLOGY 5/19/2022 DTaP/Tdap/Td series (2 - Td) 10/6/2025 Allergies as of 12/11/2017  Review Complete On: 12/11/2017 By: Cayden Rueda DO Severity Noted Reaction Type Reactions Erythromycin  09/13/2016    Nausea Only Current Immunizations  Never Reviewed Name Date Influenza Vaccine 10/6/2015, 10/27/2014 Influenza Vaccine (Quad) PF 10/12/2017 Tdap 10/6/2015 Zoster Vaccine, Live 1/1/2012 Not reviewed this visit You Were Diagnosed With   
  
 Codes Comments Snoring    -  Primary ICD-10-CM: R06.83 
ICD-9-CM: 786.09 Moderate persistent asthma, unspecified whether complicated     CQQ-78-PL: J45.40 ICD-9-CM: 493.90 Essential hypertension     ICD-10-CM: I10 
ICD-9-CM: 401.9 Vitals BP Pulse Temp Resp Height(growth percentile) Weight(growth percentile) 129/82 (BP 1 Location: Left arm, BP Patient Position: Sitting) 82 98.4 °F (36.9 °C) (Oral) 16 5' 4\" (1.626 m) 169 lb (76.7 kg) SpO2 BMI OB Status Smoking Status 98% 29.01 kg/m2 Postmenopausal Never Smoker Vitals History BMI and BSA Data Body Mass Index Body Surface Area  
 29.01 kg/m 2 1.86 m 2 Preferred Pharmacy Pharmacy Name Phone [facility] [address]  [facility] [phone] Your Updated Medication List  
  
   
This list is accurate as of: 12/11/17  1:37 PM.  Always use your most recent med list.  
  
  
  
  
 albuterol 90 mcg/actuation inhaler Commonly known as:  PROVENTIL HFA, VENTOLIN HFA, PROAIR HFA Take 1 Puff by inhalation every four (4) hours as needed for Wheezing. amLODIPine 5 mg tablet Commonly known as:  Stephen Alstrom TAKE 1 TABLET BY MOUTH DAILY  
  
 budesonide 90 mcg/actuation Aepb inhaler Commonly known as:  Scot Sergei Take 2 Puffs by inhalation two (2) times a day. diclofenac EC 75 mg EC tablet Commonly known as:  VOLTAREN Take 1 Tab by mouth two (2) times daily as needed. guaiFENesin-codeine 100-10 mg/5 mL solution Commonly known as:  ROBITUSSIN AC Take 5 mL by mouth three (3) times daily as needed for Cough. Max Daily Amount: 15 mL.  
  
 hydroCHLOROthiazide 25 mg tablet Commonly known as:  HYDRODIURIL Take 1 Tab by mouth daily. multivitamin tablet Commonly known as:  ONE A DAY Take 1 Tab by mouth daily. potassium chloride 10 mEq tablet Commonly known as:  KLOR-CON  
TAKE 1 CAPSULE BY MOUTH TWICE DAILY  
  
 venlafaxine-SR 37.5 mg capsule Commonly known as:  EFFEXOR-XR  
TAKE 1 CAPSULE BY MOUTH EVERY DAY Prescriptions Sent to Pharmacy Refills  
 hydroCHLOROthiazide (HYDRODIURIL) 25 mg tablet 11 Sig: Take 1 Tab by mouth daily. Class: Normal  
 Pharmacy: [facility] [address] [place] [address] [address] AT [facility] of [address] ([facility] Ph #: [phone] Route: Oral  
 budesonide (PULMICORT FLEXHALER) 90 mcg/actuation aepb inhaler 6 Sig: Take 2 Puffs by inhalation two (2) times a day. Class: Normal  
 Pharmacy: [facility] [address] [place] [address] [id number] AT [facility] of [address] ([facility] Ph #: [phone] Route: Inhalation We Performed the Following REFERRAL TO SLEEP STUDIES [REF99 Custom] Referral Information Referral ID Referred By Referred To  
  
 [id number] [place], [address]   
   [address] Phone: [phone] Fax: [phone] Visits Status Start Date End Date 1 New Request 12/11/17 12/11/18 If your referral has a status of pending review or denied, additional information will be sent to support the outcome of this decision. Patient Instructions Sleep Apnea: Care Instructions Your Care Instructions Sleep apnea means that you frequently stop breathing for 10 seconds or longer during sleep. It can be mild to severe, based on the number of times an hour that you stop breathing or have slowed breathing. Blocked or narrowed airways in your nose, mouth, or throat can cause sleep apnea. Your airway can become blocked when your throat muscles and tongue relax during sleep. You can treat sleep apnea at home by making lifestyle changes. You also can use a CPAP breathing machine that keeps tissues in the throat from blocking your airway. Or your doctor may suggest that you use a breathing device while you sleep. It helps keep your airway open. This could be a device that you put in your mouth. Other examples include strips or disks that you use on your nose. In some cases, surgery may be needed to remove enlarged tissues in the throat. Follow-up care is a key part of your treatment and safety. Be sure to make and go to all appointments, and call your doctor if you are having problems. It's also a good idea to know your test results and keep a list of the medicines you take. How can you care for yourself at home? · Lose weight, if needed. It may reduce the number of times you stop breathing or have slowed breathing. · Sleep on your side. It may stop mild apnea. If you tend to roll onto your back, sew a pocket in the back of your paRankingHero top. Put a tennis ball into the pocket, and stitch the pocket shut. This will help keep you from sleeping on your back. · Avoid alcohol and medicines such as sleeping pills and sedatives before bed. · Do not smoke. Smoking can make sleep apnea worse. If you need help quitting, talk to your doctor about stop-smoking programs and medicines. These can increase your chances of quitting for good. · Prop up the head of your bed 4 to 6 inches by putting bricks under the legs of the bed. · Treat breathing problems, such as a stuffy nose, caused by a cold or allergies. · Try a continuous positive airway pressure (CPAP) breathing machine if your doctor recommends it. The machine keeps your airway open when you sleep. · If CPAP does not work for you, ask your doctor if you can try other breathing machines. A bilevel positive airway pressure machine uses one type of air pressure for breathing in and another type for breathing out. Another device raises or lowers air pressure as needed while you breathe. · Talk to your doctor if: 
¨ Your nose feels dry or bleeds when you use one of these machines. You may need to increase moisture in the air. A humidifier may help. ¨ Your nose is runny or stuffy from using a breathing machine. Decongestants or a corticosteroid nasal spray may help. ¨ You are sleepy during the day and it gets in the way of the normal things you do. Do not drive when you are drowsy. When should you call for help? Watch closely for changes in your health, and be sure to contact your doctor if: 
? · You still have sleep apnea even though you have made lifestyle changes. ? · You are thinking of trying a device such as CPAP. ? · You are having problems using a CPAP or similar machine. Where can you learn more? Go to http://wilber-cj.info/. Enter B776 in the search box to learn more about \"Sleep Apnea: Care Instructions. \" Current as of: May 12, 2017 Content Version: 11.4 © 7159-9061 [employer].  Care instructions adapted under license by [employer] (which disclaims liability or warranty for this information). If you have questions about a medical condition or this instruction, always ask your healthcare professional. [address] any warranty or liability for your use of this information. Introducing \[facility] [place] [facility]\"" SERVICES! Ladarius Estrada introduces GoGoPin patient portal. Now you can access parts of your medical record, email your doctor's office, and request medication refills online. 1. In your internet browser, go to [email or web address] 2. Click on the First Time User? Click Here link in the Sign In box. You will see the New Member Sign Up page. 3. Enter your GoGoPin Access Code exactly as it appears below. You will not need to use this code after youve completed the sign-up process. If you do not sign up before the expiration date, you must request a new code. · GoGoPin Access Code: [email or web address] Expires: 12/18/2017  2:52 PM 
 
4. Enter the last four digits of your Social Security Number (xxxx) and Date of Birth (mm/dd/yyyy) as indicated and click Submit. You will be taken to the next sign-up page. 5. Create a GoGoPin ID. This will be your GoGoPin login ID and cannot be changed, so think of one that is secure and easy to remember. 6. Create a GoGoPin password. You can change your password at any time. 7. Enter your Password Reset Question and Answer. This can be used at a later time if you forget your password. 8. Enter your e-mail address. You will receive e-mail notification when new information is available in [address] 9. Click Sign Up. You can now view and download portions of your medical record. 10. Click the Download Summary menu link to download a portable copy of your medical information. If you have questions, please visit the Frequently Asked Questions section of the GoGoPin website. Remember, GoGoPin is NOT to be used for urgent needs. For medical emergencies, dial 911. Now available from your iPhone and Android! Please provide this summary of care documentation to your next provider. Your primary care clinician is listed as [place]. If you have any questions after today's visit, please call [phone].

## 2017-12-11 NOTE — PATIENT INSTRUCTIONS
Sleep Apnea: Care Instructions  Your Care Instructions    Sleep apnea means that you frequently stop breathing for 10 seconds or longer during sleep. It can be mild to severe, based on the number of times an hour that you stop breathing or have slowed breathing. Blocked or narrowed airways in your nose, mouth, or throat can cause sleep apnea. Your airway can become blocked when your throat muscles and tongue relax during sleep. You can treat sleep apnea at home by making lifestyle changes. You also can use a CPAP breathing machine that keeps tissues in the throat from blocking your airway. Or your doctor may suggest that you use a breathing device while you sleep. It helps keep your airway open. This could be a device that you put in your mouth. Other examples include strips or disks that you use on your nose. In some cases, surgery may be needed to remove enlarged tissues in the throat. Follow-up care is a key part of your treatment and safety. Be sure to make and go to all appointments, and call your doctor if you are having problems. It's also a good idea to know your test results and keep a list of the medicines you take. How can you care for yourself at home? · Lose weight, if needed. It may reduce the number of times you stop breathing or have slowed breathing. · Sleep on your side. It may stop mild apnea. If you tend to roll onto your back, sew a pocket in the back of your pajama top. Put a tennis ball into the pocket, and stitch the pocket shut. This will help keep you from sleeping on your back. · Avoid alcohol and medicines such as sleeping pills and sedatives before bed. · Do not smoke. Smoking can make sleep apnea worse. If you need help quitting, talk to your doctor about stop-smoking programs and medicines. These can increase your chances of quitting for good. · Prop up the head of your bed 4 to 6 inches by putting bricks under the legs of the bed.   · Treat breathing problems, such as a stuffy nose, caused by a cold or allergies. · Try a continuous positive airway pressure (CPAP) breathing machine if your doctor recommends it. The machine keeps your airway open when you sleep. · If CPAP does not work for you, ask your doctor if you can try other breathing machines. A bilevel positive airway pressure machine uses one type of air pressure for breathing in and another type for breathing out. Another device raises or lowers air pressure as needed while you breathe. · Talk to your doctor if:  ¨ Your nose feels dry or bleeds when you use one of these machines. You may need to increase moisture in the air. A humidifier may help. ¨ Your nose is runny or stuffy from using a breathing machine. Decongestants or a corticosteroid nasal spray may help. ¨ You are sleepy during the day and it gets in the way of the normal things you do. Do not drive when you are drowsy. When should you call for help? Watch closely for changes in your health, and be sure to contact your doctor if:  ? · You still have sleep apnea even though you have made lifestyle changes. ? · You are thinking of trying a device such as CPAP. ? · You are having problems using a CPAP or similar machine. Where can you learn more? Go to http://wilber-cj.info/. Enter Q940 in the search box to learn more about \"Sleep Apnea: Care Instructions. \"  Current as of: May 12, 2017  Content Version: 11.4  © 1001-5617 [employer]. Care instructions adapted under license by [employer] (which disclaims liability or warranty for this information). If you have questions about a medical condition or this instruction, always ask your healthcare professional. [address] any warranty or liability for your use of this information.

## 2018-01-11 ENCOUNTER — TELEPHONE (OUTPATIENT)
Dept: FAMILY MEDICINE CLINIC | Age: 63
End: 2018-01-11

## 2018-01-11 DIAGNOSIS — M54.30 SCIATICA, UNSPECIFIED LATERALITY: Primary | ICD-10-CM

## 2018-01-11 NOTE — TELEPHONE ENCOUNTER
----- Message from Yarelis Ward sent at 1/11/2018  9:35 AM EST -----  Regarding: Dr. Mika Mackenzie Telephone  Pt stated she is in need of a referral to see Dr. Naga Jacobs at [facility] ([phone] (o)[phone]. Best Contact [phone].

## 2018-01-16 RX ORDER — DICLOFENAC SODIUM 75 MG/1
TABLET, DELAYED RELEASE ORAL
Qty: 60 TAB | Refills: 0 | Status: SHIPPED | OUTPATIENT
Start: 2018-01-16 | End: 2018-02-15 | Stop reason: SDUPTHER

## 2018-01-18 ENCOUNTER — TELEPHONE (OUTPATIENT)
Dept: FAMILY MEDICINE CLINIC | Age: 63
End: 2018-01-18

## 2018-01-18 NOTE — TELEPHONE ENCOUNTER
----- Message from [facility] sent at 1/18/2018  3:32 PM EST -----  Regarding: Dr. Cinthya Romano  Pt wanted to update the referral request because she gave the incorrect fax number. The new updated referral should be sent to [phone] to Dr. Camacho Ordaz and appt is on 1/29/18.

## 2018-01-23 NOTE — TELEPHONE ENCOUNTER
Dr Syd Stokes:   This patient is going to have physical therapy on 1/29/18 @ 3 pm with [facility]. ... I need an order put into [facility] for this so I can send it to [facility]. ... Once order is written please send a message back to me so can fax it to them. ... Thanks. ... Fax # [phone]. ..

## 2018-01-25 ENCOUNTER — TELEPHONE (OUTPATIENT)
Dept: FAMILY MEDICINE CLINIC | Age: 63
End: 2018-01-25

## 2018-01-25 DIAGNOSIS — M54.30 SCIATICA, UNSPECIFIED LATERALITY: Primary | ICD-10-CM

## 2018-02-15 RX ORDER — DICLOFENAC SODIUM 75 MG/1
TABLET, DELAYED RELEASE ORAL
Qty: 60 TAB | Refills: 0 | Status: SHIPPED | OUTPATIENT
Start: 2018-02-15 | End: 2018-03-16 | Stop reason: SDUPTHER

## 2018-03-19 RX ORDER — DICLOFENAC SODIUM 75 MG/1
TABLET, DELAYED RELEASE ORAL
Qty: 60 TAB | Refills: 0 | Status: SHIPPED | OUTPATIENT
Start: 2018-03-19 | End: 2018-04-15 | Stop reason: SDUPTHER

## 2018-03-29 ENCOUNTER — OFFICE VISIT (OUTPATIENT)
Dept: FAMILY MEDICINE CLINIC | Age: 63
End: 2018-03-29

## 2018-03-29 VITALS
RESPIRATION RATE: 16 BRPM | OXYGEN SATURATION: 95 % | DIASTOLIC BLOOD PRESSURE: 74 MMHG | HEART RATE: 88 BPM | SYSTOLIC BLOOD PRESSURE: 124 MMHG | TEMPERATURE: 98 F | WEIGHT: 166 LBS | HEIGHT: 64 IN | BODY MASS INDEX: 28.34 KG/M2

## 2018-03-29 DIAGNOSIS — R05.9 COUGH: Primary | ICD-10-CM

## 2018-03-29 RX ORDER — DOXYCYCLINE 100 MG/1
100 TABLET ORAL 2 TIMES DAILY
Qty: 14 TAB | Refills: 0 | Status: SHIPPED | OUTPATIENT
Start: 2018-03-29 | End: 2018-04-05

## 2018-03-29 NOTE — PROGRESS NOTES
Subjective:      Scott Bangura is a 60 y.o. female who presents for evaluation of possible respiratory infection. Symptoms started: 1 week ago  Cough: yes  Yes sputum  Wheezing: Yes  Albuterol more than usual  Nasal congestion: Minimal   Fever: No     Allergies- reviewed: Allergies   Allergen Reactions    Erythromycin Nausea Only         Medications- reviewed:   Current Outpatient Prescriptions   Medication Sig    doxycycline (ADOXA) 100 mg tablet Take 1 Tab by mouth two (2) times a day for 7 days.  diclofenac EC (VOLTAREN) 75 mg EC tablet TAKE 1 TABLET BY MOUTH TWICE DAILY AS NEEDED    hydroCHLOROthiazide (HYDRODIURIL) 25 mg tablet Take 1 Tab by mouth daily.  budesonide (PULMICORT FLEXHALER) 90 mcg/actuation aepb inhaler Take 2 Puffs by inhalation two (2) times a day.  albuterol (PROVENTIL HFA, VENTOLIN HFA, PROAIR HFA) 90 mcg/actuation inhaler Take 1 Puff by inhalation every four (4) hours as needed for Wheezing.  amLODIPine (NORVASC) 5 mg tablet TAKE 1 TABLET BY MOUTH DAILY    venlafaxine-SR (EFFEXOR-XR) 37.5 mg capsule TAKE 1 CAPSULE BY MOUTH EVERY DAY    potassium chloride (KLOR-CON) 10 mEq tablet TAKE 1 CAPSULE BY MOUTH TWICE DAILY    multivitamin (ONE A DAY) tablet Take 1 Tab by mouth daily.  guaiFENesin-codeine (ROBITUSSIN AC) 100-10 mg/5 mL solution Take 5 mL by mouth three (3) times daily as needed for Cough. Max Daily Amount: 15 mL. (Patient not taking: Reported on 2017)     No current facility-administered medications for this visit.           Past Medical History- reviewed:  Past Medical History:   Diagnosis Date    Asthma     Hypertension     Pneumonia 2017         Past Surgical History- reviewed:   Past Surgical History:   Procedure Laterality Date    HX  SECTION           Social History- reviewed:  Social History     Social History    Marital status:      Spouse name: N/A    Number of children: N/A    Years of education: N/A Occupational History    Not on file. Social History Main Topics    Smoking status: Never Smoker    Smokeless tobacco: Never Used    Alcohol use 0.6 oz/week     1 Glasses of wine per week      Comment: rarely    Drug use: No    Sexual activity: No     Other Topics Concern    Not on file     Social History Narrative    Lives with     Daughter Barrett Mann  2014 from colon cancer soon after having his son    Works at home but still works in [employer] 2 days per week,          Immunizations- reviewed:   Immunization History   Administered Date(s) Administered    Influenza Vaccine 10/27/2014, 10/06/2015    Influenza Vaccine (Quad) PF 10/12/2017    Tdap 10/06/2015    Zoster Vaccine, Live 2012     Review of Systems  General: No fevers or chills  HEENT: No headaches, ear pain, ear discharge, sore throat  Neck: No swollen lymph nodes  Respiratory: Yes cough        Objective:     Visit Vitals    /74 (BP 1 Location: Left arm, BP Patient Position: Sitting)    Pulse 88    Temp 98 °F (36.7 °C) (Oral)    Resp 16    Ht 5' 4\" (1.626 m)    Wt 166 lb (75.3 kg)    SpO2 95%    BMI 28.49 kg/m2       General: Alert and oriented and in no acute distress. Responds to all questions                   appropriately. SKIN: No rash. HEAD: Normocephalic, atraumatic  EYES: Sclera and conjunctiva clear  EARS: External normal, canals clear, tympanic membranes normal.     NOSE: Nasal mucosa  Normal      OROPHARYNX: No tonsillar erythema or exudates  NECK: Supple; no masses; no lymphadenopathy. LUNGS: Clear to auscultation bilaterally, no wheezes, rales and rhonchi. Coarse. Decreased on R mid and lower lung fields. Coughing very frequently   CARDIOVASCULAR: Regular rate and rhythm without murmurs, gallops or rubs. NEUROLOGIC: Speech intact; face symmetrical; moves all extremities equally. Assessment/Plan:       ICD-10-CM ICD-9-CM    1.  Cough R05 786.2 XR CHEST PA LAT doxycycline (ADOXA) 100 mg tablet         - Symptomatic treatment  - Discussed likely viral, possible that it's bacterial.  Given hx of pneumonia in the fall requiring hospitalization pt requesting course of abx.    - CXR reviewed and no acute findings   - Albuterol Q4 hours prn wheezing or cough       No orders of the defined types were placed in this encounter. I have discussed the diagnosis with the patient and the intended plan as seen in the above orders. The patient has received an after-visit summary and questions were answered concerning future plans. I have discussed medication side effects and warnings with the patient as well.       Francis Wood, DO

## 2018-03-29 NOTE — PROGRESS NOTES
Chief Complaint   Patient presents with    Cough     x 1 week     1. Have you been to the ER, urgent care clinic since your last visit? Hospitalized since your last visit? No    2. Have you seen or consulted any other health care providers outside of the [address] [place] [address] since your last visit? Include any pap smears or colon screening.   No

## 2018-04-16 RX ORDER — DICLOFENAC SODIUM 75 MG/1
TABLET, DELAYED RELEASE ORAL
Qty: 60 TAB | Refills: 0 | Status: SHIPPED | OUTPATIENT
Start: 2018-04-16 | End: 2018-05-14 | Stop reason: SDUPTHER

## 2018-05-16 RX ORDER — DICLOFENAC SODIUM 75 MG/1
TABLET, DELAYED RELEASE ORAL
Qty: 60 TAB | Refills: 0 | Status: SHIPPED | OUTPATIENT
Start: 2018-05-16 | End: 2019-10-01 | Stop reason: ALTCHOICE

## 2018-05-24 ENCOUNTER — OFFICE VISIT (OUTPATIENT)
Dept: FAMILY MEDICINE CLINIC | Age: 63
End: 2018-05-24

## 2018-05-24 VITALS
HEART RATE: 82 BPM | WEIGHT: 163 LBS | DIASTOLIC BLOOD PRESSURE: 72 MMHG | BODY MASS INDEX: 27.83 KG/M2 | RESPIRATION RATE: 16 BRPM | HEIGHT: 64 IN | OXYGEN SATURATION: 96 % | SYSTOLIC BLOOD PRESSURE: 116 MMHG | TEMPERATURE: 98.1 F

## 2018-05-24 DIAGNOSIS — R10.13 EPIGASTRIC PAIN: ICD-10-CM

## 2018-05-24 DIAGNOSIS — R11.0 NAUSEA: Primary | ICD-10-CM

## 2018-05-24 LAB
BILIRUB UR QL STRIP: NEGATIVE
GLUCOSE UR-MCNC: NEGATIVE MG/DL
KETONES P FAST UR STRIP-MCNC: NEGATIVE MG/DL
PH UR STRIP: 7 [PH] (ref 4.6–8)
PROT UR QL STRIP: NEGATIVE
SP GR UR STRIP: 1.02 (ref 1–1.03)
UA UROBILINOGEN AMB POC: NORMAL (ref 0.2–1)
URINALYSIS CLARITY POC: CLEAR
URINALYSIS COLOR POC: YELLOW
URINE BLOOD POC: NEGATIVE
URINE LEUKOCYTES POC: NORMAL
URINE NITRITES POC: NEGATIVE

## 2018-05-24 RX ORDER — RANITIDINE 150 MG/1
150 TABLET, FILM COATED ORAL 2 TIMES DAILY
Qty: 60 TAB | Refills: 1 | Status: SHIPPED | OUTPATIENT
Start: 2018-05-24 | End: 2018-07-26 | Stop reason: SDUPTHER

## 2018-05-24 NOTE — PROGRESS NOTES
Chief Complaint   Patient presents with    Epigastric Pain     Started feeling sick 4 days ago, pain has subsided. PT states mild nausea, denies vomiting or diarrhea.

## 2018-05-24 NOTE — PROGRESS NOTES
Reena Maloney is a 60 y.o. female who presents today for stomach pressure for 5 days. Woke up 5 days ago with a stomach ache and \"not feeling well\". Discomfort is epigastric and constant, but significantly improved over the last 5 days. Non radiating. No alleviating or aggravating factors. Continues to feel stomach pressure. Initially had nausea and non bloody emesis X1. No changes to medication. Taking diclofenac twice a day for years to help with back pain. Denies blood in urine or stool. No black or tarry stools. Denies GERD. No sick contacts. Denies fever, chills, cp, sob, lightheaded, dizziness, dysuria, urinary frequency or urgency. ROS:   Per HPI otherwise negative    Past Medical History:  Past Medical History:   Diagnosis Date    Asthma     Hypertension     Pneumonia 2017       Past Surgical History:  Past Surgical History:   Procedure Laterality Date    HX  SECTION         Family History:  Family History   Problem Relation Age of Onset    Breast Cancer Mother 59    Hypertension Father     Cancer Sister     Colon Cancer Sister     Psychiatric Disorder Brother      schizophrenia    Cancer Other     Colon Cancer Other        Allergies: Allergies   Allergen Reactions    Erythromycin Nausea Only       Social History:  Social History   Substance Use Topics    Smoking status: Never Smoker    Smokeless tobacco: Never Used    Alcohol use 0.6 oz/week     1 Glasses of wine per week      Comment: rarely       Current Meds:  Current Outpatient Prescriptions on File Prior to Visit   Medication Sig Dispense Refill    diclofenac EC (VOLTAREN) 75 mg EC tablet TAKE 1 TABLET BY MOUTH TWICE DAILY AS NEEDED 60 Tab 0    hydroCHLOROthiazide (HYDRODIURIL) 25 mg tablet Take 1 Tab by mouth daily. 30 Tab 11    budesonide (PULMICORT FLEXHALER) 90 mcg/actuation aepb inhaler Take 2 Puffs by inhalation two (2) times a day.  1 Inhaler 6    guaiFENesin-codeine (ROBITUSSIN AC) 100-10 mg/5 mL solution Take 5 mL by mouth three (3) times daily as needed for Cough. Max Daily Amount: 15 mL. 118 mL 0    albuterol (PROVENTIL HFA, VENTOLIN HFA, PROAIR HFA) 90 mcg/actuation inhaler Take 1 Puff by inhalation every four (4) hours as needed for Wheezing. 1 Inhaler 0    amLODIPine (NORVASC) 5 mg tablet TAKE 1 TABLET BY MOUTH DAILY 90 Tab 3    venlafaxine-SR (EFFEXOR-XR) 37.5 mg capsule TAKE 1 CAPSULE BY MOUTH EVERY DAY 90 Cap 3    potassium chloride (KLOR-CON) 10 mEq tablet TAKE 1 CAPSULE BY MOUTH TWICE DAILY 180 Tab 3    multivitamin (ONE A DAY) tablet Take 1 Tab by mouth daily. No current facility-administered medications on file prior to visit. Visit Vitals    /72 (BP 1 Location: Right arm, BP Patient Position: Sitting)    Pulse 82    Temp 98.1 °F (36.7 °C) (Oral)    Resp 16    Ht 5' 4\" (1.626 m)    Wt 163 lb (73.9 kg)    SpO2 96%    BMI 27.98 kg/m2       GEN: No apparent distress. Alert and oriented and responds to all questions appropriately. EYES:  Conjunctiva clear;          LUNGS: Respirations unlabored; clear to auscultation bilaterally  CARDIOVASCULAR: Regular, rate, and rhythm without murmurs, gallops or rubs   ABDOMEN: Soft; mild tenderness in the epigastric region but no tenderness in the other areas of the abd; nondistended; normoactive bowel sounds; no masses or organomegaly; no rebound or guarding. NEUROLOGIC:  No focal neurologic deficits. EXT: moving all ext equally  SKIN: No obvious rashes.     Recent Results (from the past 24 hour(s))   AMB POC URINALYSIS DIP STICK AUTO W/O MICRO    Collection Time: 05/24/18  9:50 AM   Result Value Ref Range    Color (UA POC) Yellow     Clarity (UA POC) Clear     Glucose (UA POC) Negative Negative    Bilirubin (UA POC) Negative Negative    Ketones (UA POC) Negative Negative    Specific gravity (UA POC) 1.025 1.001 - 1.035    Blood (UA POC) Negative Negative    pH (UA POC) 7.0 4.6 - 8.0    Protein (UA POC) Negative Negative Urobilinogen (UA POC) 0.2 mg/dL 0.2 - 1    Nitrites (UA POC) Negative Negative    Leukocyte esterase (UA POC) Trace Negative       Assessment:      ICD-10-CM ICD-9-CM    1. Nausea R11.0 787.02 AMB POC URINALYSIS DIP STICK AUTO W/O MICRO      Symptoms likely secondary to NSAID use. Possible Viral gastroenteritis but viral gastroenteritis less likely as no N/V/D. May have a component of GERD causing symptoms. UA showed trace LE. Acute Cystitis less likely given lack of urinary symptoms. Vital signs are stable. Will discontinue Diclofenac as this may be worsening symptoms. Plan:  1. Discontinue Diclofenac  2. Take Tylenol as needed for pain. Discussed trying to avoid NSAIDs  3. Zantac 150mg BID but if no improvement will consider changing to PPI and EGD. Follow up in 1 week.

## 2018-05-24 NOTE — MR AVS SNAPSHOT
[address] 
[phone] Patient: Reena Maloney MRN: [id number] MDR:[birth year] Visit Information Date & Time Provider Department Dept. Phone Encounter #  
 5/24/2018  9:30 AM Alla Aleman [address] [facility] [phone] [id number] Your Appointments 5/31/2018  9:30 AM  
ACUTE CARE with Alla Aleman MD  
[address] [place] [address] [facility] App[facility]: Follow Up from 5/24/18  
 [address] [employer] [facility]. [address] [place] [address]  
[phone]  
  
   
 [facility] [phone] Upcoming Health Maintenance Date Due Hepatitis C Screening 1955 Pneumococcal 19-64 Highest Risk (1 of 3 - PCV13) 8/3/1973 COLONOSCOPY 1/1/2018 Influenza Age 5 to Adult 8/1/2018 BREAST CANCER SCRN MAMMOGRAM 6/14/2019 PAP AKA CERVICAL CYTOLOGY 5/19/2022 DTaP/Tdap/Td series (2 - Td) 10/6/2025 Allergies as of 5/24/2018  Review Complete On: 3/29/2018 By: Genie Pichardo DO Severity Noted Reaction Type Reactions Erythromycin  09/13/2016    Nausea Only Current Immunizations  Never Reviewed Name Date Influenza Vaccine 10/6/2015, 10/27/2014 Influenza Vaccine (Quad) PF 10/12/2017 Tdap 10/6/2015 Zoster Vaccine, Live 1/1/2012 Not reviewed this visit You Were Diagnosed With   
  
 Codes Comments Nausea    -  Primary ICD-10-CM: R11.0 ICD-9-CM: 787.02 Epigastric pain     ICD-10-CM: R10.13 ICD-9-CM: 789.06 Vitals BP Pulse Temp Resp Height(growth percentile) Weight(growth percentile) 116/72 (BP 1 Location: Right arm, BP Patient Position: Sitting) 82 98.1 °F (36.7 °C) (Oral) 16 5' 4\" (1.626 m) 163 lb (73.9 kg) SpO2 BMI OB Status Smoking Status 96% 27.98 kg/m2 Postmenopausal Never Smoker Vitals History BMI and BSA Data  Body Mass Index Body Surface Area  
 27.98 kg/m 2 1.83 m 2  
  
 Preferred Pharmacy Pharmacy Name Phone [facility] [address] [address] [address]  [address] [phone] Your Updated Medication List  
  
   
This list is accurate as of 5/24/18  4:34 PM.  Always use your most recent med list.  
  
  
  
  
 albuterol 90 mcg/actuation inhaler Commonly known as:  PROVENTIL HFA, VENTOLIN HFA, PROAIR HFA Take 1 Puff by inhalation every four (4) hours as needed for Wheezing. amLODIPine 5 mg tablet Commonly known as:  Timbo Rodríguez TAKE 1 TABLET BY MOUTH DAILY  
  
 budesonide 90 mcg/actuation Aepb inhaler Commonly known as:  Kj Florez Take 2 Puffs by inhalation two (2) times a day. diclofenac EC 75 mg EC tablet Commonly known as:  VOLTAREN  
TAKE 1 TABLET BY MOUTH TWICE DAILY AS NEEDED  
  
 guaiFENesin-codeine 100-10 mg/5 mL solution Commonly known as:  ROBITUSSIN AC Take 5 mL by mouth three (3) times daily as needed for Cough. Max Daily Amount: 15 mL.  
  
 hydroCHLOROthiazide 25 mg tablet Commonly known as:  HYDRODIURIL Take 1 Tab by mouth daily. multivitamin tablet Commonly known as:  ONE A DAY Take 1 Tab by mouth daily. potassium chloride 10 mEq tablet Commonly known as:  KLOR-CON  
TAKE 1 CAPSULE BY MOUTH TWICE DAILY  
  
 raNITIdine 150 mg tablet Commonly known as:  ZANTAC Take 1 Tab by mouth two (2) times a day. venlafaxine-SR 37.5 mg capsule Commonly known as:  EFFEXOR-XR  
TAKE 1 CAPSULE BY MOUTH EVERY DAY Prescriptions Sent to Pharmacy Refills  
 raNITIdine (ZANTAC) 150 mg tablet 1 Sig: Take 1 Tab by mouth two (2) times a day. Class: Normal  
 Pharmacy: [facility] [address] [place] [address] [address]  #: [phone] Route: Oral  
  
We Performed the Following AMB POC URINALYSIS DIP STICK AUTO W/O MICRO [63854 CPT(R)] Introducing [facility] SERVICES! Verito Landis introduces Mofang patient portal. Now you can access parts of your medical record, email your doctor's office, and request medication refills online. 1. In your internet browser, go to [email or web address] 2. Click on the First Time User? Click Here link in the Sign In box. You will see the New Member Sign Up page. 3. Enter your Mofang Access Code exactly as it appears below. You will not need to use this code after youve completed the sign-up process. If you do not sign up before the expiration date, you must request a new code. · Mofang Access Code: [email or web address] Expires: 8/22/2018  4:34 PM 
 
4. Enter the last four digits of your Social Security Number (xxxx) and Date of Birth (mm/dd/yyyy) as indicated and click Submit. You will be taken to the next sign-up page. 5. Create a Mofang ID. This will be your Mofang login ID and cannot be changed, so think of one that is secure and easy to remember. 6. Create a Mofang password. You can change your password at any time. 7. Enter your Password Reset Question and Answer. This can be used at a later time if you forget your password. 8. Enter your e-mail address. You will receive e-mail notification when new information is available in [address] 9. Click Sign Up. You can now view and download portions of your medical record. 10. Click the Download Summary menu link to download a portable copy of your medical information. If you have questions, please visit the Frequently Asked Questions section of the Mofang website. Remember, Mofang is NOT to be used for urgent needs. For medical emergencies, dial 911. Now available from your iPhone and Android! Please provide this summary of care documentation to your next provider. Your primary care clinician is listed as Jason Liu. If you have any questions after today's visit, please call [phone].

## 2018-05-31 ENCOUNTER — OFFICE VISIT (OUTPATIENT)
Dept: FAMILY MEDICINE CLINIC | Age: 63
End: 2018-05-31

## 2018-05-31 VITALS
WEIGHT: 162 LBS | HEIGHT: 64 IN | HEART RATE: 85 BPM | TEMPERATURE: 98.4 F | OXYGEN SATURATION: 96 % | DIASTOLIC BLOOD PRESSURE: 68 MMHG | RESPIRATION RATE: 18 BRPM | BODY MASS INDEX: 27.66 KG/M2 | SYSTOLIC BLOOD PRESSURE: 106 MMHG

## 2018-05-31 DIAGNOSIS — R10.13 EPIGASTRIC PAIN: Primary | ICD-10-CM

## 2018-05-31 NOTE — PROGRESS NOTES
Chief Complaint   Patient presents with    Abdominal Pain     follow up epigastric pain, pt states no more pain or nausea      1. Have you been to the ER, urgent care clinic since your last visit? Hospitalized since your last visit? No    2. Have you seen or consulted any other health care providers outside of the [address] [place] [address] since your last visit? Include any pap smears or colon screening.  No

## 2018-05-31 NOTE — PROGRESS NOTES
Errol Oviedo is a 60 y.o. female who presents for f/u of epigastric pain. Seen in clinic last week. She stopped NSAIDs and started zantac. Pain has completely resolved. Eating well. No n/v. She is using tylenol PRN for pain. She reports restarting her exercise program and this has helped with her joint pain. PMHx:  Past Medical History:   Diagnosis Date    Asthma     Hypertension     Pneumonia 09/21/2017       Meds:   Current Outpatient Prescriptions   Medication Sig Dispense Refill    raNITIdine (ZANTAC) 150 mg tablet Take 1 Tab by mouth two (2) times a day. 60 Tab 1    hydroCHLOROthiazide (HYDRODIURIL) 25 mg tablet Take 1 Tab by mouth daily. 30 Tab 11    albuterol (PROVENTIL HFA, VENTOLIN HFA, PROAIR HFA) 90 mcg/actuation inhaler Take 1 Puff by inhalation every four (4) hours as needed for Wheezing. 1 Inhaler 0    amLODIPine (NORVASC) 5 mg tablet TAKE 1 TABLET BY MOUTH DAILY 90 Tab 3    venlafaxine-SR (EFFEXOR-XR) 37.5 mg capsule TAKE 1 CAPSULE BY MOUTH EVERY DAY 90 Cap 3    potassium chloride (KLOR-CON) 10 mEq tablet TAKE 1 CAPSULE BY MOUTH TWICE DAILY 180 Tab 3    multivitamin (ONE A DAY) tablet Take 1 Tab by mouth daily.  diclofenac EC (VOLTAREN) 75 mg EC tablet TAKE 1 TABLET BY MOUTH TWICE DAILY AS NEEDED 60 Tab 0    guaiFENesin-codeine (ROBITUSSIN AC) 100-10 mg/5 mL solution Take 5 mL by mouth three (3) times daily as needed for Cough. Max Daily Amount: 15 mL. 118 mL 0       Allergies:    Allergies   Allergen Reactions    Erythromycin Nausea Only       Smoker:  History   Smoking Status    Never Smoker   Smokeless Tobacco    Never Used       ETOH:   History   Alcohol Use    0.6 oz/week    1 Glasses of wine per week     Comment: rarely       FH:   Family History   Problem Relation Age of Onset    Breast Cancer Mother 59    Hypertension Father     Cancer Sister     Colon Cancer Sister     Psychiatric Disorder Brother      schizophrenia    Cancer Other     Colon Cancer Other        ROS:  Per HPI    Physical Exam:  Visit Vitals    /68    Pulse 85    Temp 98.4 °F (36.9 °C) (Oral)    Resp 18    Ht 5' 4\" (1.626 m)    Wt 162 lb (73.5 kg)    SpO2 96%    BMI 27.81 kg/m2     GEN: No apparent distress. Alert and oriented and responds to all questions appropriately. EYES:  Conjunctiva clear;          LUNGS: Respirations unlabored; clear to auscultation bilaterally  CARDIOVASCULAR: Regular, rate, and rhythm without murmurs, gallops or rubs   ABDOMEN: Soft; nontender; nondistended; normoactive bowel sounds; no masses or organomegaly  EXT: Well perfused. No edema. SKIN: No obvious rashes. Assessment:    ICD-10-CM ICD-9-CM    1. Epigastric pain R10.13 789.06    Likely GERD. Currently resolved. Plan:  Continue Zantac  Tylenol PRN. Avoid NSAIDs. Rx for Prevnar 13 given and she will have this administered at the pharmacy. RTC: 3 months for f/u of HTN and asthma. Can get PPSV 23 at that time. RTC sooner is stomach pain returns.

## 2018-05-31 NOTE — MR AVS SNAPSHOT
[address] 
[phone] Patient: Reena Maloney MRN: [id number]:[id number][birth year]4/[id number] Visit Information Date & Time Provider Department Dept. Phone Encounter #  
 5/31/2018  9:30 AM Alla Aleman, [address] [facility] [phone] [id number] Upcoming Health Maintenance Date Due Hepatitis C Screening 1955 Pneumococcal 19-64 Highest Risk (1 of 3 - PCV13) 8/3/1973 COLONOSCOPY 1/1/2018 Influenza Age 5 to Adult 8/1/2018 BREAST CANCER SCRN MAMMOGRAM 6/14/2019 PAP AKA CERVICAL CYTOLOGY 5/19/2022 DTaP/Tdap/Td series (2 - Td) 10/6/2025 Allergies as of 5/31/2018  Review Complete On: 5/31/2018 By: Alla Aleman MD  
  
 Severity Noted Reaction Type Reactions Erythromycin  09/13/2016    Nausea Only Current Immunizations  Never Reviewed Name Date Influenza Vaccine 10/6/2015, 10/27/2014 Influenza Vaccine (Quad) PF 10/12/2017 Tdap 10/6/2015 Zoster Vaccine, Live 1/1/2012 Not reviewed this visit You Were Diagnosed With   
  
 Codes Comments Epigastric pain    -  Primary ICD-10-CM: R10.13 ICD-9-CM: 789.06 Vitals BP Pulse Temp Resp Height(growth percentile) Weight(growth percentile) 106/68 85 98.4 °F (36.9 °C) (Oral) 18 5' 4\" (1.626 m) 162 lb (73.5 kg) SpO2 BMI OB Status Smoking Status 96% 27.81 kg/m2 Postmenopausal Never Smoker Vitals History BMI and BSA Data Body Mass Index Body Surface Area  
 27.81 kg/m 2 1.82 m 2 Preferred Pharmacy Pharmacy Name Phone [facility] [address]  [facility] [phone] Your Updated Medication List  
  
   
This list is accurate as of 5/31/18  4:57 PM.  Always use your most recent med list.  
  
  
  
  
 albuterol 90 mcg/actuation inhaler Commonly known as:  PROVENTIL HFA, VENTOLIN HFA, PROAIR HFA Take 1 Puff by inhalation every four (4) hours as needed for Wheezing. amLODIPine 5 mg tablet Commonly known as:  Unknown [place] TAKE 1 TABLET BY MOUTH DAILY  
  
 diclofenac EC 75 mg EC tablet Commonly known as:  VOLTAREN  
TAKE 1 TABLET BY MOUTH TWICE DAILY AS NEEDED  
  
 guaiFENesin-codeine 100-10 mg/5 mL solution Commonly known as:  ROBITUSSIN AC Take 5 mL by mouth three (3) times daily as needed for Cough. Max Daily Amount: 15 mL.  
  
 hydroCHLOROthiazide 25 mg tablet Commonly known as:  HYDRODIURIL Take 1 Tab by mouth daily. multivitamin tablet Commonly known as:  ONE A DAY Take 1 Tab by mouth daily. potassium chloride 10 mEq tablet Commonly known as:  KLOR-CON  
TAKE 1 CAPSULE BY MOUTH TWICE DAILY  
  
 raNITIdine 150 mg tablet Commonly known as:  ZANTAC Take 1 Tab by mouth two (2) times a day. venlafaxine-SR 37.5 mg capsule Commonly known as:  EFFEXOR-XR  
TAKE 1 CAPSULE BY MOUTH EVERY DAY To-Do List   
 06/20/2018 11:00 AM  
  Appointment with [facility] DEB 2 at [facility] Mammography ([phone]) Shower or bathe using soap and water. Do not use deodorant, powder, perfumes, or lotion the day of your exam.  If your prior mammograms were not performed at [facility] 6 please bring films with you or forward prior images 2 days before your procedure. Check in at registration 15min before your appointment time unless you were instructed to do otherwise. A script is not necessary for screening. If you have one, please bring it on the day of the mammogram or have it faxed to the department. [facility]  [phone] [facility] [phone] [facility] [phone] [facility] [phone] Introducing [facility] & [facility] SERVICES! Janessa Moreno introduces [employer] patient portal. Now you can access parts of your medical record, email your doctor's office, and request medication refills online. 1. In your internet browser, go to [email or web address] 2. Click on the First Time User? Click Here link in the Sign In box. You will see the New Member Sign Up page. 3. Enter your FieldEZ Access Code exactly as it appears below. You will not need to use this code after youve completed the sign-up process. If you do not sign up before the expiration date, you must request a new code. · FieldEZ Access Code: [email or web address] Expires: 8/22/2018  4:34 PM 
 
4. Enter the last four digits of your Social Security Number (xxxx) and Date of Birth (mm/dd/yyyy) as indicated and click Submit. You will be taken to the next sign-up page. 5. Create a InterStelNett ID. This will be your FieldEZ login ID and cannot be changed, so think of one that is secure and easy to remember. 6. Create a FieldEZ password. You can change your password at any time. 7. Enter your Password Reset Question and Answer. This can be used at a later time if you forget your password. 8. Enter your e-mail address. You will receive e-mail notification when new information is available in [address] 9. Click Sign Up. You can now view and download portions of your medical record. 10. Click the Download Summary menu link to download a portable copy of your medical information. If you have questions, please visit the Frequently Asked Questions section of the FieldEZ website. Remember, FieldEZ is NOT to be used for urgent needs. For medical emergencies, dial 911. Now available from your iPhone and Android! Please provide this summary of care documentation to your next provider. Your primary care clinician is listed as Zafar Soto. If you have any questions after today's visit, please call [phone].

## 2018-06-22 ENCOUNTER — HOSPITAL ENCOUNTER (OUTPATIENT)
Dept: MAMMOGRAPHY | Age: 63
Discharge: HOME | End: 2018-06-22
Attending: FAMILY MEDICINE
Payer: COMMERCIAL

## 2018-06-22 DIAGNOSIS — Z12.31 VISIT FOR SCREENING MAMMOGRAM: ICD-10-CM

## 2018-06-22 PROCEDURE — 77063 BREAST TOMOSYNTHESIS BI: CPT

## 2018-07-26 RX ORDER — RANITIDINE 150 MG/1
TABLET, FILM COATED ORAL
Qty: 60 TAB | Refills: 0 | Status: SHIPPED | OUTPATIENT
Start: 2018-07-26 | End: 2018-09-17 | Stop reason: SDUPTHER

## 2018-09-17 RX ORDER — RANITIDINE 150 MG/1
TABLET, FILM COATED ORAL
Qty: 60 TAB | Refills: 0 | Status: SHIPPED | OUTPATIENT
Start: 2018-09-17 | End: 2018-10-16 | Stop reason: SDUPTHER

## 2018-10-16 ENCOUNTER — CLINICAL SUPPORT (OUTPATIENT)
Dept: FAMILY MEDICINE CLINIC | Age: 63
End: 2018-10-16

## 2018-10-16 DIAGNOSIS — Z23 ENCOUNTER FOR IMMUNIZATION: Primary | ICD-10-CM

## 2018-10-16 RX ORDER — VENLAFAXINE HYDROCHLORIDE 37.5 MG/1
CAPSULE, EXTENDED RELEASE ORAL
Qty: 90 CAP | Refills: 0 | Status: SHIPPED | OUTPATIENT
Start: 2018-10-16 | End: 2019-01-29 | Stop reason: SDUPTHER

## 2018-10-16 RX ORDER — AMLODIPINE BESYLATE 5 MG/1
TABLET ORAL
Qty: 90 TAB | Refills: 0 | Status: SHIPPED | OUTPATIENT
Start: 2018-10-16 | End: 2019-01-29 | Stop reason: SDUPTHER

## 2018-10-16 RX ORDER — POTASSIUM CHLORIDE 750 MG/1
TABLET, EXTENDED RELEASE ORAL
Qty: 180 TAB | Refills: 0 | Status: SHIPPED | OUTPATIENT
Start: 2018-10-16 | End: 2019-01-29 | Stop reason: SDUPTHER

## 2018-10-16 RX ORDER — RANITIDINE 150 MG/1
TABLET, FILM COATED ORAL
Qty: 60 TAB | Refills: 0 | Status: SHIPPED | OUTPATIENT
Start: 2018-10-16 | End: 2018-11-19 | Stop reason: SDUPTHER

## 2018-11-20 RX ORDER — RANITIDINE 150 MG/1
TABLET, FILM COATED ORAL
Qty: 60 TAB | Refills: 0 | Status: SHIPPED | OUTPATIENT
Start: 2018-11-20 | End: 2018-12-26 | Stop reason: SDUPTHER

## 2018-12-26 DIAGNOSIS — I10 ESSENTIAL HYPERTENSION: ICD-10-CM

## 2018-12-26 RX ORDER — RANITIDINE 150 MG/1
TABLET, FILM COATED ORAL
Qty: 60 TAB | Refills: 0 | Status: SHIPPED | OUTPATIENT
Start: 2018-12-26 | End: 2019-01-29 | Stop reason: SDUPTHER

## 2018-12-27 NOTE — TELEPHONE ENCOUNTER
Dr. Santosh Jenkins telephone   Received: Today   Message Contents   [address]             Pt is requesting call and  awaiting approval for Hydrochlorothiazide. Pharmacy is on file.  Best contact [phone]

## 2019-01-03 DIAGNOSIS — I10 ESSENTIAL HYPERTENSION: ICD-10-CM

## 2019-01-03 RX ORDER — HYDROCHLOROTHIAZIDE 25 MG/1
TABLET ORAL
Qty: 30 TAB | Refills: 0 | OUTPATIENT
Start: 2019-01-03

## 2019-01-03 RX ORDER — HYDROCHLOROTHIAZIDE 25 MG/1
TABLET ORAL
Qty: 30 TAB | Refills: 0 | Status: SHIPPED | OUTPATIENT
Start: 2019-01-03 | End: 2019-04-23 | Stop reason: SDUPTHER

## 2019-01-04 ENCOUNTER — TELEPHONE (OUTPATIENT)
Dept: FAMILY MEDICINE CLINIC | Age: 64
End: 2019-01-04

## 2019-01-08 ENCOUNTER — OFFICE VISIT (OUTPATIENT)
Dept: FAMILY MEDICINE CLINIC | Age: 64
End: 2019-01-08

## 2019-01-08 VITALS
SYSTOLIC BLOOD PRESSURE: 114 MMHG | BODY MASS INDEX: 27.83 KG/M2 | HEIGHT: 64 IN | RESPIRATION RATE: 16 BRPM | WEIGHT: 163 LBS | HEART RATE: 79 BPM | DIASTOLIC BLOOD PRESSURE: 73 MMHG | OXYGEN SATURATION: 97 % | TEMPERATURE: 98.1 F

## 2019-01-08 DIAGNOSIS — I10 ESSENTIAL HYPERTENSION: ICD-10-CM

## 2019-01-08 DIAGNOSIS — J02.9 REFLUX PHARYNGITIS: ICD-10-CM

## 2019-01-08 DIAGNOSIS — Z00.00 ANNUAL PHYSICAL EXAM: Primary | ICD-10-CM

## 2019-01-08 RX ORDER — ACETAMINOPHEN 500 MG
TABLET ORAL
COMMUNITY
End: 2022-01-27

## 2019-01-08 NOTE — PROGRESS NOTES
Juhi Fuentes is a 58 y.o. female who presents for annual physical and f/u on HTN and GERD. No complaints. Taking medication as prescribed and denies side effects. Rides bike daily for about 3 miles. Diet is overall good. She has received her flu vaccination this year and reports that she got her shingles vaccination. Next colonoscopy in 1 year. She would like to stop zantac and see if sx remain controlled. PMHx:  Past Medical History:   Diagnosis Date    Asthma     Hypertension     Pneumonia 09/21/2017       Meds:   Current Outpatient Medications   Medication Sig Dispense Refill    acetaminophen (TYLENOL) 500 mg tablet Take  by mouth every six (6) hours as needed for Pain.  hydroCHLOROthiazide (HYDRODIURIL) 25 mg tablet TAKE 1 TABLET BY MOUTH DAILY 30 Tab 0    raNITIdine (ZANTAC) 150 mg tablet TAKE 1 TABLET BY MOUTH TWICE DAILY 60 Tab 0    amLODIPine (NORVASC) 5 mg tablet TAKE 1 TABLET BY MOUTH DAILY 90 Tab 0    potassium chloride (KLOR-CON) 10 mEq tablet TAKE 1 TABLET BY MOUTH TWICE DAILY 180 Tab 0    venlafaxine-SR (EFFEXOR-XR) 37.5 mg capsule TAKE 1 CAPSULE BY MOUTH EVERY DAY 90 Cap 0    albuterol (PROVENTIL HFA, VENTOLIN HFA, PROAIR HFA) 90 mcg/actuation inhaler Take 1 Puff by inhalation every four (4) hours as needed for Wheezing. 1 Inhaler 0    multivitamin (ONE A DAY) tablet Take 1 Tab by mouth daily.  diclofenac EC (VOLTAREN) 75 mg EC tablet TAKE 1 TABLET BY MOUTH TWICE DAILY AS NEEDED 60 Tab 0       Allergies:    Allergies   Allergen Reactions    Erythromycin Nausea Only       Smoker:  Social History     Tobacco Use   Smoking Status Never Smoker   Smokeless Tobacco Never Used       ETOH:   Social History     Substance and Sexual Activity   Alcohol Use Yes    Alcohol/week: 0.6 oz    Types: 1 Glasses of wine per week    Comment: rarely       FH:   Family History   Problem Relation Age of Onset    Breast Cancer Mother 59    Hypertension Father     Cancer Sister    [facility] Colon Cancer Sister     Psychiatric Disorder Brother         schizophrenia    Cancer Other     Colon Cancer Other        ROS:  General/Constitutional:   No headache, fever, fatigue, weight loss or weight gain       Eyes:   No visual changes, swelling, or discharge      Ears:    No  loss or changes in hearing   Cardiac:    No chest pain      Respiratory:   No cough or shortness of breath     GI:   No nausea/vomiting, diarrhea, abdominal pain, bloody or dark stools       :   No dysuria or  hematuria    Neurological:   No cognitive changes, memory changes,  problems with balance, or unilateral weakness     Skin: No rash     Physical Exam:  Visit Vitals  /73   Pulse 79   Temp 98.1 °F (36.7 °C) (Oral)   Resp 16   Ht 5' 4\" (1.626 m)   Wt 163 lb (73.9 kg)   SpO2 97%   BMI 27.98 kg/m²     GEN: No apparent distress. Alert and oriented and responds to all questions appropriately. EYES:  Conjunctiva clear; pupils round and reactive to light; extraocular movements are intact. EAR: External ears are normal.  Tympanic membranes are clear and without effusion. NOSE: Turbinates are within normal limits. No drainage  OROPHYARYNX: No oral lesions or exudates. NECK:  Supple; no masses; thyroid normal           LUNGS: Respirations unlabored; clear to auscultation bilaterally  CARDIOVASCULAR: Regular, rate, and rhythm without murmurs, gallops or rubs   ABDOMEN: Soft; nontender; nondistended; normoactive bowel sounds; no masses or organomegaly  NEUROLOGIC:  No focal neurologic deficits. Strength and sensation grossly intact. Coordination and gait grossly intact. EXT: Well perfused. No edema. SKIN: No obvious rashes. Assessment:    ICD-10-CM ICD-9-CM    1. Annual physical exam E50.15 Q61.9 METABOLIC PANEL, COMPREHENSIVE      CBC W/O DIFF      LIPID PANEL      TSH 3RD GENERATION   2. Essential hypertension I10 401.9    3. Reflux pharyngitis J02.9 462        Plan:  HTN: Well controlled.  Continue HCTZ and Norvasc. GERD: Well controlled. She will do a trial off the zantac to see if sx return. Restart zantac as needed. Discussed diet and exercise. Next colonoscopy in 1 year. Flu immunization up to date  Shingles immunization up to date. RTC: 6 months for HTN check up and PRN.

## 2019-01-08 NOTE — PROGRESS NOTES
Chief Complaint   Patient presents with    Hypertension     follow up     1. Have you been to the ER, urgent care clinic since your last visit? Hospitalized since your last visit? No    2. Have you seen or consulted any other health care providers outside of the [address] [place] [address] since your last visit? Include any pap smears or colon screening.  No

## 2019-01-10 LAB
ALBUMIN SERPL-MCNC: 5.1 G/DL (ref 3.6–4.8)
ALBUMIN/GLOB SERPL: 1.9 {RATIO} (ref 1.2–2.2)
ALP SERPL-CCNC: 87 IU/L (ref 39–117)
ALT SERPL-CCNC: 20 IU/L (ref 0–32)
AST SERPL-CCNC: 18 IU/L (ref 0–40)
BILIRUB SERPL-MCNC: 0.4 MG/DL (ref 0–1.2)
BUN SERPL-MCNC: 17 MG/DL (ref 8–27)
BUN/CREAT SERPL: 21 (ref 12–28)
CALCIUM SERPL-MCNC: 10.5 MG/DL (ref 8.7–10.3)
CHLORIDE SERPL-SCNC: 99 MMOL/L (ref 96–106)
CHOLEST SERPL-MCNC: 255 MG/DL (ref 100–199)
CO2 SERPL-SCNC: 26 MMOL/L (ref 20–29)
CREAT SERPL-MCNC: 0.82 MG/DL (ref 0.57–1)
ERYTHROCYTE [DISTWIDTH] IN BLOOD BY AUTOMATED COUNT: 13.5 % (ref 12.3–15.4)
GLOBULIN SER CALC-MCNC: 2.7 G/DL (ref 1.5–4.5)
GLUCOSE SERPL-MCNC: 107 MG/DL (ref 65–99)
HCT VFR BLD AUTO: 44.2 % (ref 34–46.6)
HDLC SERPL-MCNC: 93 MG/DL
HGB BLD-MCNC: 15.1 G/DL (ref 11.1–15.9)
INTERPRETATION, 910389: NORMAL
LDLC SERPL CALC-MCNC: 138 MG/DL (ref 0–99)
MCH RBC QN AUTO: 32.4 PG (ref 26.6–33)
MCHC RBC AUTO-ENTMCNC: 34.2 G/DL (ref 31.5–35.7)
MCV RBC AUTO: 95 FL (ref 79–97)
PLATELET # BLD AUTO: 355 X10E3/UL (ref 150–379)
POTASSIUM SERPL-SCNC: 4.2 MMOL/L (ref 3.5–5.2)
PROT SERPL-MCNC: 7.8 G/DL (ref 6–8.5)
RBC # BLD AUTO: 4.66 X10E6/UL (ref 3.77–5.28)
SODIUM SERPL-SCNC: 147 MMOL/L (ref 134–144)
TRIGL SERPL-MCNC: 120 MG/DL (ref 0–149)
TSH SERPL DL<=0.005 MIU/L-ACNC: 2.51 UIU/ML (ref 0.45–4.5)
VLDLC SERPL CALC-MCNC: 24 MG/DL (ref 5–40)
WBC # BLD AUTO: 7.2 X10E3/UL (ref 3.4–10.8)

## 2019-01-29 RX ORDER — VENLAFAXINE HYDROCHLORIDE 37.5 MG/1
CAPSULE, EXTENDED RELEASE ORAL
Qty: 90 CAP | Refills: 0 | Status: SHIPPED | OUTPATIENT
Start: 2019-01-29 | End: 2019-04-29 | Stop reason: SDUPTHER

## 2019-01-29 RX ORDER — RANITIDINE 150 MG/1
TABLET, FILM COATED ORAL
Qty: 60 TAB | Refills: 0 | Status: SHIPPED | OUTPATIENT
Start: 2019-01-29 | End: 2019-04-23 | Stop reason: SDUPTHER

## 2019-01-29 RX ORDER — AMLODIPINE BESYLATE 5 MG/1
TABLET ORAL
Qty: 90 TAB | Refills: 0 | Status: SHIPPED | OUTPATIENT
Start: 2019-01-29 | End: 2019-04-23 | Stop reason: SDUPTHER

## 2019-01-29 RX ORDER — POTASSIUM CHLORIDE 750 MG/1
TABLET, EXTENDED RELEASE ORAL
Qty: 180 TAB | Refills: 0 | Status: SHIPPED | OUTPATIENT
Start: 2019-01-29 | End: 2019-04-23 | Stop reason: SDUPTHER

## 2019-04-29 RX ORDER — VENLAFAXINE HYDROCHLORIDE 37.5 MG/1
CAPSULE, EXTENDED RELEASE ORAL
Qty: 90 CAP | Refills: 0 | Status: SHIPPED | OUTPATIENT
Start: 2019-04-29 | End: 2019-08-14 | Stop reason: SDUPTHER

## 2019-09-26 ENCOUNTER — HOSPITAL ENCOUNTER (OUTPATIENT)
Dept: MAMMOGRAPHY | Age: 64
Discharge: HOME | End: 2019-09-26
Attending: FAMILY MEDICINE
Payer: MEDICARE

## 2019-09-26 DIAGNOSIS — Z12.39 SCREENING BREAST EXAMINATION: ICD-10-CM

## 2019-09-26 PROCEDURE — 77063 BREAST TOMOSYNTHESIS BI: CPT

## 2019-09-30 ENCOUNTER — HOSPITAL ENCOUNTER (OUTPATIENT)
Dept: LAB | Age: 64
Discharge: HOME | End: 2019-09-30
Payer: MEDICARE

## 2019-09-30 ENCOUNTER — OFFICE VISIT (OUTPATIENT)
Dept: FAMILY MEDICINE CLINIC | Age: 64
End: 2019-09-30

## 2019-09-30 VITALS
HEART RATE: 62 BPM | BODY MASS INDEX: 26.98 KG/M2 | DIASTOLIC BLOOD PRESSURE: 65 MMHG | TEMPERATURE: 98.2 F | SYSTOLIC BLOOD PRESSURE: 118 MMHG | WEIGHT: 158 LBS | RESPIRATION RATE: 16 BRPM | OXYGEN SATURATION: 99 % | HEIGHT: 64 IN

## 2019-09-30 DIAGNOSIS — R06.02 SOB (SHORTNESS OF BREATH): Primary | ICD-10-CM

## 2019-09-30 DIAGNOSIS — I10 ESSENTIAL HYPERTENSION: ICD-10-CM

## 2019-09-30 PROCEDURE — 36415 COLL VENOUS BLD VENIPUNCTURE: CPT

## 2019-09-30 PROCEDURE — 80053 COMPREHEN METABOLIC PANEL: CPT

## 2019-09-30 PROCEDURE — 85027 COMPLETE CBC AUTOMATED: CPT

## 2019-09-30 PROCEDURE — 80061 LIPID PANEL: CPT

## 2019-09-30 NOTE — PATIENT INSTRUCTIONS
Stress test scheduled 10/01/2019 [facility] arrive at 730am  appt at 8am nothing to eat or drink after midnight . Comfort clothes and shoes. Hold betablocker and cardiac meds 24 hrs prior

## 2019-09-30 NOTE — PROGRESS NOTES
Chief Complaint   Patient presents with    Hypertension     f/u    Shortness of Breath     pt states SOB with activity ( vacuuming 2 rooms)     1. Have you been to the ER, urgent care clinic since your last visit? Hospitalized since your last visit? No    2. Have you seen or consulted any other health care providers outside of the [address] [place] [address] since your last visit? Include any pap smears or colon screening.  No       Health Maintenance Due   Topic Date Due    Hepatitis C Screening  1955    Shingrix Vaccine Age 50> (1 of 2) 08/03/2004    GLAUCOMA SCREENING Q2Y  08/03/2019    Bone Densitometry (Dexa) Screening  08/03/2019    MEDICARE YEARLY EXAM  09/19/2019

## 2019-09-30 NOTE — PROGRESS NOTES
Danii Galicia is a 71 y.o. female with a medical history of HTN, YUDY and asthma who presents for HTN follow up. Currently on HCTZ 25 mg and norvasc 5mg. Has been experiencing some SOB for 2-3 weeks. She notices it mostly when she rides her bike or vacuums the house. She is an active person and rides her bike everyday . Has never experienced this before. No CP. No diaphoresis, n/v. She has a h/o asthma does not use any medications currently for her asthma as she has not had sx. She reports no wheezing or coughing when feeling SOB. Sx resolve quickly with rest.  Currently asymptomatic. Denies any new medications. Denies any wheezing, cough, fever, swelling, PND, palpitations, chest pain, or recent travel. She is a nonsmoker. PMHx:  Past Medical History:   Diagnosis Date    Asthma     Hypertension     Pneumonia 09/21/2017       Meds:   Current Outpatient Medications   Medication Sig Dispense Refill    venlafaxine-SR (EFFEXOR-XR) 37.5 mg capsule TAKE ONE CAPSULE BY MOUTH EVERY DAY 90 Cap 0    hydroCHLOROthiazide (HYDRODIURIL) 25 mg tablet TAKE 1 TABLET BY MOUTH DAILY 90 Tab 3    potassium chloride (KLOR-CON) 10 mEq tablet TAKE 1 TABLET BY MOUTH TWICE DAILY 180 Tab 3    amLODIPine (NORVASC) 5 mg tablet TAKE 1 TABLET BY MOUTH DAILY 90 Tab 3    raNITIdine (ZANTAC) 150 mg tablet TAKE 1 TABLET BY MOUTH TWICE DAILY 180 Tab 3    acetaminophen (TYLENOL) 500 mg tablet Take  by mouth every six (6) hours as needed for Pain.  multivitamin (ONE A DAY) tablet Take 1 Tab by mouth daily.  diclofenac EC (VOLTAREN) 75 mg EC tablet TAKE 1 TABLET BY MOUTH TWICE DAILY AS NEEDED 60 Tab 0    albuterol (PROVENTIL HFA, VENTOLIN HFA, PROAIR HFA) 90 mcg/actuation inhaler Take 1 Puff by inhalation every four (4) hours as needed for Wheezing. 1 Inhaler 0       Allergies:    Allergies   Allergen Reactions    Erythromycin Nausea Only       Smoker:  Social History     Tobacco Use   Smoking Status Never Smoker Smokeless Tobacco Never Used       ETOH:   Social History     Substance and Sexual Activity   Alcohol Use Yes    Alcohol/week: 1.0 standard drinks    Types: 1 Glasses of wine per week    Comment: rarely       FH:   Family History   Problem Relation Age of Onset    Breast Cancer Mother 59    Hypertension Father     Cancer Sister     Colon Cancer Sister     Psychiatric Disorder Brother         schizophrenia    Cancer Other     Colon Cancer Other        ROS:  General/Constitutional:   No headache, fever, fatigue, weight loss or weight gain       Eyes:   No redness, pruritis, pain, visual changes, swelling, or discharge      Ears:    No pain, loss or changes in hearing     Neck:   No swelling, masses, stiffness, pain, or limited movement     Cardiac:    No chest pain      Respiratory:   No cough . +shortness of breath     GI:   No nausea/vomiting, diarrhea, abdominal pain, bloody or dark stools       :   No dysuria or  hematuria    Neurological:   No loss of consciousness, dizziness, seizures, dysarthria, cognitive changes, memory changes,  problems with balance, or unilateral weakness     Skin: No rash     Physical Exam:  Visit Vitals  /65 (BP 1 Location: Left arm, BP Patient Position: Sitting)   Pulse 62   Temp 98.2 °F (36.8 °C) (Oral)   Resp 16   Ht 5' 4\" (1.626 m)   Wt 158 lb (71.7 kg)   SpO2 99%   BMI 27.12 kg/m²     GEN: No apparent distress. Alert and oriented and responds to all questions appropriately. Beckey Conquest LUNGS: Respirations unlabored; clear to auscultation bilaterally  CARDIOVASCULAR: Regular, rate, and rhythm without murmurs, gallops or rubs   NEUROLOGIC:  No focal neurologic deficits. Strength and sensation grossly intact. Coordination and gait grossly intact. EXT: Well perfused. No edema. SKIN: No obvious rashes. ECG: NSR, NRL axis, no ischemic changes    Assessment:    ICD-10-CM ICD-9-CM    1.  SOB (shortness of breath) R06.02 786.05 AMB POC EKG ROUTINE W/ 12 LEADS, INTER & REP   2. HTN    Plan:  HTN: Well controlled. Continue HCTZ and Norvasc  CASIANO: ECG WNL. Will check labs today.   Will get cardiac treadmill stress test.    RTC: After stress test

## 2019-10-01 ENCOUNTER — HOSPITAL ENCOUNTER (OUTPATIENT)
Dept: NON INVASIVE DIAGNOSTICS | Age: 64
Discharge: HOME | End: 2019-10-01
Attending: FAMILY MEDICINE
Payer: MEDICARE

## 2019-10-01 ENCOUNTER — TELEPHONE (OUTPATIENT)
Dept: FAMILY MEDICINE CLINIC | Age: 64
End: 2019-10-01

## 2019-10-01 VITALS
SYSTOLIC BLOOD PRESSURE: 120 MMHG | WEIGHT: 158 LBS | BODY MASS INDEX: 26.98 KG/M2 | DIASTOLIC BLOOD PRESSURE: 74 MMHG | HEIGHT: 64 IN

## 2019-10-01 DIAGNOSIS — R06.02 SOB (SHORTNESS OF BREATH): ICD-10-CM

## 2019-10-01 LAB
STRESS ANGINA INDEX: 0
STRESS BASELINE DIAS BP: 74 MMHG
STRESS BASELINE HR: 68 BPM
STRESS BASELINE SYS BP: 120 MMHG
STRESS ESTIMATED WORKLOAD: 10.7 METS
STRESS EXERCISE DUR MIN: NORMAL
STRESS PEAK DIAS BP: 100 MMHG
STRESS PEAK SYS BP: 160 MMHG
STRESS PERCENT HR ACHIEVED: 86 %
STRESS POST PEAK HR: 133 BPM
STRESS RATE PRESSURE PRODUCT: NORMAL BPM*MMHG
STRESS SR DUKE TREADMILL SCORE: 0
STRESS ST DEPRESSION: 0 MM
STRESS ST ELEVATION: 0 MM
STRESS TARGET HR: 155 BPM

## 2019-10-01 PROCEDURE — 93017 CV STRESS TEST TRACING ONLY: CPT

## 2019-10-01 NOTE — TELEPHONE ENCOUNTER
Pt notified message below       ----- Message from Yovani Jean-Baptiste MD sent at 10/1/2019 12:06 PM EDT -----  Please tell patient that her stress test was normal. Thanks.

## 2019-10-02 LAB
ALBUMIN SERPL-MCNC: 4.7 G/DL (ref 3.6–4.8)
ALBUMIN/GLOB SERPL: 2 {RATIO} (ref 1.2–2.2)
ALP SERPL-CCNC: 91 IU/L (ref 39–117)
ALT SERPL-CCNC: 13 IU/L (ref 0–32)
AST SERPL-CCNC: 17 IU/L (ref 0–40)
BILIRUB SERPL-MCNC: 0.5 MG/DL (ref 0–1.2)
BUN SERPL-MCNC: 20 MG/DL (ref 8–27)
BUN/CREAT SERPL: 23 (ref 12–28)
CALCIUM SERPL-MCNC: 9.8 MG/DL (ref 8.7–10.3)
CHLORIDE SERPL-SCNC: 95 MMOL/L (ref 96–106)
CHOLEST SERPL-MCNC: 230 MG/DL (ref 100–199)
CO2 SERPL-SCNC: 26 MMOL/L (ref 20–29)
CREAT SERPL-MCNC: 0.87 MG/DL (ref 0.57–1)
ERYTHROCYTE [DISTWIDTH] IN BLOOD BY AUTOMATED COUNT: 11.9 % (ref 12.3–15.4)
GLOBULIN SER CALC-MCNC: 2.3 G/DL (ref 1.5–4.5)
GLUCOSE SERPL-MCNC: 97 MG/DL (ref 65–99)
HCT VFR BLD AUTO: 40.8 % (ref 34–46.6)
HDLC SERPL-MCNC: 86 MG/DL
HGB BLD-MCNC: 13.9 G/DL (ref 11.1–15.9)
INTERPRETATION, 910389: NORMAL
LDLC SERPL CALC-MCNC: 126 MG/DL (ref 0–99)
MCH RBC QN AUTO: 31 PG (ref 26.6–33)
MCHC RBC AUTO-ENTMCNC: 34.1 G/DL (ref 31.5–35.7)
MCV RBC AUTO: 91 FL (ref 79–97)
PLATELET # BLD AUTO: 381 X10E3/UL (ref 150–450)
POTASSIUM SERPL-SCNC: 3.9 MMOL/L (ref 3.5–5.2)
PROT SERPL-MCNC: 7 G/DL (ref 6–8.5)
RBC # BLD AUTO: 4.48 X10E6/UL (ref 3.77–5.28)
SODIUM SERPL-SCNC: 141 MMOL/L (ref 134–144)
TRIGL SERPL-MCNC: 92 MG/DL (ref 0–149)
VLDLC SERPL CALC-MCNC: 18 MG/DL (ref 5–40)
WBC # BLD AUTO: 8.3 X10E3/UL (ref 3.4–10.8)

## 2019-10-04 ENCOUNTER — HOSPITAL ENCOUNTER (OUTPATIENT)
Dept: MAMMOGRAPHY | Age: 64
Discharge: HOME | End: 2019-10-04
Attending: FAMILY MEDICINE
Payer: MEDICARE

## 2019-10-04 DIAGNOSIS — R92.8 ABNORMAL MAMMOGRAM: ICD-10-CM

## 2019-10-04 PROCEDURE — 77065 DX MAMMO INCL CAD UNI: CPT

## 2019-11-21 RX ORDER — VENLAFAXINE HYDROCHLORIDE 37.5 MG/1
CAPSULE, EXTENDED RELEASE ORAL
Qty: 90 CAP | Refills: 1 | Status: SHIPPED | OUTPATIENT
Start: 2019-11-21 | End: 2020-05-18

## 2019-11-21 NOTE — TELEPHONE ENCOUNTER
----- Message from [facility] sent at 11/20/2019  4:09 PM EST -----  Regarding: / Refill  Contact: [phone]  Medication Refill    Best contact number(s): [phone]      Name of medication and dosage if known: venlafaxine- 37.5mg      Is patient out of this medication (yes/no): yes      Pharm[facility] name: [facility] [address] listed in chart? (yes/no): Yes  Pharmacy phone number: [phone]      Details to clarify the request: She advised a refill was sent in but was denied.       Shelby Johnston

## 2020-05-18 RX ORDER — VENLAFAXINE HYDROCHLORIDE 37.5 MG/1
CAPSULE, EXTENDED RELEASE ORAL
Qty: 90 CAP | Refills: 1 | Status: SHIPPED | OUTPATIENT
Start: 2020-05-18 | End: 2020-08-07

## 2020-05-20 DIAGNOSIS — I10 ESSENTIAL HYPERTENSION: ICD-10-CM

## 2020-05-20 RX ORDER — RANITIDINE 150 MG/1
TABLET, FILM COATED ORAL
Qty: 180 TAB | Refills: 3 | Status: SHIPPED | OUTPATIENT
Start: 2020-05-20 | End: 2020-05-21

## 2020-05-20 RX ORDER — AMLODIPINE BESYLATE 5 MG/1
TABLET ORAL
Qty: 90 TAB | Refills: 3 | Status: SHIPPED | OUTPATIENT
Start: 2020-05-20 | End: 2021-06-09

## 2020-05-20 RX ORDER — HYDROCHLOROTHIAZIDE 25 MG/1
TABLET ORAL
Qty: 90 TAB | Refills: 3 | Status: SHIPPED | OUTPATIENT
Start: 2020-05-20 | End: 2021-06-09

## 2020-05-20 RX ORDER — POTASSIUM CHLORIDE 750 MG/1
TABLET, FILM COATED, EXTENDED RELEASE ORAL
Qty: 180 TAB | Refills: 3 | Status: SHIPPED | OUTPATIENT
Start: 2020-05-20 | End: 2021-06-17

## 2020-05-21 RX ORDER — FAMOTIDINE 10 MG/1
10 TABLET ORAL 2 TIMES DAILY
Qty: 180 TAB | Refills: 1 | Status: SHIPPED | OUTPATIENT
Start: 2020-05-21 | End: 2021-03-16

## 2020-07-14 ENCOUNTER — OFFICE VISIT (OUTPATIENT)
Dept: FAMILY MEDICINE CLINIC | Age: 65
End: 2020-07-14

## 2020-07-14 ENCOUNTER — HOSPITAL ENCOUNTER (OUTPATIENT)
Dept: LAB | Age: 65
Discharge: HOME | End: 2020-07-14

## 2020-07-14 VITALS
DIASTOLIC BLOOD PRESSURE: 61 MMHG | HEART RATE: 73 BPM | OXYGEN SATURATION: 100 % | WEIGHT: 160.2 LBS | RESPIRATION RATE: 15 BRPM | TEMPERATURE: 97.7 F | BODY MASS INDEX: 27.35 KG/M2 | HEIGHT: 64 IN | SYSTOLIC BLOOD PRESSURE: 100 MMHG

## 2020-07-14 DIAGNOSIS — Z78.0 POSTMENOPAUSAL: Primary | ICD-10-CM

## 2020-07-14 DIAGNOSIS — Z00.00 PREVENTATIVE HEALTH CARE: ICD-10-CM

## 2020-07-14 DIAGNOSIS — N28.1 RENAL CYST: ICD-10-CM

## 2020-07-14 LAB
ALBUMIN SERPL-MCNC: 4.2 G/DL (ref 3.5–5)
ALBUMIN/GLOB SERPL: 1.3 {RATIO} (ref 1.1–2.2)
ALP SERPL-CCNC: 93 U/L (ref 45–117)
ALT SERPL-CCNC: 26 U/L (ref 12–78)
ANION GAP SERPL CALC-SCNC: 5 MMOL/L (ref 5–15)
AST SERPL-CCNC: 16 U/L (ref 15–37)
BILIRUB SERPL-MCNC: 0.5 MG/DL (ref 0.2–1)
BUN SERPL-MCNC: 19 MG/DL (ref 6–20)
BUN/CREAT SERPL: 23 (ref 12–20)
CALCIUM SERPL-MCNC: 9.6 MG/DL (ref 8.5–10.1)
CHLORIDE SERPL-SCNC: 101 MMOL/L (ref 97–108)
CO2 SERPL-SCNC: 34 MMOL/L (ref 21–32)
CREAT SERPL-MCNC: 0.84 MG/DL (ref 0.55–1.02)
GLOBULIN SER CALC-MCNC: 3.2 G/DL (ref 2–4)
GLUCOSE SERPL-MCNC: 95 MG/DL (ref 65–100)
POTASSIUM SERPL-SCNC: 3.8 MMOL/L (ref 3.5–5.1)
PROT SERPL-MCNC: 7.4 G/DL (ref 6.4–8.2)
SODIUM SERPL-SCNC: 140 MMOL/L (ref 136–145)

## 2020-07-14 NOTE — PROGRESS NOTES
[address]     Chief Complaint:   Chief Complaint   Patient presents with    Results     discuss MRI results     Labs       SUBJECTIVE:  Cole Dodge is a 71 y.o. female who presents for further evaluation of renal cyst.  She had an MRI of the lumbar spine done at [facility] imaging for low back pain and had an incidental finding of complex renal cyst.  She was told by her orthopedic surgeon to follow up with her PCP. She bring the MRI report with her today. She is currently asymptomatic. No prior CKD on labs. No prior renal imaging on her chart. PMHx:  Past Medical History:   Diagnosis Date    Asthma     Hypertension     Pneumonia 09/21/2017       Meds:   Current Outpatient Medications   Medication Sig Dispense Refill    acetaminophen (TYLENOL) 500 mg tablet Take  by mouth every six (6) hours as needed for Pain.  albuterol (PROVENTIL HFA, VENTOLIN HFA, PROAIR HFA) 90 mcg/actuation inhaler Take 1 Puff by inhalation every four (4) hours as needed for Wheezing. 1 Inhaler 0    multivitamin (ONE A DAY) tablet Take 1 Tab by mouth daily.  famotidine (PEPCID) 10 mg tablet Take 1 Tab by mouth two (2) times a day. 180 Tab 1    hydroCHLOROthiazide (HYDRODIURIL) 25 mg tablet TAKE 1 TABLET BY MOUTH EVERY DAY 90 Tab 3    amLODIPine (NORVASC) 5 mg tablet TAKE 1 TABLET BY MOUTH EVERY DAY 90 Tab 3    potassium chloride SR (KLOR-CON 10) 10 mEq tablet TAKE 1 TABLET BY MOUTH TWICE A  Tab 3    venlafaxine-SR (EFFEXOR-XR) 37.5 mg capsule TAKE 1 CAPSULE BY MOUTH EVERY DAY 90 Cap 1       Allergies:    Allergies   Allergen Reactions    Erythromycin Nausea Only       Smoker:  Social History     Tobacco Use   Smoking Status Never Smoker   Smokeless Tobacco Never Used       ETOH:   Social History     Substance and Sexual Activity   Alcohol Use Yes    Alcohol/week: 1.0 standard drinks    Types: 1 Glasses of wine per week    Comment: rarely       FH:   Family History Problem Relation Age of Onset    Breast Cancer Mother 59    Hypertension Father     Cancer Sister     Colon Cancer Sister     Psychiatric Disorder Brother         schizophrenia    Cancer Other     Colon Cancer Other        ROS:  General/Constitutional:   No headache, fever, fatigue, weight loss or weight gain       Cardiac:    No chest pain      Respiratory:   No cough or shortness of breath     GI:   No nausea/vomiting, diarrhea, abdominal pain       :   No dysuria or  hematuria      Physical Exam:  Visit Vitals  /61 (BP 1 Location: Left arm, BP Patient Position: Sitting)   Pulse 73   Temp 97.7 °F (36.5 °C) (Oral)   Resp 15   Ht 5' 4\" (1.626 m)   Wt 160 lb 3.2 oz (72.7 kg)   SpO2 100%   BMI 27.50 kg/m²     GEN: No apparent distress. Alert and oriented and responds to all questions appropriately. EYES:  Conjunctiva clear;     LUNGS: Respirations unlabored; clear to auscultation bilaterally  CARDIOVASCULAR: Regular, rate, and rhythm without murmurs, gallops or rubs   BACK: No CVA tenderness  NEUROLOGIC:  No focal neurologic deficits. EXT: Well perfused. No edema. SKIN: No obvious rashes. Assessment:    ICD-10-CM ICD-9-CM    1. Postmenopausal  Z78.0 V49.81 DEXA BONE DENSITY STUDY AXIAL   2. Preventative health care  G13.63 V61.4 METABOLIC PANEL, COMPREHENSIVE      HEMOGLOBIN A1C WITH EAG      DEXA BONE DENSITY STUDY AXIAL      HEPATITIS C AB   3. Renal cyst  N28.1 753.10 US RETROPERITONEUM COMP       Plan:  Renal Cyst: Currently asymptomatic. Previously normal BUN/Cr. Renal ultrasound and CMP ordered. Report of the MRI of the lumbar spine scanned into media. Preventive Health: Dexa scan and hep C screen ordered. RTC: TBD after US.

## 2020-07-15 LAB
EST. AVERAGE GLUCOSE BLD GHB EST-MCNC: 117 MG/DL
HBA1C MFR BLD: 5.7 % (ref 4–5.6)
HCV AB SERPL QL IA: NONREACTIVE
HCV COMMENT,HCGAC: NORMAL

## 2020-07-24 ENCOUNTER — HOSPITAL ENCOUNTER (OUTPATIENT)
Dept: MAMMOGRAPHY | Age: 65
Discharge: HOME | End: 2020-07-24
Attending: FAMILY MEDICINE
Payer: MEDICARE

## 2020-07-24 ENCOUNTER — HOSPITAL ENCOUNTER (OUTPATIENT)
Dept: ULTRASOUND IMAGING | Age: 65
Discharge: HOME | End: 2020-07-24
Attending: FAMILY MEDICINE
Payer: MEDICARE

## 2020-07-24 DIAGNOSIS — N28.1 RENAL CYST: ICD-10-CM

## 2020-07-24 DIAGNOSIS — Z00.00 PREVENTATIVE HEALTH CARE: ICD-10-CM

## 2020-07-24 DIAGNOSIS — Z78.0 POSTMENOPAUSAL: ICD-10-CM

## 2020-07-24 PROCEDURE — 77080 DXA BONE DENSITY AXIAL: CPT

## 2020-07-24 PROCEDURE — 76770 US EXAM ABDO BACK WALL COMP: CPT

## 2020-07-29 ENCOUNTER — PATIENT MESSAGE (OUTPATIENT)
Dept: FAMILY MEDICINE CLINIC | Age: 65
End: 2020-07-29

## 2020-07-29 DIAGNOSIS — N28.1 RENAL CYST: Primary | ICD-10-CM

## 2020-07-29 NOTE — TELEPHONE ENCOUNTER
----- Message from Dilip Robledo MD sent at 7/29/2020  3:44 PM EDT -----  Please tell patient that her renal ultrasound showed small cyst.  These appear benign. Her kidney function was normal on labs. Please refer to Nephrology for additional evaluation. Thanks.

## 2020-07-29 NOTE — TELEPHONE ENCOUNTER
Pt notified Please tell patient that her renal ultrasound showed small cyst.  These appear benign.  Her kidney function was normal on labs.  Please refer to Nephrology for additional evaluation

## 2020-08-07 ENCOUNTER — VIRTUAL VISIT (OUTPATIENT)
Dept: FAMILY MEDICINE CLINIC | Age: 65
End: 2020-08-07
Payer: MEDICARE

## 2020-08-07 DIAGNOSIS — R51.9 ACUTE NONINTRACTABLE HEADACHE, UNSPECIFIED HEADACHE TYPE: Primary | ICD-10-CM

## 2020-08-07 PROCEDURE — 3017F COLORECTAL CA SCREEN DOC REV: CPT | Performed by: STUDENT IN AN ORGANIZED HEALTH CARE EDUCATION/TRAINING PROGRAM

## 2020-08-07 PROCEDURE — 1090F PRES/ABSN URINE INCON ASSESS: CPT | Performed by: STUDENT IN AN ORGANIZED HEALTH CARE EDUCATION/TRAINING PROGRAM

## 2020-08-07 PROCEDURE — G8399 PT W/DXA RESULTS DOCUMENT: HCPCS | Performed by: STUDENT IN AN ORGANIZED HEALTH CARE EDUCATION/TRAINING PROGRAM

## 2020-08-07 PROCEDURE — G8432 DEP SCR NOT DOC, RNG: HCPCS | Performed by: STUDENT IN AN ORGANIZED HEALTH CARE EDUCATION/TRAINING PROGRAM

## 2020-08-07 PROCEDURE — G8428 CUR MEDS NOT DOCUMENT: HCPCS | Performed by: STUDENT IN AN ORGANIZED HEALTH CARE EDUCATION/TRAINING PROGRAM

## 2020-08-07 PROCEDURE — 1101F PT FALLS ASSESS-DOCD LE1/YR: CPT | Performed by: STUDENT IN AN ORGANIZED HEALTH CARE EDUCATION/TRAINING PROGRAM

## 2020-08-07 PROCEDURE — 99213 OFFICE O/P EST LOW 20 MIN: CPT | Performed by: STUDENT IN AN ORGANIZED HEALTH CARE EDUCATION/TRAINING PROGRAM

## 2020-08-07 PROCEDURE — G9899 SCRN MAM PERF RSLTS DOC: HCPCS | Performed by: STUDENT IN AN ORGANIZED HEALTH CARE EDUCATION/TRAINING PROGRAM

## 2020-08-07 PROCEDURE — G8756 NO BP MEASURE DOC: HCPCS | Performed by: STUDENT IN AN ORGANIZED HEALTH CARE EDUCATION/TRAINING PROGRAM

## 2020-08-07 NOTE — PROGRESS NOTES
Arlette Short  76 y.o. female  1955  [address] [address]  1 [facility]:    Telemedicine Progress Note  Hany Guzman MD       Encounter Date and Time: August 8, 2020 at 1:38 PM    Consent: Arlette Short, who was seen by synchronous (real-time) audio-video technology, and/or her healthcare decision maker, is aware that this patient-initiated, Telehealth encounter on 8/7/2020 is a billable service, with coverage as determined by her insurance carrier. She is aware that she may receive a bill and has provided verbal consent to proceed: Yes. Chief Complaint   Patient presents with    Headache     History of Present Illness   Arlette Short is a 76 y.o. female was evaluated by synchronous (real-time) audio-video technology from home, through a secure patient portal.    Patient states that for the past week has been experiencing headaches. She attributes headaches to discontinuing her effexor on 7/31/2020. States she tapered the medication (she took half a pill for a week, then took half a pill every other day for another week and then took halfa pill every two days for another week) her last dose of Effexor was on 7/31. She had been on the medication for over 15 years which was originally prescribed for anxiety. She states than since discontinuing Effexor she has had intermittent headaches, pressure like in nature, 4/10 intensity, which resolve with acetaminophen. States that headaches have overall improved. Today she does not have a headache. Patient also reports increased number of BMs a day, states that since discontinuing effexor she has had 2-3 BMs a day that are \"softer\" consistency, she usually has 1 BM. She denies any abdominal pain, melena, hematochezia, N/V, decreased appetite/PO, focal deficits, scotomas, .  Had colonoscopy three years ago with Dr. Dara Mensah and was told it was completely normal.         Review of Systems   Review of Systems   Constitutional: Negative for chills and fever. HENT: Negative for congestion and sore throat. Eyes: Negative for blurred vision and double vision. Respiratory: Negative for cough, hemoptysis, sputum production, shortness of breath and wheezing. Cardiovascular: Negative for chest pain, palpitations and leg swelling. Gastrointestinal: Negative for abdominal pain, blood in stool, constipation, diarrhea, heartburn, melena, nausea and vomiting. Increased number of BMs   Genitourinary: Negative for dysuria and urgency. Musculoskeletal: Negative for back pain and joint pain. Skin: Negative for rash. Neurological: Positive for headaches. Negative for dizziness and focal weakness. Psychiatric/Behavioral: Negative for suicidal ideas. Vitals/Objective:     General: alert, cooperative, no distress   Mental  status: mental status: alert, oriented to person, place, and time, normal mood, behavior, speech, dress, motor activity, and thought processes   Resp: resp: normal effort and no respiratory distress   Neuro: neuro: no gross deficits   Skin: skin: no discoloration or lesions of concern on visible areas   Due to this being a TeleHealth evaluation, many elements of the physical examination are unable to be assessed. Assessment and Plan:   Time-based coding, delete if not needed: I spent at least 25 minutes with this established patient, and >50% of the time was spent counseling and/or coordinating care regarding headache       Assessment/Plan:  Headache likely secondary to discontinuation of SNRI. Patient had been on Effexor for over fifteen years and tapered herself off. Last dose coincides with onset of headache. Patient states headaches have improved and resolve with acetaminophen.  She has no alarming associated symptoms.   - Patient was advised to carefully monitor symptoms and if headaches do not resolve in two weeks to make follow up appointment. Increased number of BMs: Patient having 2-3 BMs that are softer in consistency. She usually has one BM a day. Patient denies any alarming symptoms and reports she had a colonoscopy three years ago with Dr. Duncan Love which was normal. Onset of symptoms coincide with las dose of Effexor. Could be secondary to discontinuation of this medication after being on it for over fifteen years. - Patient was advised to  Monitor symptoms. Was given strict ED precautions. Time spent in direct conversation with the patient to include medical condition(s) discussed, assessment and treatment plan:       We discussed the expected course, resolution and complications of the diagnosis(es) in detail. Medication risks, benefits, costs, interactions, and alternatives were discussed as indicated. I advised her to contact the office if her condition worsens, changes or fails to improve as anticipated. She expressed understanding with the diagnosis(es) and plan. Patient understands that this encounter was a temporary measure, and the importance of further follow up and examination was emphasized. Patient verbalized understanding. Patient informed to follow up: In two weeks if headache does not resolve. Patient was discussed with Dr. Charlene Mcnamara, Attending Physician. Electronically Signed: Jason Cortez MD    CPT Codes 53361-97645 for Established Patients may apply to this Telehealth Visit. POS code: 18. Modifier GT    Freeman Hemphill is a 76 y.o. female who was evaluated by an audio-video encounter for concerns as above. Patient identification was verified prior to start of the visit. A caregiver was present when appropriate. Due to this being a TeleHealth encounter (During JATCJ-76 public health emergency), evaluation of the following organ systems was limited: Vitals/Constitutional/EENT/Resp/CV/GI//MS/Neuro/Skin/Heme-Lymph-Imm.   Pursuant to the emergency declaration under the [employer] and the National Emergencies Act, [facility] waiver authority and the [employer] General Act, this Virtual Visit was conducted, with patient's (and/or legal guardian's) consent, to reduce the patient's risk of exposure to COVID-19 and provide necessary medical care. Services were provided through a synchronous discussion virtually to substitute for in-person clinic visit. I was at home. The patient was at home. History   Patients past medical, surgical and family histories were reviewed and updated. Past Medical History:   Diagnosis Date    Asthma     Hypertension     Pneumonia 2017     Past Surgical History:   Procedure Laterality Date    HX  SECTION       Family History   Problem Relation Age of Onset    Breast Cancer Mother 59    Hypertension Father     Cancer Sister     Colon Cancer Sister     Psychiatric Disorder Brother         schizophrenia    Cancer Other     Colon Cancer Other      Social History     Socioeconomic History    Marital status:      Spouse name: Not on file    Number of children: Not on file    Years of education: Not on file    Highest education level: Not on file   Occupational History    Not on file   Social Needs    Financial resource strain: Not on file    Food insecurity     Worry: Not on file     Inability: Not on file    Transportation needs     Medical: Not on file     Non-medical: Not on file   Tobacco Use    Smoking status: Never Smoker    Smokeless tobacco: Never Used   Substance and Sexual Activity    Alcohol use:  Yes     Alcohol/week: 1.0 standard drinks     Types: 1 Glasses of wine per week     Comment: rarely    Drug use: No    Sexual activity: Never   Lifestyle    Physical activity     Days per week: Not on file     Minutes per session: Not on file    Stress: Not on file   Relationships    Social connections     Talks on phone: Not on file     Gets together: Not on file     Attends Latter-day service: Not on file     Active member of club or organization: Not on file     Attends meetings of clubs or organizations: Not on file     Relationship status: Not on file    Intimate partner violence     Fear of current or ex partner: Not on file     Emotionally abused: Not on file     Physically abused: Not on file     Forced sexual activity: Not on file   Other Topics Concern    Not on file   Social History Narrative    Lives with     Daughter Brigitte Loyola  2014 from colon cancer soon after having his son    Works at home but still works in [employer] 2 days per week,      Patient Active Problem List   Diagnosis Code    Essential hypertension with goal blood pressure less than 140/90 I10    Family history of colon cancer Z80.0    Overweight (BMI 25.0-29. 9) [id number]    Chronic midline low back pain without sciatica M54.5, G89.29    Reflux pharyngitis J02.9    YUDY (generalized anxiety disorder) F41.1    History of basal cell carcinoma Z85.828    Hypokalemia E87.6    Osteopenia of spine M85.88    Preventative health care Z00.00    Prediabetes R73.03    Cyst of left ovary N83.202    Nephrolithiasis N20.0    Bilateral renal cysts N28.1    Hepatic cyst K76.89    Pneumonia J18.9    CAP (community acquired pneumonia) J18.9          Current Medications/Allergies   Medications and Allergies reviewed:    Current Outpatient Medications   Medication Sig Dispense Refill    famotidine (PEPCID) 10 mg tablet Take 1 Tab by mouth two (2) times a day. 180 Tab 1    hydroCHLOROthiazide (HYDRODIURIL) 25 mg tablet TAKE 1 TABLET BY MOUTH EVERY DAY 90 Tab 3    amLODIPine (NORVASC) 5 mg tablet TAKE 1 TABLET BY MOUTH EVERY DAY 90 Tab 3    potassium chloride SR (KLOR-CON 10) 10 mEq tablet TAKE 1 TABLET BY MOUTH TWICE A  Tab 3    acetaminophen (TYLENOL) 500 mg tablet Take  by mouth every six (6) hours as needed for Pain.       albuterol (PROVENTIL HFA, VENTOLIN HFA, PROAIR HFA) 90 mcg/actuation inhaler Take 1 Puff by inhalation every four (4) hours as needed for Wheezing. 1 Inhaler 0    multivitamin (ONE A DAY) tablet Take 1 Tab by mouth daily.        Allergies   Allergen Reactions    Erythromycin Nausea Only

## 2020-08-19 ENCOUNTER — TELEPHONE (OUTPATIENT)
Dept: FAMILY MEDICINE CLINIC | Age: 65
End: 2020-08-19

## 2020-09-16 ENCOUNTER — TELEPHONE (OUTPATIENT)
Dept: FAMILY MEDICINE CLINIC | Age: 65
End: 2020-09-16

## 2020-09-16 NOTE — TELEPHONE ENCOUNTER
Need to speak with you in reference to leg cramps in both of her legs. ... It is waking her up at night & she cant sleep. ... It happens every night. ... Patient want to know what she can do. .. Please call. ...

## 2020-09-18 ENCOUNTER — OFFICE VISIT (OUTPATIENT)
Dept: FAMILY MEDICINE CLINIC | Age: 65
End: 2020-09-18
Payer: MEDICARE

## 2020-09-18 VITALS
BODY MASS INDEX: 27.49 KG/M2 | RESPIRATION RATE: 20 BRPM | SYSTOLIC BLOOD PRESSURE: 119 MMHG | DIASTOLIC BLOOD PRESSURE: 67 MMHG | OXYGEN SATURATION: 97 % | TEMPERATURE: 97.8 F | HEART RATE: 73 BPM | HEIGHT: 64 IN | WEIGHT: 161 LBS

## 2020-09-18 DIAGNOSIS — R25.2 LEG CRAMP: Primary | ICD-10-CM

## 2020-09-18 LAB
ANION GAP SERPL CALC-SCNC: 4 MMOL/L (ref 5–15)
BUN SERPL-MCNC: 16 MG/DL (ref 6–20)
BUN/CREAT SERPL: 19 (ref 12–20)
CALCIUM SERPL-MCNC: 9.9 MG/DL (ref 8.5–10.1)
CHLORIDE SERPL-SCNC: 101 MMOL/L (ref 97–108)
CO2 SERPL-SCNC: 32 MMOL/L (ref 21–32)
CREAT SERPL-MCNC: 0.84 MG/DL (ref 0.55–1.02)
ERYTHROCYTE [DISTWIDTH] IN BLOOD BY AUTOMATED COUNT: 14.3 % (ref 11.5–14.5)
GLUCOSE SERPL-MCNC: 89 MG/DL (ref 65–100)
HCT VFR BLD AUTO: 46.3 % (ref 35–47)
HGB BLD-MCNC: 15 G/DL (ref 11.5–16)
MAGNESIUM SERPL-MCNC: 2.2 MG/DL (ref 1.6–2.4)
MCH RBC QN AUTO: 31.8 PG (ref 26–34)
MCHC RBC AUTO-ENTMCNC: 32.4 G/DL (ref 30–36.5)
MCV RBC AUTO: 98.1 FL (ref 80–99)
NRBC # BLD: 0 K/UL (ref 0–0.01)
NRBC BLD-RTO: 0 PER 100 WBC
PLATELET # BLD AUTO: 382 K/UL (ref 150–400)
PMV BLD AUTO: 10 FL (ref 8.9–12.9)
POTASSIUM SERPL-SCNC: 3.7 MMOL/L (ref 3.5–5.1)
RBC # BLD AUTO: 4.72 M/UL (ref 3.8–5.2)
SODIUM SERPL-SCNC: 137 MMOL/L (ref 136–145)
WBC # BLD AUTO: 7.3 K/UL (ref 3.6–11)

## 2020-09-18 PROCEDURE — G8536 NO DOC ELDER MAL SCRN: HCPCS | Performed by: STUDENT IN AN ORGANIZED HEALTH CARE EDUCATION/TRAINING PROGRAM

## 2020-09-18 PROCEDURE — G0463 HOSPITAL OUTPT CLINIC VISIT: HCPCS | Performed by: STUDENT IN AN ORGANIZED HEALTH CARE EDUCATION/TRAINING PROGRAM

## 2020-09-18 PROCEDURE — 1090F PRES/ABSN URINE INCON ASSESS: CPT | Performed by: STUDENT IN AN ORGANIZED HEALTH CARE EDUCATION/TRAINING PROGRAM

## 2020-09-18 PROCEDURE — G8427 DOCREV CUR MEDS BY ELIG CLIN: HCPCS | Performed by: STUDENT IN AN ORGANIZED HEALTH CARE EDUCATION/TRAINING PROGRAM

## 2020-09-18 PROCEDURE — 3017F COLORECTAL CA SCREEN DOC REV: CPT | Performed by: STUDENT IN AN ORGANIZED HEALTH CARE EDUCATION/TRAINING PROGRAM

## 2020-09-18 PROCEDURE — 99213 OFFICE O/P EST LOW 20 MIN: CPT | Performed by: STUDENT IN AN ORGANIZED HEALTH CARE EDUCATION/TRAINING PROGRAM

## 2020-09-18 PROCEDURE — G9899 SCRN MAM PERF RSLTS DOC: HCPCS | Performed by: STUDENT IN AN ORGANIZED HEALTH CARE EDUCATION/TRAINING PROGRAM

## 2020-09-18 PROCEDURE — G8419 CALC BMI OUT NRM PARAM NOF/U: HCPCS | Performed by: STUDENT IN AN ORGANIZED HEALTH CARE EDUCATION/TRAINING PROGRAM

## 2020-09-18 PROCEDURE — 1101F PT FALLS ASSESS-DOCD LE1/YR: CPT | Performed by: STUDENT IN AN ORGANIZED HEALTH CARE EDUCATION/TRAINING PROGRAM

## 2020-09-18 PROCEDURE — G8432 DEP SCR NOT DOC, RNG: HCPCS | Performed by: STUDENT IN AN ORGANIZED HEALTH CARE EDUCATION/TRAINING PROGRAM

## 2020-09-18 PROCEDURE — G8752 SYS BP LESS 140: HCPCS | Performed by: STUDENT IN AN ORGANIZED HEALTH CARE EDUCATION/TRAINING PROGRAM

## 2020-09-18 PROCEDURE — G8754 DIAS BP LESS 90: HCPCS | Performed by: STUDENT IN AN ORGANIZED HEALTH CARE EDUCATION/TRAINING PROGRAM

## 2020-09-18 PROCEDURE — G8399 PT W/DXA RESULTS DOCUMENT: HCPCS | Performed by: STUDENT IN AN ORGANIZED HEALTH CARE EDUCATION/TRAINING PROGRAM

## 2020-09-18 NOTE — PROGRESS NOTES
Identified Patient with two Patient identifiers (Name and ). Two Patient Identifiers confirmed. Reviewed record in preparation for visit and have obtained necessary documentation. Chief Complaint   Patient presents with    Leg Pain     Patient has been having muscle cramping in bilateral legs at night x3 weeks       Visit Vitals  /67 (BP 1 Location: Right arm, BP Patient Position: Sitting)   Pulse 73   Temp 97.8 °F (36.6 °C) (Temporal)   Resp 20   Ht 5' 4\" (1.626 m)   Wt 161 lb (73 kg)   SpO2 97%   BMI 27.64 kg/m²       1. Have you been to the ER, urgent care clinic since your last visit? Hospitalized since your last visit? No    2. Have you seen or consulted any other health care providers outside of the [address] [place] [address] since your last visit? Include any pap smears or colon screening.  No

## 2020-09-18 NOTE — PROGRESS NOTES
[facility]    Subjective:     CC: leg cramp     History provided by patient     Adriana Head is a 76 y.o. female with history of HTN, BCC, nephrolithiasis, YUDY, prediabetes    Pt is coming in for a cc of leg cramping. Ongoing for 3 weeks. Initially 3 times per night and waking her up from sleep. Pain is 10/10. Over the last week it has been more sporadic and frequency has been less. Sometimes bilateral and sometimes unilateral. Takes HCTZ but has been complaint with potassium. Drinks about 30 ounces a day. No other symptoms- no nausea, vomit or night sweats. Takes some tylenol which helps. No redness or swelling. Current Outpatient Medications on File Prior to Visit   Medication Sig Dispense Refill    hydroCHLOROthiazide (HYDRODIURIL) 25 mg tablet TAKE 1 TABLET BY MOUTH EVERY DAY 90 Tab 3    amLODIPine (NORVASC) 5 mg tablet TAKE 1 TABLET BY MOUTH EVERY DAY 90 Tab 3    potassium chloride SR (KLOR-CON 10) 10 mEq tablet TAKE 1 TABLET BY MOUTH TWICE A  Tab 3    acetaminophen (TYLENOL) 500 mg tablet Take  by mouth every six (6) hours as needed for Pain.  albuterol (PROVENTIL HFA, VENTOLIN HFA, PROAIR HFA) 90 mcg/actuation inhaler Take 1 Puff by inhalation every four (4) hours as needed for Wheezing. 1 Inhaler 0    multivitamin (ONE A DAY) tablet Take 1 Tab by mouth daily.  famotidine (PEPCID) 10 mg tablet Take 1 Tab by mouth two (2) times a day. 180 Tab 1     No current facility-administered medications on file prior to visit. Patient Active Problem List   Diagnosis Code    Essential hypertension with goal blood pressure less than 140/90 I10    Family history of colon cancer Z80.0    Overweight (BMI 25.0-29. 9) [id number]    Chronic midline low back pain without sciatica M54.5, G89.29    Reflux pharyngitis J02.9    YUDY (generalized anxiety disorder) F41.1    History of basal cell carcinoma Z85.828    Hypokalemia E87.6    Osteopenia of spine M85.88    Preventative health care Z00.00    Prediabetes R73.03    Cyst of left ovary N83.202    Nephrolithiasis N20.0    Bilateral renal cysts N28.1    Hepatic cyst K76.89    Pneumonia J18.9    CAP (community acquired pneumonia) J18.9       Review of Systems   Constitutional: Negative for chills and fever. HENT: Negative for congestion and sore throat. Respiratory: Negative for cough and shortness of breath. Cardiovascular: Negative for chest pain and palpitations. Gastrointestinal: Negative for diarrhea, nausea and vomiting. Musculoskeletal:        Leg cramp        Objective:     Visit Vitals  /67 (BP 1 Location: Right arm, BP Patient Position: Sitting)   Pulse 73   Temp 97.8 °F (36.6 °C) (Temporal)   Resp 20   Ht 5' 4\" (1.626 m)   Wt 161 lb (73 kg)   SpO2 97%   BMI 27.64 kg/m²        Physical Exam  Constitutional:       Appearance: She is well-developed. Neck:      Musculoskeletal: Normal range of motion and neck supple. Cardiovascular:      Rate and Rhythm: Normal rate and regular rhythm. Heart sounds: No murmur. No friction rub. Pulmonary:      Effort: Pulmonary effort is normal.      Breath sounds: Normal breath sounds. Musculoskeletal:      Comments: No LE edema or erythema    Skin:     General: Skin is warm and dry. Neurological:      Mental Status: She is alert and oriented to person, place, and time. Plan:     Leg cramp Ddx include likely dehydration (only intake of 30 ounces of water daily) vs K imbalance (pt on diuretic) vs other electrolyte abnormality. Less likely DVT as no erythema or swelling and POCUS done by me in clinic without a clot  - METABOLIC PANEL, BASIC; Future  - MAGNESIUM; Future  - CBC W/O DIFF; Future    I have discussed the diagnosis with the patient and the intended plan as seen in the above orders. Social history, medical history, and labs were reviewed.  The patient has received an after-visit summary and questions were answered concerning future plans. I have discussed medication side effects and warnings with the patient as well.     Hawk Patel DO  Resident [facility]  09/18/20    Case was discussed with Dr. Katherine Connolly (attending physician)

## 2020-09-28 ENCOUNTER — HOSPITAL ENCOUNTER (OUTPATIENT)
Dept: MAMMOGRAPHY | Age: 65
Discharge: HOME | End: 2020-09-28
Attending: FAMILY MEDICINE
Payer: MEDICARE

## 2020-09-28 DIAGNOSIS — Z12.31 VISIT FOR SCREENING MAMMOGRAM: ICD-10-CM

## 2020-09-28 PROCEDURE — 77063 BREAST TOMOSYNTHESIS BI: CPT

## 2021-03-14 RX ORDER — VENLAFAXINE HYDROCHLORIDE 37.5 MG/1
CAPSULE, EXTENDED RELEASE ORAL
Qty: 90 CAP | Refills: 1 | Status: SHIPPED | OUTPATIENT
Start: 2021-03-14 | End: 2021-10-09

## 2021-03-15 ENCOUNTER — TELEPHONE (OUTPATIENT)
Dept: FAMILY MEDICINE CLINIC | Age: 66
End: 2021-03-15

## 2021-03-15 NOTE — TELEPHONE ENCOUNTER
----- Message from Marcelina DURAND Duncanjos Dodge sent at 3/12/2021 11:14 AM EST -----  Regarding: The practice/telephone  Appointment not available    Caller's first and last name and relationship to patient (if not the patient):      Best contact number: [phone]      Preferred date and time:      Scheduled appointment date and time:      Reason for appointment: Medicare Wellness       Details to clarify the request: Patient would like to schedule a medicare wellness appointment and there are no available appointment to schedule for. She would like a call back to schedule in office only.        April [address]

## 2021-03-16 ENCOUNTER — VIRTUAL VISIT (OUTPATIENT)
Dept: FAMILY MEDICINE CLINIC | Age: 66
End: 2021-03-16
Payer: MEDICARE

## 2021-03-16 DIAGNOSIS — I10 ESSENTIAL HYPERTENSION: Primary | ICD-10-CM

## 2021-03-16 DIAGNOSIS — R73.03 PREDIABETES: ICD-10-CM

## 2021-03-16 DIAGNOSIS — F41.9 ANXIETY: ICD-10-CM

## 2021-03-16 PROCEDURE — G9899 SCRN MAM PERF RSLTS DOC: HCPCS | Performed by: STUDENT IN AN ORGANIZED HEALTH CARE EDUCATION/TRAINING PROGRAM

## 2021-03-16 PROCEDURE — 99214 OFFICE O/P EST MOD 30 MIN: CPT | Performed by: STUDENT IN AN ORGANIZED HEALTH CARE EDUCATION/TRAINING PROGRAM

## 2021-03-16 PROCEDURE — G8399 PT W/DXA RESULTS DOCUMENT: HCPCS | Performed by: STUDENT IN AN ORGANIZED HEALTH CARE EDUCATION/TRAINING PROGRAM

## 2021-03-16 PROCEDURE — G8432 DEP SCR NOT DOC, RNG: HCPCS | Performed by: STUDENT IN AN ORGANIZED HEALTH CARE EDUCATION/TRAINING PROGRAM

## 2021-03-16 PROCEDURE — G8427 DOCREV CUR MEDS BY ELIG CLIN: HCPCS | Performed by: STUDENT IN AN ORGANIZED HEALTH CARE EDUCATION/TRAINING PROGRAM

## 2021-03-16 PROCEDURE — G8756 NO BP MEASURE DOC: HCPCS | Performed by: STUDENT IN AN ORGANIZED HEALTH CARE EDUCATION/TRAINING PROGRAM

## 2021-03-16 PROCEDURE — 1101F PT FALLS ASSESS-DOCD LE1/YR: CPT | Performed by: STUDENT IN AN ORGANIZED HEALTH CARE EDUCATION/TRAINING PROGRAM

## 2021-03-16 PROCEDURE — 1090F PRES/ABSN URINE INCON ASSESS: CPT | Performed by: STUDENT IN AN ORGANIZED HEALTH CARE EDUCATION/TRAINING PROGRAM

## 2021-03-16 PROCEDURE — G0463 HOSPITAL OUTPT CLINIC VISIT: HCPCS | Performed by: STUDENT IN AN ORGANIZED HEALTH CARE EDUCATION/TRAINING PROGRAM

## 2021-03-16 PROCEDURE — 3017F COLORECTAL CA SCREEN DOC REV: CPT | Performed by: STUDENT IN AN ORGANIZED HEALTH CARE EDUCATION/TRAINING PROGRAM

## 2021-03-16 NOTE — PROGRESS NOTES
Malcom Colby  76 y.o. female  1955  [address] [address]  1 [facility]:    Telemedicine Progress Note  Syd Kim MD       Encounter Date and Time: March 16, 2021 at 1:19 PM    Consent:  She and/or the health care decision maker is aware that that she may receive a bill for this telephone service, depending on her insurance coverage, and has provided verbal consent to proceed: Yes    No chief complaint on file. History of Present Illness   Malcom Colby is a 76 y.o. female was evaluated by synchronous (real-time) audio-video technology from home, through [facility]:    1. HTN: No CP/SOB. No GI symptoms. /85. 2. Prediabetes. Diet controlled. Lab Results   Component Value Date/Time    Hemoglobin A1c 5.7 (H) 07/14/2020 12:02 PM   3. Anxiety. She tried to wean herself off of Effexor but noted significant emotional distress and she came close to a panic attack so she went back on the Effexor. Currently staying at the same dose of 37.5mg daily. Doing well not. No HI/AVH. Review of Systems   ROS    Vitals/Objective:     Patient-Reported Vitals 3/16/2021   Patient-Reported Weight 159   Patient-Reported Height 5'4\"   Patient-Reported Pulse 80   Patient-Reported Temperature 98.2   Patient-Reported Systolic  018   Patient-Reported Diastolic 85      General: alert, cooperative, no distress   Mental  status: mental status: alert, oriented to person, place, and time, normal mood, behavior, speech, dress, motor activity, and thought processes   Resp: resp: normal effort and no respiratory distress   Neuro: neuro: no gross deficits   Skin: skin: no discoloration or lesions of concern on visible areas   Due to this being a TeleHealth evaluation, many elements of the physical examination are unable to be assessed.     Assessment and Plan:   Time spent in direct conversation with the patient to include medical condition(s) discussed, assessment and treatment plan:    1. Essential hypertension  BP controlled today on HCTZ and Amlodipine. Continue regimen. Last Cr normal in 9/2020. On potassium supplement also. Last K 3.7  Lab Results   Component Value Date/Time    Sodium 137 09/18/2020 11:17 AM    Potassium 3.7 09/18/2020 11:17 AM    Chloride 101 09/18/2020 11:17 AM    CO2 32 09/18/2020 11:17 AM    Anion gap 4 (L) 09/18/2020 11:17 AM    Glucose 89 09/18/2020 11:17 AM    BUN 16 09/18/2020 11:17 AM    Creatinine 0.84 09/18/2020 11:17 AM    BUN/Creatinine ratio 19 09/18/2020 11:17 AM    GFR est AA >60 09/18/2020 11:17 AM    GFR est non-AA >60 09/18/2020 11:17 AM    Calcium 9.9 09/18/2020 11:17 AM     2. Anxiety  Improved on Effexor. Continue meds. Rx sent on 3/14/21 already. 3. Prediabetes  Lab Results   Component Value Date/Time    Hemoglobin A1c 5.7 (H) 07/14/2020 12:02 PM   Will need a recheck in a few months. Continue diet control. We discussed the expected course, resolution and complications of the diagnosis(es) in detail. Medication risks, benefits, costs, interactions, and alternatives were discussed as indicated. I advised her to contact the office if her condition worsens, changes or fails to improve as anticipated. She expressed understanding with the diagnosis(es) and plan. Patient understands that this encounter was a temporary measure, and the importance of further follow up and examination was emphasized. Patient verbalized understanding. Patient informed to follow up: Medicare wellness. Electronically Signed: Lexi Thompson MD    Providers location when delivering service (clinic, hospital, home): home    CPT Codes 99267-84004 for Established Patients may apply to this Telehealth Visit. POS code: 18.   Modifier GT      Pursuant to the emergency declaration under the [address] [place], [facility] waiver authority and the [employer] [facility] [employer], this Virtual  Visit was conducted, with patient's consent, to reduce the patient's risk of exposure to COVID-19 and provide continuity of care for an established patient. Services were provided through a video synchronous discussion virtually to substitute for in-person clinic visit. History   Patients past medical, surgical and family histories were reviewed and updated. Past Medical History:   Diagnosis Date    Asthma     Hypertension     Pneumonia 2017     Past Surgical History:   Procedure Laterality Date    HX  SECTION       Family History   Problem Relation Age of Onset    Breast Cancer Mother 59    Hypertension Father     Cancer Sister     Colon Cancer Sister     Psychiatric Disorder Brother         schizophrenia    Cancer Other     Colon Cancer Other      Social History     Socioeconomic History    Marital status:      Spouse name: Not on file    Number of children: Not on file    Years of education: Not on file    Highest education level: Not on file   Occupational History    Not on file   Social Needs    Financial resource strain: Not on file    Food insecurity     Worry: Not on file     Inability: Not on file    Transportation needs     Medical: Not on file     Non-medical: Not on file   Tobacco Use    Smoking status: Never Smoker    Smokeless tobacco: Never Used   Substance and Sexual Activity    Alcohol use:  Yes     Alcohol/week: 1.0 standard drinks     Types: 1 Glasses of wine per week     Comment: rarely    Drug use: No    Sexual activity: Never   Lifestyle    Physical activity     Days per week: Not on file     Minutes per session: Not on file    Stress: Not on file   Relationships    Social connections     Talks on phone: Not on file     Gets together: Not on file     Attends [employer] service: Not on file     Active member of club or organization: Not on file     Attends meetings of clubs or organizations: Not on file     Relationship status: Not on file    Intimate partner violence     Fear of current or ex partner: Not on file     Emotionally abused: Not on file     Physically abused: Not on file     Forced sexual activity: Not on file   Other Topics Concern    Not on file   Social History Narrative    Lives with     Daughter Ileana Langley  2014 from colon cancer soon after having his son    Works at home but still works in [employer] 2 days per week,      Patient Active Problem List   Diagnosis Code    Essential hypertension with goal blood pressure less than 140/90 I10    Family history of colon cancer Z80.0    Overweight (BMI 25.0-29. 9) E66.3    Chronic midline low back pain without sciatica M54.5, G89.29    Reflux pharyngitis J02.9    YUDY (generalized anxiety disorder) F41.1    History of basal cell carcinoma Z85.828    Hypokalemia E87.6    Osteopenia of spine M85.88    Preventative health care Z00.00    Prediabetes R73.03    Cyst of left ovary N83.202    Nephrolithiasis N20.0    Bilateral renal cysts N28.1    Hepatic cyst K76.89    Pneumonia J18.9    CAP (community acquired pneumonia) J18.9          Current Medications/Allergies   Medications and Allergies reviewed:    Current Outpatient Medications   Medication Sig Dispense Refill    venlafaxine-SR (EFFEXOR-XR) 37.5 mg capsule TAKE 1 CAPSULE BY MOUTH EVERY DAY 90 Cap 1    famotidine (PEPCID) 10 mg tablet Take 1 Tab by mouth two (2) times a day. 180 Tab 1    hydroCHLOROthiazide (HYDRODIURIL) 25 mg tablet TAKE 1 TABLET BY MOUTH EVERY DAY 90 Tab 3    amLODIPine (NORVASC) 5 mg tablet TAKE 1 TABLET BY MOUTH EVERY DAY 90 Tab 3    potassium chloride SR (KLOR-CON 10) 10 mEq tablet TAKE 1 TABLET BY MOUTH TWICE A  Tab 3    acetaminophen (TYLENOL) 500 mg tablet Take  by mouth every six (6) hours as needed for Pain.       albuterol (PROVENTIL HFA, VENTOLIN HFA, PROAIR HFA) 90 mcg/actuation inhaler Take 1 Puff by inhalation every four (4) hours as needed for Wheezing. 1 Inhaler 0    multivitamin (ONE A DAY) tablet Take 1 Tab by mouth daily.        Allergies   Allergen Reactions    Erythromycin Nausea Only

## 2021-03-21 ENCOUNTER — PATIENT MESSAGE (OUTPATIENT)
Dept: FAMILY MEDICINE CLINIC | Age: 66
End: 2021-03-21

## 2021-03-22 NOTE — TELEPHONE ENCOUNTER
Patient reports to have hives on thighs, knees, ankles and a little on the back. The only \"new\" exposure was the new puppy. She tried OTC Claritin and cortisone cream and the rash didn't resolve. She went to Urgent Care on Saturday and the rash is almost completely gone now with her medications. No breathing issues     She does have allergies to cats and dogs. Cats - triggers her asthma (has rescue inhaler) and dogs- sometimes have red/watery eyes. She had Allergist as a child and she got allergy shot. Discussed with patient that given that she had allergic reaction, better to finish treatment and then get COVID vaccine in 2 weeks.      Sherley Murillo MD

## 2021-04-15 NOTE — PROGRESS NOTES
[id number]  [address] [place] [address] Coni Salazar   [phone]             Date of visit: 4/16/2021       This is an Initial Medicare Annual Wellness Exam (AWV) (Performed 12 months after IPPE or effective date of Medicare Part B enrollment, Once in a lifetime)    I have reviewed the patient's medical history in detail and updated the computerized patient record. Assessment/Plan   Education and counseling provided:  Are appropriate based on today's review and evaluation    1. Medicare annual wellness visit, initial  -     ZOSTER VACC RECOMBINANT ADJUVANTED  -     [place] IMMUNIZ ADMIN,1 SINGLE/COMB VAC/TOXOID  -     FULL CODE  2. Initial Medicare annual wellness visit  3. Advanced directives, counseling/discussion     We will wait to perform Shingrix until patient is S/P COVID vaccine which she will be receiving next week     Pt has had blood work in the past year except for lipid panel, she will have all bloodwork performed together when she is due    Depression Risk Factor Screening     3 most recent PHQ Screens 4/16/2021   Little interest or pleasure in doing things Not at all   Feeling down, depressed, irritable, or hopeless Not at all   Total Score PHQ 2 0       Alcohol Risk Screen    Do you average more than 1 drink per night or more than 7 drinks a week:  No    On any one occasion in the past three months have you have had more than 3 drinks containing alcohol:  No    Functional Ability and Level of Safety    Hearing: Hearing is good. Activities of Daily Living: The home contains: handrails, grab bars and rugs  Patient does total self care     Ambulation: with no difficulty      Fall Risk:  Fall Risk Assessment, last 12 mths 4/16/2021   Able to walk? Yes   Fall in past 12 months? 0   Do you feel unsteady?  0   Are you worried about falling 0      Abuse Screen:  Patient is not abused       Cognitive Screening    Has your family/caregiver stated any concerns about your memory: no     Health Maintenance Due     Health Maintenance Due   Topic Date Due    COVID-19 Vaccine (1) Never done    Shingrix Vaccine Age 49> (1 of 2) Never done       Patient Care Team   Patient Care Team:  Jose Murcia MD as PCP - General (Family Medicine)  Jose Murcia MD as PCP - [facility]    History     Patient Active Problem List   Diagnosis Code    Essential hypertension with goal blood pressure less than 140/90 I10    Family history of colon cancer Z80.0    Overweight (BMI 25.0-29. 9) E66.3    Chronic midline low back pain without sciatica M54.5, G89.29    Reflux pharyngitis J02.9    YUDY (generalized anxiety disorder) F41.1    History of basal cell carcinoma Z85.828    Hypokalemia E87.6    Osteopenia of spine M85.88    Preventative health care Z00.00    Prediabetes R73.03    Cyst of left ovary N83.202    Nephrolithiasis N20.0    Bilateral renal cysts N28.1    Hepatic cyst K76.89    Pneumonia J18.9    CAP (community acquired pneumonia) J18.9     Past Medical History:   Diagnosis Date    Asthma     Hypertension     Pneumonia 2017      Past Surgical History:   Procedure Laterality Date    HX  SECTION       Current Outpatient Medications   Medication Sig Dispense Refill    venlafaxine-SR (EFFEXOR-XR) 37.5 mg capsule TAKE 1 CAPSULE BY MOUTH EVERY DAY 90 Cap 1    hydroCHLOROthiazide (HYDRODIURIL) 25 mg tablet TAKE 1 TABLET BY MOUTH EVERY DAY 90 Tab 3    amLODIPine (NORVASC) 5 mg tablet TAKE 1 TABLET BY MOUTH EVERY DAY 90 Tab 3    potassium chloride SR (KLOR-CON 10) 10 mEq tablet TAKE 1 TABLET BY MOUTH TWICE A  Tab 3    multivitamin (ONE A DAY) tablet Take 1 Tab by mouth daily.  acetaminophen (TYLENOL) 500 mg tablet Take  by mouth every six (6) hours as needed for Pain.       albuterol (PROVENTIL HFA, VENTOLIN HFA, PROAIR HFA) 90 mcg/actuation inhaler Take 1 Puff by inhalation every four (4) hours as needed for Wheezing. 1 Inhaler 0     Allergies   Allergen Reactions    Other Plant, Animal, Environmental Shortness of Breath     CATS    Erythromycin Nausea Only       Family History   Problem Relation Age of Onset    Breast Cancer Mother 59    Hypertension Father     Cancer Sister     Colon Cancer Sister     Psychiatric Disorder Brother         schizophrenia    Cancer Other     Colon Cancer Other      Social History     Tobacco Use    Smoking status: Never Smoker    Smokeless tobacco: Never Used   Substance Use Topics    Alcohol use:  Yes     Alcohol/week: 1.0 standard drinks     Types: 1 Glasses of wine per week     Comment: rarely       Shelli Hodgkin, DO

## 2021-04-16 ENCOUNTER — OFFICE VISIT (OUTPATIENT)
Dept: FAMILY MEDICINE CLINIC | Age: 66
End: 2021-04-16
Payer: MEDICARE

## 2021-04-16 VITALS
DIASTOLIC BLOOD PRESSURE: 68 MMHG | HEIGHT: 64 IN | HEART RATE: 82 BPM | RESPIRATION RATE: 16 BRPM | OXYGEN SATURATION: 98 % | BODY MASS INDEX: 26.8 KG/M2 | WEIGHT: 157 LBS | TEMPERATURE: 97.6 F | SYSTOLIC BLOOD PRESSURE: 114 MMHG

## 2021-04-16 DIAGNOSIS — Z71.89 ADVANCED DIRECTIVES, COUNSELING/DISCUSSION: ICD-10-CM

## 2021-04-16 DIAGNOSIS — Z78.9 FULL CODE STATUS: ICD-10-CM

## 2021-04-16 DIAGNOSIS — Z00.00 MEDICARE ANNUAL WELLNESS VISIT, INITIAL: Primary | ICD-10-CM

## 2021-04-16 PROCEDURE — G0402 INITIAL PREVENTIVE EXAM: HCPCS | Performed by: STUDENT IN AN ORGANIZED HEALTH CARE EDUCATION/TRAINING PROGRAM

## 2021-04-16 PROCEDURE — G8427 DOCREV CUR MEDS BY ELIG CLIN: HCPCS | Performed by: STUDENT IN AN ORGANIZED HEALTH CARE EDUCATION/TRAINING PROGRAM

## 2021-04-16 PROCEDURE — G8419 CALC BMI OUT NRM PARAM NOF/U: HCPCS | Performed by: STUDENT IN AN ORGANIZED HEALTH CARE EDUCATION/TRAINING PROGRAM

## 2021-04-16 PROCEDURE — G8432 DEP SCR NOT DOC, RNG: HCPCS | Performed by: STUDENT IN AN ORGANIZED HEALTH CARE EDUCATION/TRAINING PROGRAM

## 2021-04-16 PROCEDURE — G8536 NO DOC ELDER MAL SCRN: HCPCS | Performed by: STUDENT IN AN ORGANIZED HEALTH CARE EDUCATION/TRAINING PROGRAM

## 2021-04-16 PROCEDURE — 1090F PRES/ABSN URINE INCON ASSESS: CPT | Performed by: STUDENT IN AN ORGANIZED HEALTH CARE EDUCATION/TRAINING PROGRAM

## 2021-04-16 NOTE — PATIENT INSTRUCTIONS

## 2021-04-16 NOTE — PROGRESS NOTES
Grazyna Seth is a 76 y.o. female    Chief Complaint   Patient presents with   [facility] Annual Wellness Visit     Patient is coming in for medicare wellnes exam. Patient has not had a shigles vaccine yet. No other concerns. 1. Have you been to the ER, urgent care clinic since your last visit? Hospitalized since your last visit? No  M  2. Have you seen or consulted any other health care providers outside of the [address] [place] [address] since your last visit? Include any pap smears or colon screening. No      Visit Vitals  /68 (BP 1 Location: Right upper arm, BP Patient Position: Sitting)   Pulse 82   Temp 97.6 °F (36.4 °C) (Temporal)   Resp 16   Ht 5' 4\" (1.626 m)   Wt 157 lb (71.2 kg)   SpO2 98%   BMI 26.95 kg/m²           Health Maintenance Due   Topic Date Due    COVID-19 Vaccine (1) Never done    Shingrix Vaccine Age 50> (1 of 2) Never done    Medicare Yearly Exam  Never done         Medication Reconciliation completed, changes noted. Please  Update medication list.      General Health Questions   -During the past 4 weeks:   -how would you rate your health in general? Good   -how often have you been bothered by feeling dizzy when standing up? -how much have you been bothered by bodily pain? not   -Have you noticed any hearing difficulties? no   -has your physical and emotional health limited your social activities with family or friends? no    Emotional Health Questions   -Do you have a history of depression, anxiety, or emotional problems? no  -Over the past 2 weeks, have you felt down, depressed or hopeless? no  -Over the past 2 weeks, have you felt little interest or pleasure in doing things? no    Health Habits   Please describe your diet habits: 2- 3 meals a day: yogurt for snacks, focusing on drinking water  Do you get 5 servings of fruits or vegetables daily? No  Do you exercise regularly?  Yes    Activities of Daily Living and Functional Status   -Do you need help with eating, walking, dressing, bathing, toileting, the phone, transportation, shopping, preparing meals, housework, laundry, medications or managing money? No   -In the past four weeks, was someone available to help you if you needed and wanted help with anything? Yes  -Are you confident are you that you can control and manage most of your health problems? yes  -Have you been given information to help you keep track of your medications? Yes  -How often do you have trouble taking your medications as prescribed? never    Fall Risk and Home Safety   Have you fallen 2 or more times in the past year? no  Does your home have rugs in the hallways? no, Do you have grab bars in the bathrooms? yes, Does your home have handrails on the stairs? yes, Do you have adequate lighting in your home? yes  Do you have smoke detectors and check them regularly? yes  Do you have difficulties driving a car/vehicle?  No  Do you always fasten your seat belt when you are in a car? yes

## 2021-04-16 NOTE — ACP (ADVANCE CARE PLANNING)
Advance Care Planning     General Advance Care Planning (ACP) Conversation      Date of Conversation: 4/16/2021  Conducted with: Patient with Decision Making Capacity    Healthcare Decision Maker:     Click here to complete [employer] including selection of the [employer] Relationship (ie \"Primary\")  Today we documented Decision Maker(s) consistent with Legal Next of Kin hierarchy. Content/Action Overview: Has ACP document(s) on file - reflects the patient's care preferences  Reviewed DNR/DNI and patient elects Full Code (Attempt Resuscitation)  Topics discussed: treatment goals, ventilation preferences, hospitalization preferences and resuscitation preferences  Additional Comments: Has an AD in place; will bring copy to be scanned into chart.       Length of Voluntary ACP Conversation in minutes:  <16 minutes (Non-Billable)    Vonnie Castleman, DO

## 2021-06-09 DIAGNOSIS — I10 ESSENTIAL HYPERTENSION: ICD-10-CM

## 2021-06-09 RX ORDER — AMLODIPINE BESYLATE 5 MG/1
TABLET ORAL
Qty: 90 TABLET | Refills: 0 | Status: SHIPPED | OUTPATIENT
Start: 2021-06-09 | End: 2021-09-20

## 2021-06-09 RX ORDER — HYDROCHLOROTHIAZIDE 25 MG/1
TABLET ORAL
Qty: 90 TABLET | Refills: 0 | Status: SHIPPED | OUTPATIENT
Start: 2021-06-09 | End: 2021-09-20

## 2021-06-15 NOTE — PROGRESS NOTES
Bedside and Verbal shift change report given to Suki Harris (oncoming nurse) by Kary Guevara RN (offgoing nurse). Report included the following information SBAR, Kardex, ED Summary and Recent Results. · Multifactorial with poor oral intake secondary to abdominal pain as well as hyperglycemia contributing   · Sodium 130 today but corrects to 135   · Continue IVF until improvement in oral intake   · Monitor BMP

## 2021-06-17 RX ORDER — POTASSIUM CHLORIDE 750 MG/1
TABLET, FILM COATED, EXTENDED RELEASE ORAL
Qty: 180 TABLET | Refills: 0 | Status: SHIPPED | OUTPATIENT
Start: 2021-06-17 | End: 2021-09-20

## 2021-07-13 ENCOUNTER — CLINICAL SUPPORT (OUTPATIENT)
Dept: FAMILY MEDICINE CLINIC | Age: 66
End: 2021-07-13
Payer: MEDICARE

## 2021-07-13 VITALS — DIASTOLIC BLOOD PRESSURE: 74 MMHG | TEMPERATURE: 98 F | SYSTOLIC BLOOD PRESSURE: 120 MMHG

## 2021-07-13 DIAGNOSIS — Z23 ENCOUNTER FOR IMMUNIZATION: Primary | ICD-10-CM

## 2021-07-13 PROCEDURE — 90750 HZV VACC RECOMBINANT IM: CPT | Performed by: FAMILY MEDICINE

## 2021-07-13 PROCEDURE — 90471 IMMUNIZATION ADMIN: CPT | Performed by: FAMILY MEDICINE

## 2021-07-13 NOTE — PROGRESS NOTES
After obtaining consent, and per orders of Dr. Preston Castaneda, injection of Shingrix was given to patient in L deltoid, given by Marcelina Arthur LPN. Patient instructed to remain in clinic for 20 minutes afterwards, and to report any adverse reaction to me immediately.

## 2021-09-18 DIAGNOSIS — I10 ESSENTIAL HYPERTENSION: ICD-10-CM

## 2021-09-20 RX ORDER — HYDROCHLOROTHIAZIDE 25 MG/1
TABLET ORAL
Qty: 90 TABLET | Refills: 0 | Status: SHIPPED | OUTPATIENT
Start: 2021-09-20 | End: 2021-11-11 | Stop reason: SDUPTHER

## 2021-09-20 RX ORDER — AMLODIPINE BESYLATE 5 MG/1
TABLET ORAL
Qty: 90 TABLET | Refills: 0 | Status: SHIPPED | OUTPATIENT
Start: 2021-09-20 | End: 2021-11-11 | Stop reason: SDUPTHER

## 2021-09-20 RX ORDER — POTASSIUM CHLORIDE 750 MG/1
TABLET, FILM COATED, EXTENDED RELEASE ORAL
Qty: 180 TABLET | Refills: 0 | Status: SHIPPED | OUTPATIENT
Start: 2021-09-20 | End: 2021-11-11 | Stop reason: SDUPTHER

## 2021-10-20 ENCOUNTER — TELEPHONE (OUTPATIENT)
Dept: FAMILY MEDICINE CLINIC | Age: 66
End: 2021-10-20

## 2021-10-20 NOTE — TELEPHONE ENCOUNTER
----- Message from Eleno Hawthorne MD sent at 10/9/2021  3:27 PM EDT -----  Please schedule an appointment for follow up for medication refills. Thanks.

## 2021-10-22 ENCOUNTER — TRANSCRIBE ORDER (OUTPATIENT)
Dept: SCHEDULING | Age: 66
End: 2021-10-22

## 2021-10-22 DIAGNOSIS — Z12.31 VISIT FOR SCREENING MAMMOGRAM: Primary | ICD-10-CM

## 2021-10-25 ENCOUNTER — HOSPITAL ENCOUNTER (OUTPATIENT)
Dept: MAMMOGRAPHY | Age: 66
Discharge: HOME | End: 2021-10-25
Attending: FAMILY MEDICINE
Payer: MEDICARE

## 2021-10-25 DIAGNOSIS — Z12.31 VISIT FOR SCREENING MAMMOGRAM: ICD-10-CM

## 2021-10-25 PROCEDURE — 77063 BREAST TOMOSYNTHESIS BI: CPT

## 2021-11-11 ENCOUNTER — OFFICE VISIT (OUTPATIENT)
Dept: FAMILY MEDICINE CLINIC | Age: 66
End: 2021-11-11
Payer: MEDICARE

## 2021-11-11 VITALS
OXYGEN SATURATION: 96 % | SYSTOLIC BLOOD PRESSURE: 112 MMHG | TEMPERATURE: 98.3 F | BODY MASS INDEX: 27.31 KG/M2 | WEIGHT: 160 LBS | RESPIRATION RATE: 18 BRPM | HEART RATE: 90 BPM | HEIGHT: 64 IN | DIASTOLIC BLOOD PRESSURE: 71 MMHG

## 2021-11-11 DIAGNOSIS — R73.03 PREDIABETES: ICD-10-CM

## 2021-11-11 DIAGNOSIS — F41.9 ANXIETY: ICD-10-CM

## 2021-11-11 DIAGNOSIS — Z23 ENCOUNTER FOR IMMUNIZATION: ICD-10-CM

## 2021-11-11 DIAGNOSIS — I10 ESSENTIAL HYPERTENSION: Primary | ICD-10-CM

## 2021-11-11 LAB
ANION GAP SERPL CALC-SCNC: 4 MMOL/L (ref 5–15)
BUN SERPL-MCNC: 17 MG/DL (ref 6–20)
BUN/CREAT SERPL: 24 (ref 12–20)
CALCIUM SERPL-MCNC: 10.3 MG/DL (ref 8.5–10.1)
CHLORIDE SERPL-SCNC: 103 MMOL/L (ref 97–108)
CHOLEST SERPL-MCNC: 220 MG/DL
CO2 SERPL-SCNC: 30 MMOL/L (ref 21–32)
CREAT SERPL-MCNC: 0.72 MG/DL (ref 0.55–1.02)
EST. AVERAGE GLUCOSE BLD GHB EST-MCNC: 114 MG/DL
GLUCOSE SERPL-MCNC: 132 MG/DL (ref 65–100)
HBA1C MFR BLD: 5.6 % (ref 4–5.6)
HDLC SERPL-MCNC: 106 MG/DL
HDLC SERPL: 2.1 {RATIO} (ref 0–5)
LDLC SERPL CALC-MCNC: 100.4 MG/DL (ref 0–100)
POTASSIUM SERPL-SCNC: 4.5 MMOL/L (ref 3.5–5.1)
SODIUM SERPL-SCNC: 137 MMOL/L (ref 136–145)
TRIGL SERPL-MCNC: 68 MG/DL (ref ?–150)
VLDLC SERPL CALC-MCNC: 13.6 MG/DL

## 2021-11-11 PROCEDURE — G8427 DOCREV CUR MEDS BY ELIG CLIN: HCPCS | Performed by: STUDENT IN AN ORGANIZED HEALTH CARE EDUCATION/TRAINING PROGRAM

## 2021-11-11 PROCEDURE — G8419 CALC BMI OUT NRM PARAM NOF/U: HCPCS | Performed by: STUDENT IN AN ORGANIZED HEALTH CARE EDUCATION/TRAINING PROGRAM

## 2021-11-11 PROCEDURE — G9899 SCRN MAM PERF RSLTS DOC: HCPCS | Performed by: STUDENT IN AN ORGANIZED HEALTH CARE EDUCATION/TRAINING PROGRAM

## 2021-11-11 PROCEDURE — 90471 IMMUNIZATION ADMIN: CPT | Performed by: STUDENT IN AN ORGANIZED HEALTH CARE EDUCATION/TRAINING PROGRAM

## 2021-11-11 PROCEDURE — 90750 HZV VACC RECOMBINANT IM: CPT | Performed by: STUDENT IN AN ORGANIZED HEALTH CARE EDUCATION/TRAINING PROGRAM

## 2021-11-11 PROCEDURE — G8754 DIAS BP LESS 90: HCPCS | Performed by: STUDENT IN AN ORGANIZED HEALTH CARE EDUCATION/TRAINING PROGRAM

## 2021-11-11 PROCEDURE — G8432 DEP SCR NOT DOC, RNG: HCPCS | Performed by: STUDENT IN AN ORGANIZED HEALTH CARE EDUCATION/TRAINING PROGRAM

## 2021-11-11 PROCEDURE — G0463 HOSPITAL OUTPT CLINIC VISIT: HCPCS | Performed by: STUDENT IN AN ORGANIZED HEALTH CARE EDUCATION/TRAINING PROGRAM

## 2021-11-11 PROCEDURE — 3017F COLORECTAL CA SCREEN DOC REV: CPT | Performed by: STUDENT IN AN ORGANIZED HEALTH CARE EDUCATION/TRAINING PROGRAM

## 2021-11-11 PROCEDURE — 99214 OFFICE O/P EST MOD 30 MIN: CPT | Performed by: STUDENT IN AN ORGANIZED HEALTH CARE EDUCATION/TRAINING PROGRAM

## 2021-11-11 PROCEDURE — G8752 SYS BP LESS 140: HCPCS | Performed by: STUDENT IN AN ORGANIZED HEALTH CARE EDUCATION/TRAINING PROGRAM

## 2021-11-11 PROCEDURE — G8536 NO DOC ELDER MAL SCRN: HCPCS | Performed by: STUDENT IN AN ORGANIZED HEALTH CARE EDUCATION/TRAINING PROGRAM

## 2021-11-11 PROCEDURE — G8399 PT W/DXA RESULTS DOCUMENT: HCPCS | Performed by: STUDENT IN AN ORGANIZED HEALTH CARE EDUCATION/TRAINING PROGRAM

## 2021-11-11 PROCEDURE — 1101F PT FALLS ASSESS-DOCD LE1/YR: CPT | Performed by: STUDENT IN AN ORGANIZED HEALTH CARE EDUCATION/TRAINING PROGRAM

## 2021-11-11 PROCEDURE — 1090F PRES/ABSN URINE INCON ASSESS: CPT | Performed by: STUDENT IN AN ORGANIZED HEALTH CARE EDUCATION/TRAINING PROGRAM

## 2021-11-11 RX ORDER — VENLAFAXINE HYDROCHLORIDE 37.5 MG/1
CAPSULE, EXTENDED RELEASE ORAL
Qty: 30 CAPSULE | Refills: 0 | Status: SHIPPED | OUTPATIENT
Start: 2021-11-11 | End: 2021-12-02 | Stop reason: SDUPTHER

## 2021-11-11 RX ORDER — HYDROCHLOROTHIAZIDE 25 MG/1
25 TABLET ORAL DAILY
Qty: 90 TABLET | Refills: 0 | Status: SHIPPED | OUTPATIENT
Start: 2021-11-11 | End: 2022-03-30

## 2021-11-11 RX ORDER — POTASSIUM CHLORIDE 750 MG/1
10 TABLET, FILM COATED, EXTENDED RELEASE ORAL 2 TIMES DAILY
Qty: 180 TABLET | Refills: 0 | Status: SHIPPED | OUTPATIENT
Start: 2021-11-11 | End: 2022-01-04

## 2021-11-11 RX ORDER — AMLODIPINE BESYLATE 5 MG/1
5 TABLET ORAL DAILY
Qty: 90 TABLET | Refills: 0 | Status: SHIPPED | OUTPATIENT
Start: 2021-11-11 | End: 2022-03-30

## 2021-11-11 NOTE — PROGRESS NOTES
Chief Complaint:     Chief Complaint   Patient presents with    Follow Up Chronic Condition       Trent Hills is a 78 y.o. female that presents for: med check      Assessment/Plan:   I personally reviewed the following Pertinent Labs/Studies:     Diagnoses and all orders for this visit:    1. Encounter for immunization  -     ZOSTER VACC RECOMBINANT ADJUVANTED  -     [place] IMMUNIZ ADMIN,1 SINGLE/COMB VAC/TOXOID    2. Essential hypertension: controlled today  -     amLODIPine (NORVASC) 5 mg tablet; Take 1 Tablet by mouth daily. -     hydroCHLOROthiazide (HYDRODIURIL) 25 mg tablet; Take 1 Tablet by mouth daily. -     potassium chloride SR (KLOR-CON 10) 10 mEq tablet; Take 1 Tablet by mouth two (2) times a day. -     METABOLIC PANEL, BASIC; Future    3. Anxiety  -     venlafaxine-SR (EFFEXOR-XR) 37.5 mg capsule; TAKE ONE CAPSULE BY MOUTH ONE TIME DAILY    4. Prediabetes  -     HEMOGLOBIN A1C WITH EAG; Future  -     LIPID PANEL; Future      Follow up:      1 year for chronic conditions    Subjective:   HPI:  Trent Hills is a 78 y.o. female that presents for:    Chronic condition follow up. Denies HA, change in vision, chest pain, sob, or leg swelling. Anxiety is well controlled. No complaints today. Health Maintenance:  Health Maintenance Due   Topic Date Due    A1C test (Diabetic or Prediabetic)  07/14/2021    Shingrix Vaccine Age 50> (2 of 2) 09/07/2021        ROS:   Review of Systems   Constitutional: Negative for chills, fever, malaise/fatigue and weight loss. HENT: Negative for congestion, hearing loss, sinus pain and sore throat. Eyes: Negative for blurred vision, double vision and pain. Respiratory: Negative for cough, sputum production, shortness of breath and wheezing. Cardiovascular: Negative for chest pain, palpitations and leg swelling. Gastrointestinal: Negative for abdominal pain, heartburn, nausea and vomiting. Genitourinary: Negative for dysuria and urgency. Musculoskeletal: Negative for back pain, myalgias and neck pain. Skin: Negative for rash. Neurological: Negative for dizziness, weakness and headaches. Endo/Heme/Allergies: Does not bruise/bleed easily. Psychiatric/Behavioral: Negative for depression and suicidal ideas. Past medical history, social history, and medications personally reviewed. Past Medical History:   Diagnosis Date    Asthma     Hypertension     Pneumonia 09/21/2017        Allergies personally reviewed. Allergies   Allergen Reactions    Other Plant, Animal, Environmental Shortness of Breath     CATS    Erythromycin Nausea Only          Objective:   Vitals reviewed. Visit Vitals  /71 (BP 1 Location: Right arm, BP Patient Position: Sitting, BP Cuff Size: Adult)   Pulse 90   Temp 98.3 °F (36.8 °C) (Oral)   Resp 18   Ht 5' 4\" (1.626 m)   Wt 160 lb (72.6 kg)   SpO2 96%   BMI 27.46 kg/m²        Physical Exam  Physical Exam     Vitals Reviewed. General AO x 3. No distress. Not diaphoretic. No jaundice. No cyanosis. No pallor. Neck No thyromegaly present. No JVD. No cervical adenopathy. Cardio Normal rate, regular rhythm. No murmur, rubs, or gallop. Pulmonary Effort normal. No accessory muscle use. No wheezes, rales, or rhonchi. Abdominal Soft. Bowel sounds normal. No tenderness. No distension. Extremities No edema of lower extremities. Pulses 2+. Neurological CN II-XII grossly intact. No focal deficits. Skin No rash. Pt was discussed with Dr David Gallegos (attending physician). I have reviewed pertinent labs results and other data. I have discussed the diagnosis with the patient and the intended plan as seen in the above orders. The patient has received an after-visit summary and questions were answered concerning future plans. I have discussed medication side effects and warnings with the patient as well.     DO ÓSCAR  Resident [address] [place] [address]  11/11/21

## 2021-11-11 NOTE — PATIENT INSTRUCTIONS
High Blood Pressure: Care Instructions  Overview     It's normal for blood pressure to go up and down throughout the day. But if it stays up, you have high blood pressure. Another name for high blood pressure is hypertension. Despite what a lot of people think, high blood pressure usually doesn't cause headaches or make you feel dizzy or lightheaded. It usually has no symptoms. But it does increase your risk of stroke, heart attack, and other problems. You and your doctor will talk about your risks of these problems based on your blood pressure. Your doctor will give you a goal for your blood pressure. Your goal will be based on your health and your age. Lifestyle changes, such as eating healthy and being active, are always important to help lower blood pressure. You might also take medicine to reach your blood pressure goal.  Follow-up care is a key part of your treatment and safety. Be sure to make and go to all appointments, and call your doctor if you are having problems. It's also a good idea to know your test results and keep a list of the medicines you take. How can you care for yourself at home? Medical treatment  · If you stop taking your medicine, your blood pressure will go back up. You may take one or more types of medicine to lower your blood pressure. Be safe with medicines. Take your medicine exactly as prescribed. Call your doctor if you think you are having a problem with your medicine. · Talk to your doctor before you start taking aspirin every day. Aspirin can help certain people lower their risk of a heart attack or stroke. But taking aspirin isn't right for everyone, because it can cause serious bleeding. · See your doctor regularly. You may need to see the doctor more often at first or until your blood pressure comes down. · If you are taking blood pressure medicine, talk to your doctor before you take decongestants or anti-inflammatory medicine, such as ibuprofen.  Some of these medicines can raise blood pressure. · Learn how to check your blood pressure at home. Lifestyle changes  · Stay at a healthy weight. This is especially important if you put on weight around the waist. Losing even 10 pounds can help you lower your blood pressure. · If your doctor recommends it, get more exercise. Walking is a good choice. Bit by bit, increase the amount you walk every day. Try for at least 30 minutes on most days of the week. You also may want to swim, bike, or do other activities. · Avoid or limit alcohol. Talk to your doctor about whether you can drink any alcohol. · Try to limit how much sodium you eat to less than 2,300 milligrams (mg) a day. Your doctor may ask you to try to eat less than 1,500 mg a day. · Eat plenty of fruits (such as bananas and oranges), vegetables, legumes, whole grains, and low-fat dairy products. · Lower the amount of saturated fat in your diet. Saturated fat is found in animal products such as milk, cheese, and meat. Limiting these foods may help you lose weight and also lower your risk for heart disease. · Do not smoke. Smoking increases your risk for heart attack and stroke. If you need help quitting, talk to your doctor about stop-smoking programs and medicines. These can increase your chances of quitting for good. When should you call for help? Call 911  anytime you think you may need emergency care. This may mean having symptoms that suggest that your blood pressure is causing a serious heart or blood vessel problem. Your blood pressure may be over 180/120. For example, call 911 if:    · You have symptoms of a heart attack. These may include:  ? Chest pain or pressure, or a strange feeling in the chest.  ? Sweating. ? Shortness of breath. ? Nausea or vomiting. ? Pain, pressure, or a strange feeling in the back, neck, jaw, or upper belly or in one or both shoulders or arms. ? Lightheadedness or sudden weakness.   ? A fast or irregular heartbeat.     · You have symptoms of a stroke. These may include:  ? Sudden numbness, tingling, weakness, or loss of movement in your face, arm, or leg, especially on only one side of your body. ? Sudden vision changes. ? Sudden trouble speaking. ? Sudden confusion or trouble understanding simple statements. ? Sudden problems with walking or balance. ? A sudden, severe headache that is different from past headaches.     · You have severe back or belly pain. Do not wait until your blood pressure comes down on its own. Get help right away. Call your doctor now or seek immediate care if:    · Your blood pressure is much higher than normal (such as 180/120 or higher), but you don't have symptoms.     · You think high blood pressure is causing symptoms, such as:  ? Severe headache.  ? Blurry vision. Watch closely for changes in your health, and be sure to contact your doctor if:    · Your blood pressure measures higher than your doctor recommends at least 2 times. That means the top number is higher or the bottom number is higher, or both.     · You think you may be having side effects from your blood pressure medicine. Where can you learn more? Go to http://www.gray.com/  Enter [id number] in the search box to learn more about \"High Blood Pressure: Care Instructions. \"  Current as of: April 29, 2021               Content Version: 13.0  © 2006-2021 [employer]. Care instructions adapted under license by [employer] (which disclaims liability or warranty for this information). If you have questions about a medical condition or this instruction, always ask your healthcare professional. [place] [address] any warranty or liability for your use of this information.

## 2021-11-11 NOTE — PROGRESS NOTES
Identified Patient with two Patient identifiers (Name and ). Two Patient Identifiers confirmed. Reviewed record in preparation for visit and have obtained necessary documentation. Chief Complaint   Patient presents with    Follow Up Chronic Condition       Visit Vitals  /71 (BP 1 Location: Right arm, BP Patient Position: Sitting, BP Cuff Size: Adult)   Pulse 90   Temp 98.3 °F (36.8 °C) (Oral)   Resp 18   Ht 5' 4\" (1.626 m)   Wt 160 lb (72.6 kg)   SpO2 96%   BMI 27.46 kg/m²       1. Have you been to the ER, urgent care clinic since your last visit? Hospitalized since your last visit? No    2. Have you seen or consulted any other health care providers outside of the [address] [place] [address] since your last visit? Include any pap smears or colon screening.  No

## 2021-11-12 ENCOUNTER — PATIENT MESSAGE (OUTPATIENT)
Dept: FAMILY MEDICINE CLINIC | Age: 66
End: 2021-11-12

## 2021-12-01 ENCOUNTER — PATIENT MESSAGE (OUTPATIENT)
Dept: FAMILY MEDICINE CLINIC | Age: 66
End: 2021-12-01

## 2021-12-01 DIAGNOSIS — F41.9 ANXIETY: ICD-10-CM

## 2021-12-02 RX ORDER — VENLAFAXINE HYDROCHLORIDE 37.5 MG/1
CAPSULE, EXTENDED RELEASE ORAL
Qty: 90 CAPSULE | Refills: 3 | Status: SHIPPED | OUTPATIENT
Start: 2021-12-02 | End: 2022-09-29 | Stop reason: SDUPTHER

## 2021-12-02 NOTE — TELEPHONE ENCOUNTER
From: Jaqueline Cerda  To: Joaquín Naidu MD  Sent: 12/1/2021 2:23 PM EST  Subject: Prescription refill for 90 days    I am due to refill Venlafaxine on 12/10/21. I'd like to request a refill for 90 days, the same as my other medications.     Thanks,  Nicky Mccauley

## 2021-12-24 DIAGNOSIS — I10 ESSENTIAL HYPERTENSION: ICD-10-CM

## 2022-01-04 RX ORDER — POTASSIUM CHLORIDE 750 MG/1
TABLET, FILM COATED, EXTENDED RELEASE ORAL
Qty: 180 TABLET | Refills: 0 | Status: SHIPPED | OUTPATIENT
Start: 2022-01-04 | End: 2022-07-13

## 2022-01-25 ENCOUNTER — OFFICE VISIT (OUTPATIENT)
Dept: FAMILY MEDICINE CLINIC | Age: 67
End: 2022-01-25
Payer: MEDICARE

## 2022-01-25 VITALS
SYSTOLIC BLOOD PRESSURE: 131 MMHG | BODY MASS INDEX: 27.72 KG/M2 | DIASTOLIC BLOOD PRESSURE: 77 MMHG | HEIGHT: 64 IN | HEART RATE: 78 BPM | RESPIRATION RATE: 17 BRPM | OXYGEN SATURATION: 98 % | WEIGHT: 162.4 LBS | TEMPERATURE: 97.1 F

## 2022-01-25 DIAGNOSIS — M79.652 LEFT THIGH PAIN: Primary | ICD-10-CM

## 2022-01-25 PROCEDURE — G8754 DIAS BP LESS 90: HCPCS | Performed by: STUDENT IN AN ORGANIZED HEALTH CARE EDUCATION/TRAINING PROGRAM

## 2022-01-25 PROCEDURE — 99214 OFFICE O/P EST MOD 30 MIN: CPT | Performed by: STUDENT IN AN ORGANIZED HEALTH CARE EDUCATION/TRAINING PROGRAM

## 2022-01-25 PROCEDURE — G8419 CALC BMI OUT NRM PARAM NOF/U: HCPCS | Performed by: STUDENT IN AN ORGANIZED HEALTH CARE EDUCATION/TRAINING PROGRAM

## 2022-01-25 PROCEDURE — 1090F PRES/ABSN URINE INCON ASSESS: CPT | Performed by: STUDENT IN AN ORGANIZED HEALTH CARE EDUCATION/TRAINING PROGRAM

## 2022-01-25 PROCEDURE — 3017F COLORECTAL CA SCREEN DOC REV: CPT | Performed by: STUDENT IN AN ORGANIZED HEALTH CARE EDUCATION/TRAINING PROGRAM

## 2022-01-25 PROCEDURE — G8427 DOCREV CUR MEDS BY ELIG CLIN: HCPCS | Performed by: STUDENT IN AN ORGANIZED HEALTH CARE EDUCATION/TRAINING PROGRAM

## 2022-01-25 PROCEDURE — G8432 DEP SCR NOT DOC, RNG: HCPCS | Performed by: STUDENT IN AN ORGANIZED HEALTH CARE EDUCATION/TRAINING PROGRAM

## 2022-01-25 PROCEDURE — G0463 HOSPITAL OUTPT CLINIC VISIT: HCPCS | Performed by: STUDENT IN AN ORGANIZED HEALTH CARE EDUCATION/TRAINING PROGRAM

## 2022-01-25 PROCEDURE — 1101F PT FALLS ASSESS-DOCD LE1/YR: CPT | Performed by: STUDENT IN AN ORGANIZED HEALTH CARE EDUCATION/TRAINING PROGRAM

## 2022-01-25 PROCEDURE — G9899 SCRN MAM PERF RSLTS DOC: HCPCS | Performed by: STUDENT IN AN ORGANIZED HEALTH CARE EDUCATION/TRAINING PROGRAM

## 2022-01-25 PROCEDURE — G8752 SYS BP LESS 140: HCPCS | Performed by: STUDENT IN AN ORGANIZED HEALTH CARE EDUCATION/TRAINING PROGRAM

## 2022-01-25 PROCEDURE — G8399 PT W/DXA RESULTS DOCUMENT: HCPCS | Performed by: STUDENT IN AN ORGANIZED HEALTH CARE EDUCATION/TRAINING PROGRAM

## 2022-01-25 PROCEDURE — G8536 NO DOC ELDER MAL SCRN: HCPCS | Performed by: STUDENT IN AN ORGANIZED HEALTH CARE EDUCATION/TRAINING PROGRAM

## 2022-01-25 NOTE — PROGRESS NOTES
Braeden Wall is a 78 y.o. female    Chief Complaint   Patient presents with    Leg Pain       1. Have you been to the ER, urgent care clinic since your last visit? Hospitalized since your last visit? No  2. Have you seen or consulted any other health care providers outside of the [address] [place] [address] since your last visit? Include any pap smears or colon screening. No    Visit Vitals  /77 (BP 1 Location: Right arm, BP Patient Position: Sitting)   Pulse 78   Temp 97.1 °F (36.2 °C) (Temporal)   Resp 17   Ht 5' 4\" (1.626 m)   Wt 162 lb 6.4 oz (73.7 kg)   SpO2 98%   BMI 27.88 kg/m²       Health Maintenance Due   Topic Date Due    COVID-19 Vaccine (3 - Booster for Moderna series) 10/18/2021       Patient states having left inner thigh pain that started in the middle of the night last night.  Goes away when putting pressure on it, such as sitting states the pain is 5/10 when moving in certain positions

## 2022-01-25 NOTE — PROGRESS NOTES
So Franklin  78 y.o. female  1955  NNA:[id number]  [facility], [place], operated by [facility]  Progress Note     Encounter Date: 1/25/2022    Assessment and Plan:     Encounter Diagnoses     ICD-10-CM ICD-9-CM   1. Left thigh pain  M79.652 729.5       1. Left thigh pain  - Patient presents with L thigh pain that started early this AM. On PE no signs of infection, weakness, or injury noted. - Will send patient for imaging as noted below  - Patient encouraged to use NSAIDs prn for pain  - Will follow up pending studies  - XR FEMUR LT 2 V; Future  - XR HIP LT W OR WO PELV 2-3 VWS; Future  - US EXT NONVAS LT LTD; Future      I have discussed the diagnosis with the patient and the intended plan as seen in the above orders. she has expressed understanding. The patient has received an after-visit summary and questions were answered concerning future plans. I have discussed medication side effects and warnings with the patient as well. Electronically Signed: Natanael López, DO    Current Medications after this visit     Current Outpatient Medications   Medication Sig    potassium chloride SR (KLOR-CON 10) 10 mEq tablet TAKE ONE TABLET BY MOUTH TWICE A DAY    venlafaxine-SR (EFFEXOR-XR) 37.5 mg capsule TAKE ONE CAPSULE BY MOUTH ONE TIME DAILY    amLODIPine (NORVASC) 5 mg tablet Take 1 Tablet by mouth daily.  hydroCHLOROthiazide (HYDRODIURIL) 25 mg tablet Take 1 Tablet by mouth daily.  multivitamin (ONE A DAY) tablet Take 1 Tab by mouth daily.  acetaminophen (TYLENOL) 500 mg tablet Take  by mouth every six (6) hours as needed for Pain. No current facility-administered medications for this visit. There are no discontinued medications.   ~~~~~~~~~~~~~~~~~~~~~~~~~~~~~~~~~~~~~~~~~~~~~~~~~~~~~~~~~~~    Chief Complaint   Patient presents with    Leg Pain       History provided by patient  History of Present Illness   So Franklin is a 78 y.o. female who presents to clinic today for:  Leg Pain      Patient reports L sided thigh pain that started 3am this morning. It is located in her inner thigh near her pelvis. It is not tender to palpation. It is worse with walking or with certain movements. Denies any swelling, skin changes, urinary symptoms, weakness, numbness, injury, or masses. This has never happened before     Health Maintenance    Health Maintenance Due   Topic Date Due    COVID-19 Vaccine (3 - Booster for Moderna series) 10/18/2021     Review of Systems   Review of Systems   Constitutional: Negative for chills, fever and weight loss. Respiratory: Negative for cough and shortness of breath. Cardiovascular: Negative for chest pain and leg swelling. Gastrointestinal: Negative for abdominal pain, nausea and vomiting. Musculoskeletal: Negative for back pain, joint pain and neck pain. Neurological: Negative for dizziness, tingling and headaches. Vitals/Objective:     Vitals:    01/25/22 1400   BP: 131/77   Pulse: 78   Resp: 17   Temp: 97.1 °F (36.2 °C)   TempSrc: Temporal   SpO2: 98%   Weight: 162 lb 6.4 oz (73.7 kg)   Height: 5' 4\" (1.626 m)     Body mass index is 27.88 kg/m². Wt Readings from Last 3 Encounters:   01/25/22 162 lb 6.4 oz (73.7 kg)   11/11/21 160 lb (72.6 kg)   04/16/21 157 lb (71.2 kg)         Objective  Physical Exam  Constitutional:       General: She is not in acute distress. HENT:      Head: Normocephalic and atraumatic. Cardiovascular:      Rate and Rhythm: Normal rate and regular rhythm. Pulses:           Dorsalis pedis pulses are 2+ on the right side and 2+ on the left side. Posterior tibial pulses are 2+ on the right side and 2+ on the left side. Heart sounds: No murmur heard. Pulmonary:      Effort: Pulmonary effort is normal. No respiratory distress. Abdominal:      Palpations: Abdomen is soft. Tenderness: There is no abdominal tenderness. There is no guarding or rebound.    Musculoskeletal:      Right hip: No tenderness or bony tenderness. Normal range of motion. Normal strength. Left hip: No tenderness or bony tenderness. Normal range of motion. Normal strength. Right upper leg: No swelling, edema, deformity, tenderness or bony tenderness. Left upper leg: No swelling, edema, deformity, tenderness or bony tenderness. Right lower leg: No edema. Left lower leg: No edema. Right ankle: No tenderness. Normal range of motion. Normal pulse. Left ankle: No tenderness. Normal range of motion. Normal pulse. Neurological:      General: No focal deficit present. Mental Status: She is oriented to person, place, and time. No results found for this or any previous visit (from the past 24 hour(s)). Disposition     Future Appointments   Date Time Provider Yves Becerril   2022  3:00 PM DO LEXA CloudFP BS AMB       History   Patient's past medical, surgical and family histories were reviewed and updated. Past Medical History:   Diagnosis Date    Asthma     Hypertension     Pneumonia 2017     Past Surgical History:   Procedure Laterality Date    HX  SECTION       Family History   Problem Relation Age of Onset   [facility] Breast Cancer Mother 59    Hypertension Father     Cancer Sister     Colon Cancer Sister     Psychiatric Disorder Brother         schizophrenia    Cancer Other     Colon Cancer Other      Social History     Tobacco Use    Smoking status: Never Smoker    Smokeless tobacco: Never Used   Substance Use Topics    Alcohol use:  Yes     Alcohol/week: 1.0 standard drink     Types: 1 Glasses of wine per week     Comment: rarely    Drug use: No       Allergies     Allergies   Allergen Reactions    Other Plant, Animal, Environmental Shortness of Breath     CATS    Erythromycin Nausea Only

## 2022-01-26 ENCOUNTER — HOSPITAL ENCOUNTER (OUTPATIENT)
Dept: VASCULAR SURGERY | Age: 67
Discharge: HOME | End: 2022-01-26
Attending: STUDENT IN AN ORGANIZED HEALTH CARE EDUCATION/TRAINING PROGRAM
Payer: MEDICARE

## 2022-01-26 ENCOUNTER — TELEPHONE (OUTPATIENT)
Dept: FAMILY MEDICINE CLINIC | Age: 67
End: 2022-01-26

## 2022-01-26 DIAGNOSIS — M79.652 LEFT THIGH PAIN: ICD-10-CM

## 2022-01-26 DIAGNOSIS — M79.652 LEFT THIGH PAIN: Primary | ICD-10-CM

## 2022-01-26 PROCEDURE — 93971 EXTREMITY STUDY: CPT

## 2022-01-26 NOTE — TELEPHONE ENCOUNTER
Hi Good Morning  Rep. From medical center called wanted to request a Specific order for Pt. to0 [facility] dept. For Duplex lower extremedy and needs to specify the sight in her left leg.   Please call back to confirm.    Zulema Brunner

## 2022-01-27 ENCOUNTER — OFFICE VISIT (OUTPATIENT)
Dept: FAMILY MEDICINE CLINIC | Age: 67
End: 2022-01-27
Payer: MEDICARE

## 2022-01-27 VITALS
DIASTOLIC BLOOD PRESSURE: 68 MMHG | SYSTOLIC BLOOD PRESSURE: 118 MMHG | RESPIRATION RATE: 16 BRPM | OXYGEN SATURATION: 95 % | HEART RATE: 83 BPM

## 2022-01-27 DIAGNOSIS — Z01.818 PREOP EXAMINATION: ICD-10-CM

## 2022-01-27 DIAGNOSIS — H25.9 AGE-RELATED CATARACT OF BOTH EYES, UNSPECIFIED AGE-RELATED CATARACT TYPE: Primary | ICD-10-CM

## 2022-01-27 PROCEDURE — 1101F PT FALLS ASSESS-DOCD LE1/YR: CPT | Performed by: STUDENT IN AN ORGANIZED HEALTH CARE EDUCATION/TRAINING PROGRAM

## 2022-01-27 PROCEDURE — G0463 HOSPITAL OUTPT CLINIC VISIT: HCPCS | Performed by: STUDENT IN AN ORGANIZED HEALTH CARE EDUCATION/TRAINING PROGRAM

## 2022-01-27 PROCEDURE — G8419 CALC BMI OUT NRM PARAM NOF/U: HCPCS | Performed by: STUDENT IN AN ORGANIZED HEALTH CARE EDUCATION/TRAINING PROGRAM

## 2022-01-27 PROCEDURE — G8754 DIAS BP LESS 90: HCPCS | Performed by: STUDENT IN AN ORGANIZED HEALTH CARE EDUCATION/TRAINING PROGRAM

## 2022-01-27 PROCEDURE — G9899 SCRN MAM PERF RSLTS DOC: HCPCS | Performed by: STUDENT IN AN ORGANIZED HEALTH CARE EDUCATION/TRAINING PROGRAM

## 2022-01-27 PROCEDURE — G8399 PT W/DXA RESULTS DOCUMENT: HCPCS | Performed by: STUDENT IN AN ORGANIZED HEALTH CARE EDUCATION/TRAINING PROGRAM

## 2022-01-27 PROCEDURE — 3017F COLORECTAL CA SCREEN DOC REV: CPT | Performed by: STUDENT IN AN ORGANIZED HEALTH CARE EDUCATION/TRAINING PROGRAM

## 2022-01-27 PROCEDURE — G8432 DEP SCR NOT DOC, RNG: HCPCS | Performed by: STUDENT IN AN ORGANIZED HEALTH CARE EDUCATION/TRAINING PROGRAM

## 2022-01-27 PROCEDURE — G8536 NO DOC ELDER MAL SCRN: HCPCS | Performed by: STUDENT IN AN ORGANIZED HEALTH CARE EDUCATION/TRAINING PROGRAM

## 2022-01-27 PROCEDURE — 1090F PRES/ABSN URINE INCON ASSESS: CPT | Performed by: STUDENT IN AN ORGANIZED HEALTH CARE EDUCATION/TRAINING PROGRAM

## 2022-01-27 PROCEDURE — G8427 DOCREV CUR MEDS BY ELIG CLIN: HCPCS | Performed by: STUDENT IN AN ORGANIZED HEALTH CARE EDUCATION/TRAINING PROGRAM

## 2022-01-27 PROCEDURE — 99213 OFFICE O/P EST LOW 20 MIN: CPT | Performed by: STUDENT IN AN ORGANIZED HEALTH CARE EDUCATION/TRAINING PROGRAM

## 2022-01-27 PROCEDURE — G8752 SYS BP LESS 140: HCPCS | Performed by: STUDENT IN AN ORGANIZED HEALTH CARE EDUCATION/TRAINING PROGRAM

## 2022-01-27 NOTE — PROGRESS NOTES
Preoperative Evaluation for Braeden Wall     1/27/2022  Chief Complaint   Patient presents with    Pre-op Exam       HPI:   Braeden Wall is a 78 y.o. female referred for evaluation by:Dr. Sam Apodaca for Pre- Op Evaluation. Type of surgery and indication: Cataract surgery bilateral eyes  Surgery site : bilateral eyes  Anesthesia type: Sedation  Date of procedure:  2/7/22    Review of Systems   Review of Systems   Constitutional: Negative for fever. Eyes:        Cataracts   Respiratory: Negative for shortness of breath. Cardiovascular: Negative for chest pain. Gastrointestinal: Negative for abdominal pain, diarrhea, nausea and vomiting. Neurological: Negative for dizziness. Inherent Risk of Surgery   Surgical risk:  low    Patient Cardiac Risk Assessment   Risk Assessment using Revised Grimaldo cardiac risk index (RCRI):  High-risk type of surgery (examples include vascular surgery and any open intraperitoneal or intrathoracic procedures): no  History of ischemic heart disease (i.e. MI or a positive exercise test, current complaint of chest pain, use of nitrate therapy, or ECG with pathological Q waves): no  History of HF: no  History of cerebrovascular disease: no  Diabetes mellitus requiring treatment with insulin: no  Preoperative serum creatinine >2.0 mg/dL (177 µmol/L): no    Assessment of Cardiac Functional Status     Can perform 4 Mets? yes. Prior cardiac evaluation:   Yes.  Neg stress test in 2019.     <4 METS >4 METS   Care for self Climb a flight of stairs or a hill   Walk indoors around housse Walk on level ground at 4 mph   Walk 2-3 blocks on level ground (2-3 mph) Run a short distance   Light work around house (dust, dishes) Heavy work around house (scrub floors, move furniture)       Other Risk Factors    Screening for ETOH use:  Done and low risk  Smoking status:  never    Personal or FH of bleeding problems:  no  Personal or FH of blood clots:  no  Personal or FH of anesthesia problems:  no    Pulmonary Risk:  Asthma or COPD:  Yes, well controlled. Obesity:  There is no height or weight on file to calculate BMI. Surgery close to diaphragm:  no  Known HAFSA:  no    Past Medical, Surgical, Social History     Allergies  Latex allergy: no  Prior reactions to anesthesia:  None  Allergies   Allergen Reactions    Other Plant, Animal, Environmental Shortness of Breath     CATS    Erythromycin Nausea Only          Medications- review any meds requiring changes prior to surgery   Current Outpatient Medications   Medication Sig    potassium chloride SR (KLOR-CON 10) 10 mEq tablet TAKE ONE TABLET BY MOUTH TWICE A DAY    venlafaxine-SR (EFFEXOR-XR) 37.5 mg capsule TAKE ONE CAPSULE BY MOUTH ONE TIME DAILY    amLODIPine (NORVASC) 5 mg tablet Take 1 Tablet by mouth daily.  hydroCHLOROthiazide (HYDRODIURIL) 25 mg tablet Take 1 Tablet by mouth daily.  multivitamin (ONE A DAY) tablet Take 1 Tab by mouth daily.  acetaminophen (TYLENOL) 500 mg tablet Take  by mouth every six (6) hours as needed for Pain. No current facility-administered medications for this visit.          Past Medical History   Past Medical History:   Diagnosis Date    Asthma     Hypertension     Pneumonia 09/21/2017         Immunizations  Immunization History   Administered Date(s) Administered    COVID-19, [facility], Primary or Immunocompromised Series, MRNA, PF, 100mcg/0.5mL 04/20/2021, 05/18/2021    Influenza High Dose Vaccine PF 09/24/2019    Influenza Vaccine 10/27/2014, 10/06/2015, 09/25/2018, 09/24/2019, 08/31/2020, 09/19/2021    Influenza Vaccine (Quad) PF (>6 Mo Flulaval, Fluarix, and >3 Yrs Afluria, Fluzone [id number]) 10/12/2017    Influenza, Quadrivalent, Adjuvanted (>65 Yrs FLUAD QUAD [id number]) 08/31/2020    Pneumococcal Conjugate (PCV-13) 05/31/2018    Pneumococcal Polysaccharide (PPSV-23) 10/16/2018    Tdap 10/06/2015    Zoster Recombinant 07/13/2021, 11/11/2021    Zoster Vaccine, Live 01/01/2012       Objective     Vitals:    01/27/22 1527   BP: 118/68   Pulse: 83   Resp: 16   SpO2: 95%       Physical Exam  Physical Exam  Constitutional:       Appearance: Normal appearance. HENT:      Head: Normocephalic and atraumatic. Nose: Nose normal.      Mouth/Throat:      Mouth: Mucous membranes are dry. Eyes:      Conjunctiva/sclera: Conjunctivae normal.   Cardiovascular:      Rate and Rhythm: Normal rate and regular rhythm. Pulses: Normal pulses. Heart sounds: Normal heart sounds. Pulmonary:      Effort: Pulmonary effort is normal.      Breath sounds: Normal breath sounds. Abdominal:      Palpations: Abdomen is soft. Musculoskeletal:         General: Normal range of motion. Cervical back: Normal range of motion. Skin:     General: Skin is warm. Neurological:      Mental Status: She is alert and oriented to person, place, and time. Psychiatric:         Mood and Affect: Mood normal.         Behavior: Behavior normal.          Assessment and Plan   1. Preop Exam for Age-related cataract of both eyes, unspecified age-related cataract type:   Any Gibbons is scheduled to have noncardiac surgery and has been evaluated for the risk of a cardiovascular perioperative cardiac event. The estimated risk of an adverse cardiac event using the Revised Grimaldo Cardiac Risk Index (RCRI) is Class 1 risk (3.9%). There is no additional cardiovascular testing required. Per ACC/AHA guidelines, patient is low risk for a(n) low risk surgery. I discussed the aforementioned diagnoses with the patient as well as the plan of care.      I discussed the patient with Dr. Adrianne Canada (Attending Physician)     Veronica Gao DO  Family Medicine Resident

## 2022-03-19 PROBLEM — J18.9 CAP (COMMUNITY ACQUIRED PNEUMONIA): Status: ACTIVE | Noted: 2017-09-22

## 2022-03-20 PROBLEM — J18.9 PNEUMONIA: Status: ACTIVE | Noted: 2017-09-21

## 2022-03-27 DIAGNOSIS — I10 ESSENTIAL HYPERTENSION: ICD-10-CM

## 2022-03-27 RX ORDER — POTASSIUM CHLORIDE 750 MG/1
10 TABLET, FILM COATED, EXTENDED RELEASE ORAL 2 TIMES DAILY
Qty: 180 TABLET | Refills: 0 | Status: CANCELLED | OUTPATIENT
Start: 2022-03-27

## 2022-03-27 RX ORDER — HYDROCHLOROTHIAZIDE 25 MG/1
25 TABLET ORAL DAILY
Qty: 90 TABLET | Refills: 0 | Status: CANCELLED | OUTPATIENT
Start: 2022-03-27

## 2022-03-27 RX ORDER — AMLODIPINE BESYLATE 5 MG/1
5 TABLET ORAL DAILY
Qty: 90 TABLET | Refills: 0 | Status: CANCELLED | OUTPATIENT
Start: 2022-03-27

## 2022-03-30 DIAGNOSIS — I10 ESSENTIAL HYPERTENSION: ICD-10-CM

## 2022-03-30 RX ORDER — HYDROCHLOROTHIAZIDE 25 MG/1
TABLET ORAL
Qty: 90 TABLET | Refills: 0 | Status: SHIPPED | OUTPATIENT
Start: 2022-03-30 | End: 2022-06-26 | Stop reason: SDUPTHER

## 2022-03-30 RX ORDER — AMLODIPINE BESYLATE 5 MG/1
TABLET ORAL
Qty: 90 TABLET | Refills: 0 | Status: SHIPPED | OUTPATIENT
Start: 2022-03-30 | End: 2022-06-26 | Stop reason: SDUPTHER

## 2022-03-30 RX ORDER — POTASSIUM CHLORIDE 750 MG/1
TABLET, EXTENDED RELEASE ORAL
Qty: 180 TABLET | Refills: 0 | Status: SHIPPED | OUTPATIENT
Start: 2022-03-30 | End: 2022-06-26 | Stop reason: SDUPTHER

## 2022-04-07 RX ORDER — POTASSIUM CHLORIDE 750 MG/1
10 TABLET, FILM COATED, EXTENDED RELEASE ORAL 2 TIMES DAILY
Qty: 180 TABLET | Refills: 0 | Status: CANCELLED | OUTPATIENT
Start: 2022-04-07

## 2022-06-17 ENCOUNTER — TRANSCRIBE ORDER (OUTPATIENT)
Dept: SCHEDULING | Age: 67
End: 2022-06-17

## 2022-06-17 DIAGNOSIS — M75.41 IMPINGEMENT SYNDROME OF RIGHT SHOULDER: Primary | ICD-10-CM

## 2022-06-22 ENCOUNTER — HOSPITAL ENCOUNTER (OUTPATIENT)
Dept: MRI IMAGING | Age: 67
Discharge: HOME | End: 2022-06-22
Attending: ORTHOPAEDIC SURGERY
Payer: MEDICARE

## 2022-06-22 DIAGNOSIS — M75.41 IMPINGEMENT SYNDROME OF RIGHT SHOULDER: ICD-10-CM

## 2022-06-22 PROCEDURE — 73221 MRI JOINT UPR EXTREM W/O DYE: CPT

## 2022-06-26 DIAGNOSIS — I10 ESSENTIAL HYPERTENSION: ICD-10-CM

## 2022-06-28 RX ORDER — AMLODIPINE BESYLATE 5 MG/1
5 TABLET ORAL DAILY
Qty: 90 TABLET | Refills: 0 | Status: SHIPPED | OUTPATIENT
Start: 2022-06-28 | End: 2022-09-29 | Stop reason: SDUPTHER

## 2022-06-28 RX ORDER — POTASSIUM CHLORIDE 750 MG/1
10 TABLET, EXTENDED RELEASE ORAL 2 TIMES DAILY
Qty: 180 TABLET | Refills: 0 | Status: SHIPPED | OUTPATIENT
Start: 2022-06-28 | End: 2022-09-19 | Stop reason: SDUPTHER

## 2022-06-28 RX ORDER — HYDROCHLOROTHIAZIDE 25 MG/1
25 TABLET ORAL DAILY
Qty: 90 TABLET | Refills: 0 | Status: SHIPPED | OUTPATIENT
Start: 2022-06-28 | End: 2022-09-29 | Stop reason: SDUPTHER

## 2022-07-11 ENCOUNTER — OFFICE VISIT (OUTPATIENT)
Dept: FAMILY MEDICINE CLINIC | Age: 67
End: 2022-07-11
Payer: MEDICARE

## 2022-07-11 VITALS
HEART RATE: 83 BPM | RESPIRATION RATE: 18 BRPM | OXYGEN SATURATION: 98 % | DIASTOLIC BLOOD PRESSURE: 87 MMHG | WEIGHT: 164 LBS | BODY MASS INDEX: 28.15 KG/M2 | TEMPERATURE: 98.6 F | SYSTOLIC BLOOD PRESSURE: 147 MMHG

## 2022-07-11 DIAGNOSIS — K62.5 RECTAL BLEEDING: Primary | ICD-10-CM

## 2022-07-11 LAB
BILIRUB UR QL STRIP: NEGATIVE
GLUCOSE UR-MCNC: NEGATIVE MG/DL
HGB BLD-MCNC: 13.5 G/DL
KETONES P FAST UR STRIP-MCNC: NEGATIVE MG/DL
PH UR STRIP: 7 [PH] (ref 4.6–8)
PROT UR QL STRIP: NEGATIVE
SP GR UR STRIP: 1.01 (ref 1–1.03)
UA UROBILINOGEN AMB POC: NORMAL (ref 0.2–1)
URINALYSIS CLARITY POC: CLEAR
URINALYSIS COLOR POC: YELLOW
URINE BLOOD POC: NEGATIVE
URINE LEUKOCYTES POC: NEGATIVE
URINE NITRITES POC: NEGATIVE

## 2022-07-11 PROCEDURE — 99214 OFFICE O/P EST MOD 30 MIN: CPT | Performed by: FAMILY MEDICINE

## 2022-07-11 PROCEDURE — 85018 HEMOGLOBIN: CPT | Performed by: FAMILY MEDICINE

## 2022-07-11 PROCEDURE — 1090F PRES/ABSN URINE INCON ASSESS: CPT | Performed by: FAMILY MEDICINE

## 2022-07-11 PROCEDURE — 1123F ACP DISCUSS/DSCN MKR DOCD: CPT | Performed by: FAMILY MEDICINE

## 2022-07-11 PROCEDURE — G8432 DEP SCR NOT DOC, RNG: HCPCS | Performed by: FAMILY MEDICINE

## 2022-07-11 PROCEDURE — G8754 DIAS BP LESS 90: HCPCS | Performed by: FAMILY MEDICINE

## 2022-07-11 PROCEDURE — G8399 PT W/DXA RESULTS DOCUMENT: HCPCS | Performed by: FAMILY MEDICINE

## 2022-07-11 PROCEDURE — G8427 DOCREV CUR MEDS BY ELIG CLIN: HCPCS | Performed by: FAMILY MEDICINE

## 2022-07-11 PROCEDURE — G9899 SCRN MAM PERF RSLTS DOC: HCPCS | Performed by: FAMILY MEDICINE

## 2022-07-11 PROCEDURE — 1101F PT FALLS ASSESS-DOCD LE1/YR: CPT | Performed by: FAMILY MEDICINE

## 2022-07-11 PROCEDURE — G8417 CALC BMI ABV UP PARAM F/U: HCPCS | Performed by: FAMILY MEDICINE

## 2022-07-11 PROCEDURE — G8536 NO DOC ELDER MAL SCRN: HCPCS | Performed by: FAMILY MEDICINE

## 2022-07-11 PROCEDURE — 3017F COLORECTAL CA SCREEN DOC REV: CPT | Performed by: FAMILY MEDICINE

## 2022-07-11 PROCEDURE — G0463 HOSPITAL OUTPT CLINIC VISIT: HCPCS | Performed by: FAMILY MEDICINE

## 2022-07-11 PROCEDURE — G8753 SYS BP > OR = 140: HCPCS | Performed by: FAMILY MEDICINE

## 2022-07-11 PROCEDURE — 81003 URINALYSIS AUTO W/O SCOPE: CPT | Performed by: FAMILY MEDICINE

## 2022-07-11 NOTE — PROGRESS NOTES
Camille Opitz is a 78 y.o. female    Chief Complaint   Patient presents with    Rectal Bleeding     woke up friday night with cramping abd pain, then had diarrhea. noticed bright red blood. saturday evening, she noticed the bright red bleeding again. has mild cramping curretly. has hx of hemorrhoids     Has been taking tylenol and advil for relief. Last colonoscopy august 2022. Due next august. No findings last time. 1. \"Have you been to the ER, urgent care clinic since your last visit? Hospitalized since your last visit? \" No    2. \"Have you seen or consulted any other health care providers outside of the [address] [place] [address] since your last visit? \" No     3. For patients aged 39-70: Has the patient had a colonoscopy / FIT/ Cologuard? Yes - Care Gap present. Most recent result on file      If the patient is female:    4. For patients aged 41-77: Has the patient had a mammogram within the past 2 years? Yes - no Care Gap present      5. For patients aged 21-65: Has the patient had a pap smear? No    Vitals:    07/11/22 1057   BP: (!) 147/87   Pulse: 83   Temp: 98.6 °F (37 °C)   Resp: 18   Weight: 164 lb (74.4 kg)   SpO2: 98%             Health Maintenance Due   Topic Date Due    COVID-19 Vaccine (3 - Booster for Moderna series) 10/18/2021    Depression Screen  04/16/2022    Medicare Yearly Exam  04/17/2022    Colorectal Cancer Screening Combo  04/26/2022         Medication Reconciliation completed, changes noted.   Please update medication list.

## 2022-07-11 NOTE — PROGRESS NOTES
[id number] [facility]      Chief Complaint:   Chief Complaint   Patient presents with   [facility] Rectal Bleeding     woke up friday night with cramping abd pain, then had diarrhea. noticed bright red blood. saturday evening, she noticed the bright red bleeding again. has mild cramping curretly. has hx of hemorrhoids       SUBJECTIVE:  Mike Hathaway is a 78 y.o. female who presents for evaluation of rectal bleeding. Bleeding first noticed 3 days with BM x1 with loose stool. Another episode 2 days ago. No n/v. C/o abdominal cramping. H/o hemorrhoids but does not feel hemorrhoids currently. No fever or recent illness. No cp/sob. No lightheadedness or dizziness. PMHx:  Past Medical History:   Diagnosis Date    Asthma     Hypertension     Pneumonia 09/21/2017       Meds:   Current Outpatient Medications   Medication Sig Dispense Refill    amLODIPine (NORVASC) 5 mg tablet Take 1 Tablet by mouth daily. 90 Tablet 0    hydroCHLOROthiazide (HYDRODIURIL) 25 mg tablet Take 1 Tablet by mouth daily. 90 Tablet 0    potassium chloride (KLOR-CON M10) 10 mEq tablet Take 1 Tablet by mouth two (2) times a day. 180 Tablet 0    venlafaxine-SR (EFFEXOR-XR) 37.5 mg capsule TAKE ONE CAPSULE BY MOUTH ONE TIME DAILY 90 Capsule 3    multivitamin (ONE A DAY) tablet Take 1 Tab by mouth daily.  potassium chloride SR (KLOR-CON 10) 10 mEq tablet TAKE ONE TABLET BY MOUTH TWICE A  Tablet 0       Allergies:    Allergies   Allergen Reactions    Other Plant, Animal, Environmental Shortness of Breath     CATS    Erythromycin Nausea Only       Smoker:  Social History     Tobacco Use   Smoking Status Never Smoker   Smokeless Tobacco Never Used       ETOH:   Social History     Substance and Sexual Activity   Alcohol Use Yes    Alcohol/week: 1.0 standard drink    Types: 1 Glasses of wine per week    Comment: rarely       FH:   Family History Problem Relation Age of Onset    Breast Cancer Mother 59    Hypertension Father     Cancer Sister     Colon Cancer Sister     Psychiatric Disorder Brother         schizophrenia    Cancer Other     Colon Cancer Other        ROS:Per HPI    Physical Exam:  Visit Vitals  BP (!) 147/87   Pulse 83   Temp 98.6 °F (37 °C)   Resp 18   Wt 164 lb (74.4 kg)   SpO2 98%   BMI 28.15 kg/m²     GEN: No apparent distress. Alert and oriented and responds to all questions appropriately. EYES:  Conjunctiva clear;  LUNGS: Respirations unlabored; clear to auscultation bilaterally  CARDIOVASCULAR: Regular, rate, and rhythm without murmurs, gallops or rubs   ABDOMEN: Soft; nontender; nondistended; normoactive bowel sounds; no rebound or guarding  NEUROLOGIC:  No focal neurologic deficits. EXT: Well perfused. No edema. SKIN: No obvious rashes. UA: WNL    Assessment/Plan:  Rectal bleeding: Likely due to hemorrhoid. No sign of active bleeding currently. HDS and exam WNL today. She will call her GI to schedule colonoscopy now instead of regular time frame of next year. Signs and sx of when to go to the ER discussed. RTC: PRN and for regular health maintenance.

## 2022-07-19 NOTE — TELEPHONE ENCOUNTER
----- Message from Gaurav Price sent at 7/19/2022  9:09 AM EDT -----  Regarding: Refill Request  amphetamine-dextroamphetamine (ADDERALL XR, 15MG,) 15 MG 24 hr capsule    [facility] [place] [address] [address]   Phone:  [phone]  Fax:  [phone]      Next Visit 8/16/22 Sandra Olmedo Tried to call patient to inform Referral has been placed on chart, kept ringing and never went to , thank you.

## 2022-09-22 RX ORDER — POTASSIUM CHLORIDE 750 MG/1
10 TABLET, EXTENDED RELEASE ORAL 2 TIMES DAILY
Qty: 180 TABLET | Refills: 0 | Status: SHIPPED | OUTPATIENT
Start: 2022-09-22

## 2022-09-29 ENCOUNTER — OFFICE VISIT (OUTPATIENT)
Dept: FAMILY MEDICINE CLINIC | Age: 67
End: 2022-09-29
Payer: MEDICARE

## 2022-09-29 VITALS
BODY MASS INDEX: 28.07 KG/M2 | HEART RATE: 81 BPM | HEIGHT: 64 IN | WEIGHT: 164.4 LBS | RESPIRATION RATE: 18 BRPM | SYSTOLIC BLOOD PRESSURE: 137 MMHG | OXYGEN SATURATION: 98 % | DIASTOLIC BLOOD PRESSURE: 81 MMHG

## 2022-09-29 DIAGNOSIS — R73.03 PREDIABETES: Primary | ICD-10-CM

## 2022-09-29 DIAGNOSIS — I10 ESSENTIAL HYPERTENSION WITH GOAL BLOOD PRESSURE LESS THAN 140/90: ICD-10-CM

## 2022-09-29 DIAGNOSIS — I10 ESSENTIAL HYPERTENSION: ICD-10-CM

## 2022-09-29 DIAGNOSIS — F41.9 ANXIETY: ICD-10-CM

## 2022-09-29 PROCEDURE — 1101F PT FALLS ASSESS-DOCD LE1/YR: CPT | Performed by: FAMILY MEDICINE

## 2022-09-29 PROCEDURE — G8417 CALC BMI ABV UP PARAM F/U: HCPCS | Performed by: FAMILY MEDICINE

## 2022-09-29 PROCEDURE — G8754 DIAS BP LESS 90: HCPCS | Performed by: FAMILY MEDICINE

## 2022-09-29 PROCEDURE — G8536 NO DOC ELDER MAL SCRN: HCPCS | Performed by: FAMILY MEDICINE

## 2022-09-29 PROCEDURE — 3017F COLORECTAL CA SCREEN DOC REV: CPT | Performed by: FAMILY MEDICINE

## 2022-09-29 PROCEDURE — 99214 OFFICE O/P EST MOD 30 MIN: CPT | Performed by: FAMILY MEDICINE

## 2022-09-29 PROCEDURE — 1090F PRES/ABSN URINE INCON ASSESS: CPT | Performed by: FAMILY MEDICINE

## 2022-09-29 PROCEDURE — G8399 PT W/DXA RESULTS DOCUMENT: HCPCS | Performed by: FAMILY MEDICINE

## 2022-09-29 PROCEDURE — G8427 DOCREV CUR MEDS BY ELIG CLIN: HCPCS | Performed by: FAMILY MEDICINE

## 2022-09-29 PROCEDURE — G9899 SCRN MAM PERF RSLTS DOC: HCPCS | Performed by: FAMILY MEDICINE

## 2022-09-29 PROCEDURE — G0463 HOSPITAL OUTPT CLINIC VISIT: HCPCS | Performed by: FAMILY MEDICINE

## 2022-09-29 PROCEDURE — G8432 DEP SCR NOT DOC, RNG: HCPCS | Performed by: FAMILY MEDICINE

## 2022-09-29 PROCEDURE — 1123F ACP DISCUSS/DSCN MKR DOCD: CPT | Performed by: FAMILY MEDICINE

## 2022-09-29 PROCEDURE — G8752 SYS BP LESS 140: HCPCS | Performed by: FAMILY MEDICINE

## 2022-09-29 RX ORDER — VENLAFAXINE HYDROCHLORIDE 37.5 MG/1
CAPSULE, EXTENDED RELEASE ORAL
Qty: 90 CAPSULE | Refills: 3 | Status: SHIPPED | OUTPATIENT
Start: 2022-09-29

## 2022-09-29 RX ORDER — AMLODIPINE BESYLATE 5 MG/1
5 TABLET ORAL DAILY
Qty: 90 TABLET | Refills: 3 | Status: SHIPPED | OUTPATIENT
Start: 2022-09-29

## 2022-09-29 RX ORDER — HYDROCHLOROTHIAZIDE 25 MG/1
25 TABLET ORAL DAILY
Qty: 90 TABLET | Refills: 3 | Status: SHIPPED | OUTPATIENT
Start: 2022-09-29

## 2022-09-29 NOTE — PROGRESS NOTES
Marilee Huitron is a 75 y.o. female    Chief Complaint   Patient presents with    Blood Pressure Check     Doesn't check BP at home regularly. Denies chest pain, dizziness, headaches, and n/v       Visit Vitals  /81 (BP 1 Location: Left upper arm, BP Patient Position: Sitting, BP Cuff Size: Large adult)   Pulse 81   Resp 18   Ht 5' 4\" (1.626 m)   Wt 164 lb 6.4 oz (74.6 kg)   SpO2 98%   BMI 28.22 kg/m²       1. \"Have you been to the ER, urgent care clinic since your last visit? Hospitalized since your last visit? \" No    2. \"Have you seen or consulted any other health care providers outside of the [address] [place] [address] since your last visit? \" No     3. For patients aged 39-70: Has the patient had a colonoscopy / FIT/ Cologuard? Yes - Care Gap present. Most recent result on file      If the patient is female:    4. For patients aged 41-77: Has the patient had a mammogram within the past 2 years? Yes - no Care Gap present      5. For patients aged 21-65: Has the patient had a pap smear? NA - based on age or sex      Health Maintenance Due   Topic Date Due    COVID-19 Vaccine (3 - Booster for Moderna series) 10/18/2021    Depression Screen  04/16/2022    Medicare Yearly Exam  04/17/2022    Flu Vaccine (1) 08/01/2022    Breast Cancer Screen Mammogram  10/25/2022         Medication Reconciliation completed, changes noted.   Please update medication list.

## 2022-09-29 NOTE — PROGRESS NOTES
[id number] [facility]      Chief Complaint:   Chief Complaint   Patient presents with    Blood Pressure Check     Doesn't check BP at home regularly. Denies chest pain, dizziness, headaches, and n/v       SUBJECTIVE:  Anita Dudley is a 75 y.o. female who presents for:  HTN: Currently taking Amlodipine 5mg daily and HCTZ 25mg daily. She reports missing last nights norvasc dose but otherwise takes her medications as prescribed. Denies cp/sob. PreDM: Reports good diet. Exercises regularly. Anxiety:  Reports sx are well controlled. Currently taking Effexor XR 37.5mg daily. Denies side effects of the medication. Denies SI/HI. Post-op Rotator Cuff Repair: S/p rotator cuff repair. Currently in sling. Followed by Melodie Vickers. Reports pain well controlled. Currently not taking any pain medication. Only used tylenol immediately post op. No complaints today. PMHx:  Past Medical History:   Diagnosis Date    Asthma     Hypertension     Pneumonia 09/21/2017       Meds:   Current Outpatient Medications   Medication Sig Dispense Refill    amLODIPine (NORVASC) 5 mg tablet Take 1 Tablet by mouth daily. 90 Tablet 3    hydroCHLOROthiazide (HYDRODIURIL) 25 mg tablet Take 1 Tablet by mouth daily. 90 Tablet 3    venlafaxine-SR (EFFEXOR-XR) 37.5 mg capsule TAKE ONE CAPSULE BY MOUTH ONE TIME DAILY 90 Capsule 3    potassium chloride (KLOR-CON M10) 10 mEq tablet Take 1 Tablet by mouth two (2) times a day. 180 Tablet 0    multivitamin (ONE A DAY) tablet Take 1 Tab by mouth daily. Allergies:    Allergies   Allergen Reactions    Other Plant, Animal, Environmental Shortness of Breath     CATS    Erythromycin Nausea Only       Smoker:  Social History     Tobacco Use   Smoking Status Never   Smokeless Tobacco Never       ETOH:   Social History     Substance and Sexual Activity   Alcohol Use Yes    Alcohol/week: 1.0 standard drink    Types: 1 Glasses of wine per week    Comment: rarely       FH:   Family History   Problem Relation Age of Onset    Breast Cancer Mother 59    Hypertension Father     Cancer Sister     Colon Cancer Sister     Psychiatric Disorder Brother         schizophrenia    Cancer Other     Colon Cancer Other        ROS: Per HPI    Physical Exam:  Visit Vitals  /81 (BP 1 Location: Left upper arm, BP Patient Position: Sitting, BP Cuff Size: Large adult)   Pulse 81   Resp 18   Ht 5' 4\" (1.626 m)   Wt 164 lb 6.4 oz (74.6 kg)   SpO2 98%   BMI 28.22 kg/m²     GEN: No apparent distress. Alert and oriented and responds to all questions appropriately. EYES:  Conjunctiva clear;          LUNGS: Respirations unlabored; clear to auscultation bilaterally  CARDIOVASCULAR: Regular, rate, and rhythm without murmurs, gallops or rubs   NEUROLOGIC:  No focal neurologic deficits. EXT: Well perfused. No edema. SKIN: No obvious rashes. Assessment:    ICD-10-CM ICD-9-CM    1. Prediabetes  R73.03 790.29 CBC W/O DIFF      METABOLIC PANEL, COMPREHENSIVE      LIPID PANEL      TSH 3RD GENERATION      HEMOGLOBIN A1C WITH EAG      2. Essential hypertension  I10 401.9 amLODIPine (NORVASC) 5 mg tablet      hydroCHLOROthiazide (HYDRODIURIL) 25 mg tablet      CBC W/O DIFF      METABOLIC PANEL, COMPREHENSIVE      LIPID PANEL      TSH 3RD GENERATION      3. Anxiety  F41.9 300.00 venlafaxine-SR (EFFEXOR-XR) 37.5 mg capsule      4. Essential hypertension with goal blood pressure less than 140/90  I10 401.9 CBC W/O DIFF      METABOLIC PANEL, COMPREHENSIVE      LIPID PANEL      TSH 3RD GENERATION          Plan:  HTN: Continue taking Amlodipine 5mg daily and HCTZ 25mg daily. Keep home BP log and bring to next visit for review. PreDM: Continue to manage with diet and exercise. Labs today. Anxiety:  Controlled. Continue Effexor XR 37.5mg daily. Post-op Rotator Cuff Repair: Doing well post-op. Continue to follow up with Ortho. Labs today.    [facility]  RTC: 6 months and PRN

## 2022-09-30 ENCOUNTER — TRANSCRIBE ORDER (OUTPATIENT)
Dept: SCHEDULING | Age: 67
End: 2022-09-30

## 2022-09-30 DIAGNOSIS — Z12.31 SCREENING MAMMOGRAM FOR HIGH-RISK PATIENT: Primary | ICD-10-CM

## 2022-09-30 LAB
ALBUMIN SERPL-MCNC: 4 G/DL (ref 3.5–5)
ALBUMIN/GLOB SERPL: 1.2 {RATIO} (ref 1.1–2.2)
ALP SERPL-CCNC: 124 U/L (ref 45–117)
ALT SERPL-CCNC: 27 U/L (ref 12–78)
ANION GAP SERPL CALC-SCNC: 5 MMOL/L (ref 5–15)
AST SERPL-CCNC: 14 U/L (ref 15–37)
BILIRUB SERPL-MCNC: 0.5 MG/DL (ref 0.2–1)
BUN SERPL-MCNC: 13 MG/DL (ref 6–20)
BUN/CREAT SERPL: 18 (ref 12–20)
CALCIUM SERPL-MCNC: 9.6 MG/DL (ref 8.5–10.1)
CHLORIDE SERPL-SCNC: 99 MMOL/L (ref 97–108)
CHOLEST SERPL-MCNC: 233 MG/DL
CO2 SERPL-SCNC: 33 MMOL/L (ref 21–32)
CREAT SERPL-MCNC: 0.74 MG/DL (ref 0.55–1.02)
ERYTHROCYTE [DISTWIDTH] IN BLOOD BY AUTOMATED COUNT: 13.2 % (ref 11.5–14.5)
EST. AVERAGE GLUCOSE BLD GHB EST-MCNC: 123 MG/DL
GLOBULIN SER CALC-MCNC: 3.4 G/DL (ref 2–4)
GLUCOSE SERPL-MCNC: 91 MG/DL (ref 65–100)
HBA1C MFR BLD: 5.9 % (ref 4–5.6)
HCT VFR BLD AUTO: 44.9 % (ref 35–47)
HDLC SERPL-MCNC: 95 MG/DL
HDLC SERPL: 2.5 {RATIO} (ref 0–5)
HGB BLD-MCNC: 14.6 G/DL (ref 11.5–16)
LDLC SERPL CALC-MCNC: 124 MG/DL (ref 0–100)
MCH RBC QN AUTO: 31.7 PG (ref 26–34)
MCHC RBC AUTO-ENTMCNC: 32.5 G/DL (ref 30–36.5)
MCV RBC AUTO: 97.4 FL (ref 80–99)
NRBC # BLD: 0 K/UL (ref 0–0.01)
NRBC BLD-RTO: 0 PER 100 WBC
PLATELET # BLD AUTO: 386 K/UL (ref 150–400)
PMV BLD AUTO: 10.2 FL (ref 8.9–12.9)
POTASSIUM SERPL-SCNC: 3.4 MMOL/L (ref 3.5–5.1)
PROT SERPL-MCNC: 7.4 G/DL (ref 6.4–8.2)
RBC # BLD AUTO: 4.61 M/UL (ref 3.8–5.2)
SODIUM SERPL-SCNC: 137 MMOL/L (ref 136–145)
TRIGL SERPL-MCNC: 70 MG/DL (ref ?–150)
TSH SERPL DL<=0.05 MIU/L-ACNC: 1.59 UIU/ML (ref 0.36–3.74)
VLDLC SERPL CALC-MCNC: 14 MG/DL
WBC # BLD AUTO: 9.3 K/UL (ref 3.6–11)

## 2022-11-02 ENCOUNTER — HOSPITAL ENCOUNTER (OUTPATIENT)
Dept: MAMMOGRAPHY | Age: 67
Discharge: HOME | End: 2022-11-02
Attending: FAMILY MEDICINE
Payer: MEDICARE

## 2022-11-02 DIAGNOSIS — Z12.31 SCREENING MAMMOGRAM FOR HIGH-RISK PATIENT: ICD-10-CM

## 2022-11-02 PROCEDURE — 77063 BREAST TOMOSYNTHESIS BI: CPT

## 2023-01-03 RX ORDER — POTASSIUM CHLORIDE 750 MG/1
10 TABLET, EXTENDED RELEASE ORAL 2 TIMES DAILY
Qty: 180 TABLET | Refills: 0 | Status: SHIPPED | OUTPATIENT
Start: 2023-01-03

## 2023-01-17 DIAGNOSIS — I10 ESSENTIAL HYPERTENSION: ICD-10-CM

## 2023-01-17 DIAGNOSIS — F41.9 ANXIETY: ICD-10-CM

## 2023-01-17 RX ORDER — HYDROCHLOROTHIAZIDE 25 MG/1
25 TABLET ORAL DAILY
Qty: 90 TABLET | Refills: 3 | Status: SHIPPED | OUTPATIENT
Start: 2023-01-17

## 2023-01-17 RX ORDER — VENLAFAXINE HYDROCHLORIDE 37.5 MG/1
CAPSULE, EXTENDED RELEASE ORAL
Qty: 90 CAPSULE | Refills: 3 | Status: SHIPPED | OUTPATIENT
Start: 2023-01-17

## 2023-01-17 RX ORDER — AMLODIPINE BESYLATE 5 MG/1
5 TABLET ORAL DAILY
Qty: 90 TABLET | Refills: 3 | Status: SHIPPED | OUTPATIENT
Start: 2023-01-17

## 2023-03-15 ENCOUNTER — NURSE TRIAGE (OUTPATIENT)
Dept: OTHER | Facility: CLINIC | Age: 68
End: 2023-03-15

## 2023-03-15 ENCOUNTER — OFFICE VISIT (OUTPATIENT)
Dept: FAMILY MEDICINE CLINIC | Age: 68
End: 2023-03-15
Payer: MEDICARE

## 2023-03-15 VITALS
SYSTOLIC BLOOD PRESSURE: 131 MMHG | HEART RATE: 76 BPM | TEMPERATURE: 97.8 F | RESPIRATION RATE: 18 BRPM | WEIGHT: 166.4 LBS | OXYGEN SATURATION: 97 % | BODY MASS INDEX: 28.41 KG/M2 | HEIGHT: 64 IN | DIASTOLIC BLOOD PRESSURE: 72 MMHG

## 2023-03-15 DIAGNOSIS — Z77.29 DUST EXPOSURE: Primary | ICD-10-CM

## 2023-03-15 DIAGNOSIS — T78.40XA ALLERGY, INITIAL ENCOUNTER: ICD-10-CM

## 2023-03-15 PROCEDURE — G8427 DOCREV CUR MEDS BY ELIG CLIN: HCPCS | Performed by: STUDENT IN AN ORGANIZED HEALTH CARE EDUCATION/TRAINING PROGRAM

## 2023-03-15 PROCEDURE — G8432 DEP SCR NOT DOC, RNG: HCPCS | Performed by: STUDENT IN AN ORGANIZED HEALTH CARE EDUCATION/TRAINING PROGRAM

## 2023-03-15 PROCEDURE — 1090F PRES/ABSN URINE INCON ASSESS: CPT | Performed by: STUDENT IN AN ORGANIZED HEALTH CARE EDUCATION/TRAINING PROGRAM

## 2023-03-15 PROCEDURE — 1101F PT FALLS ASSESS-DOCD LE1/YR: CPT | Performed by: STUDENT IN AN ORGANIZED HEALTH CARE EDUCATION/TRAINING PROGRAM

## 2023-03-15 PROCEDURE — 3017F COLORECTAL CA SCREEN DOC REV: CPT | Performed by: STUDENT IN AN ORGANIZED HEALTH CARE EDUCATION/TRAINING PROGRAM

## 2023-03-15 PROCEDURE — 99212 OFFICE O/P EST SF 10 MIN: CPT | Performed by: STUDENT IN AN ORGANIZED HEALTH CARE EDUCATION/TRAINING PROGRAM

## 2023-03-15 PROCEDURE — G0463 HOSPITAL OUTPT CLINIC VISIT: HCPCS | Performed by: STUDENT IN AN ORGANIZED HEALTH CARE EDUCATION/TRAINING PROGRAM

## 2023-03-15 PROCEDURE — 1123F ACP DISCUSS/DSCN MKR DOCD: CPT | Performed by: STUDENT IN AN ORGANIZED HEALTH CARE EDUCATION/TRAINING PROGRAM

## 2023-03-15 PROCEDURE — G8536 NO DOC ELDER MAL SCRN: HCPCS | Performed by: STUDENT IN AN ORGANIZED HEALTH CARE EDUCATION/TRAINING PROGRAM

## 2023-03-15 PROCEDURE — G8417 CALC BMI ABV UP PARAM F/U: HCPCS | Performed by: STUDENT IN AN ORGANIZED HEALTH CARE EDUCATION/TRAINING PROGRAM

## 2023-03-15 PROCEDURE — G9899 SCRN MAM PERF RSLTS DOC: HCPCS | Performed by: STUDENT IN AN ORGANIZED HEALTH CARE EDUCATION/TRAINING PROGRAM

## 2023-03-15 PROCEDURE — 3074F SYST BP LT 130 MM HG: CPT | Performed by: STUDENT IN AN ORGANIZED HEALTH CARE EDUCATION/TRAINING PROGRAM

## 2023-03-15 PROCEDURE — G8399 PT W/DXA RESULTS DOCUMENT: HCPCS | Performed by: STUDENT IN AN ORGANIZED HEALTH CARE EDUCATION/TRAINING PROGRAM

## 2023-03-15 PROCEDURE — 3078F DIAST BP <80 MM HG: CPT | Performed by: STUDENT IN AN ORGANIZED HEALTH CARE EDUCATION/TRAINING PROGRAM

## 2023-03-15 NOTE — PROGRESS NOTES
Identified pt with two pt identifiers(name and ). Reviewed record in preparation for visit and have obtained necessary documentation. Chief Complaint   Patient presents with    Shortness of Breath     Started on 3/14/23, previously had a productive cough        Health Maintenance Due   Topic    COVID-19 Vaccine (3 - Booster for Moderna series)    Depression Screen     Medicare Yearly Exam     Flu Vaccine (1)       Visit Vitals  /72 (BP 1 Location: Left upper arm, BP Patient Position: Sitting, BP Cuff Size: Adult)   Pulse 76   Temp 97.8 °F (36.6 °C) (Oral)   Resp 18   Ht 5' 4\" (1.626 m)   Wt 166 lb 6.4 oz (75.5 kg)   SpO2 97%   BMI 28.56 kg/m²         Coordination of Care Questionnaire:  :   1) Have you been to an emergency room, urgent care, or hospitalized since your last visit? If yes, where when, and reason for visit? no       2. Have seen or consulted any other health care provider since your last visit? If yes, where when, and reason for visit? NO        Patient is accompanied by self I have received verbal consent from Bobo Friend to discuss any/all medical information while they are present in the room.

## 2023-03-15 NOTE — TELEPHONE ENCOUNTER
[facility]    Received call from Fredjay Evelyn at [facility] with Red Flag Complaint. Subjective: Caller states \"The cough started a week ago, last week. I was treating it with high blood pressure cough suppressant medication. I had a lot of coughing, runny nose, but I didn't have any restricted breathing. That cleared up. So I backed off on the medication. A few days ago I started with the restricted breathing. I have a little bit of a cough and a runny nose. But my biggest concern is the restricted breathing. I can't get a full breath. I've also been spring cleaning and I'm allergic to dust.\"     Current Symptoms: cough-improving, restricted breathing-difficulty getting a full breath, runny nose, chest tightness, NO wheezing or stridor    Hx of Asthma, Dust allergy, Pneumonia in 2017    Onset: 1 week ago; worsening    Associated Symptoms: reduced activity    Pain Severity: Denies    Temperature: Denies    What has been tried: OTC cough medication    LMP: NA Pregnant: NA    Recommended disposition: Go to ED Now. Patient refusing. Writer reinforced d/t concern for current difficulty breathing. Caller verbalized understanding and still requesting to be seen in office. Care advice provided, patient verbalizes understanding; denies any other questions or concerns; instructed to call back for any new or worsening symptoms. Writer provided warm transfer to [facility] at [address] for Refusal of Urgent Disposition. Attention Provider: Thank you for allowing me to participate in the care of your patient. The patient was connected to triage in response to information provided to the [facility]. Please do not respond through this encounter as the response is not directed to a shared pool.     Reason for Disposition   MODERATE difficulty breathing (e.g., speaks in phrases, SOB even at rest, pulse 100-120) of new-onset or worse than normal    Protocols used: Breathing Difficulty-ADULT-[place]

## 2023-03-15 NOTE — PROGRESS NOTES
Subjective  CC: Edmonia Rubinstein is an 75 y.o. female with a PMHx of HTN and depression who presents for dry cough/allergies evaluation. Onset: One day ago. Reports dry cough of acute onset after doing \"spring cleaning all day\". She experienced a sensation of difficulty breathing as well. Symptoms have improved significantly by today and pt did not want to cancel her appt. Denies SOB, CP, cough is much better. No LE edema. No palpitations. No other issues. Allergies - reviewed: Allergies   Allergen Reactions    Other Plant, Animal, Environmental Shortness of Breath     CATS    Erythromycin Nausea Only         Medications - reviewed:   Current Outpatient Medications   Medication Sig    amLODIPine (NORVASC) 5 mg tablet Take 1 Tablet by mouth daily. hydroCHLOROthiazide (HYDRODIURIL) 25 mg tablet Take 1 Tablet by mouth daily. venlafaxine-SR (EFFEXOR-XR) 37.5 mg capsule TAKE ONE CAPSULE BY MOUTH ONE TIME DAILY    potassium chloride (KLOR-CON M10) 10 mEq tablet Take 1 Tablet by mouth two (2) times a day. multivitamin (ONE A DAY) tablet Take 1 Tab by mouth daily. No current facility-administered medications for this visit. Past Medical History - reviewed:  Past Medical History:   Diagnosis Date    Asthma     Hypertension     Pneumonia 09/21/2017     ROS  Negative besides what is written in HPI. Physical Exam  Visit Vitals  /72 (BP 1 Location: Left upper arm, BP Patient Position: Sitting, BP Cuff Size: Adult)   Pulse 76   Temp 97.8 °F (36.6 °C) (Oral)   Resp 18   Ht 5' 4\" (1.626 m)   Wt 166 lb 6.4 oz (75.5 kg)   SpO2 97%   BMI 28.56 kg/m²     General: No acute distress. Alert. Cooperative. Head: Normocephalic. Atraumatic. Throat: Mucosa pink. Moist mucous membranes. Respiratory: CTAB. No w/r/r/c.  Cardiovascular: RRR. Normal S1,S2. No m/r/g. Extremities: No LE edema. Distal pulses present. Musculoskeletal: Full ROM in all extremities. Skin: Warm, dry. No rashes. Neuro: Alert and oriented. Assessment/Plan  1. Dust exposure/Allergies: Dry cough and dyspnea after cleaning. Sx have improved. Pt feeling well and came as she did not want to cancel her appt. - Reassurance provided  - Can take OTC anti allergy medication.   - Advised to use a mask and goggles while cleaning.   - RTC as needed. - ER precautions given. I have discussed the aforementioned diagnoses and plan with the patient in detail. I have provided information in person and/or in AVS. All questions answered prior to discharge.     Nghia Rivera MD  Family Medicine Resident

## 2023-04-05 RX ORDER — POTASSIUM CHLORIDE 750 MG/1
10 TABLET, EXTENDED RELEASE ORAL 2 TIMES DAILY
Qty: 180 TABLET | Refills: 0 | Status: SHIPPED
Start: 2023-04-05

## 2023-05-22 RX ORDER — HYDROCHLOROTHIAZIDE 25 MG/1
25 TABLET ORAL DAILY
COMMUNITY
Start: 2023-01-17

## 2023-05-22 RX ORDER — AMLODIPINE BESYLATE 5 MG/1
5 TABLET ORAL DAILY
COMMUNITY
Start: 2023-01-17

## 2023-05-22 RX ORDER — VENLAFAXINE HYDROCHLORIDE 37.5 MG/1
CAPSULE, EXTENDED RELEASE ORAL
COMMUNITY
Start: 2023-01-17

## 2023-05-22 RX ORDER — POTASSIUM CHLORIDE 750 MG/1
10 TABLET, EXTENDED RELEASE ORAL 2 TIMES DAILY
COMMUNITY
Start: 2023-04-05

## 2023-06-09 ENCOUNTER — OFFICE VISIT (OUTPATIENT)
Age: 68
End: 2023-06-09
Payer: MEDICARE

## 2023-06-09 VITALS
HEIGHT: 64 IN | OXYGEN SATURATION: 96 % | BODY MASS INDEX: 28.27 KG/M2 | WEIGHT: 165.6 LBS | HEART RATE: 83 BPM | RESPIRATION RATE: 18 BRPM | SYSTOLIC BLOOD PRESSURE: 121 MMHG | TEMPERATURE: 98.4 F | DIASTOLIC BLOOD PRESSURE: 74 MMHG

## 2023-06-09 DIAGNOSIS — E78.5 HYPERLIPIDEMIA, UNSPECIFIED HYPERLIPIDEMIA TYPE: ICD-10-CM

## 2023-06-09 DIAGNOSIS — I10 ESSENTIAL (PRIMARY) HYPERTENSION: ICD-10-CM

## 2023-06-09 DIAGNOSIS — R73.03 PREDIABETES: Primary | ICD-10-CM

## 2023-06-09 LAB
ANION GAP SERPL CALC-SCNC: 5 MMOL/L (ref 5–15)
BUN SERPL-MCNC: 19 MG/DL (ref 6–20)
BUN/CREAT SERPL: 21 (ref 12–20)
CALCIUM SERPL-MCNC: 9.9 MG/DL (ref 8.5–10.1)
CHLORIDE SERPL-SCNC: 100 MMOL/L (ref 97–108)
CHOLEST SERPL-MCNC: 248 MG/DL
CO2 SERPL-SCNC: 32 MMOL/L (ref 21–32)
CREAT SERPL-MCNC: 0.89 MG/DL (ref 0.55–1.02)
GLUCOSE SERPL-MCNC: 106 MG/DL (ref 65–100)
HDLC SERPL-MCNC: 107 MG/DL
HDLC SERPL: 2.3 (ref 0–5)
LDLC SERPL CALC-MCNC: 127.4 MG/DL (ref 0–100)
POTASSIUM SERPL-SCNC: 4.3 MMOL/L (ref 3.5–5.1)
SODIUM SERPL-SCNC: 137 MMOL/L (ref 136–145)
TRIGL SERPL-MCNC: 68 MG/DL
VLDLC SERPL CALC-MCNC: 13.6 MG/DL

## 2023-06-09 PROCEDURE — G8399 PT W/DXA RESULTS DOCUMENT: HCPCS | Performed by: FAMILY MEDICINE

## 2023-06-09 PROCEDURE — 3017F COLORECTAL CA SCREEN DOC REV: CPT | Performed by: FAMILY MEDICINE

## 2023-06-09 PROCEDURE — 99214 OFFICE O/P EST MOD 30 MIN: CPT | Performed by: FAMILY MEDICINE

## 2023-06-09 PROCEDURE — 1123F ACP DISCUSS/DSCN MKR DOCD: CPT | Performed by: FAMILY MEDICINE

## 2023-06-09 PROCEDURE — 1090F PRES/ABSN URINE INCON ASSESS: CPT | Performed by: FAMILY MEDICINE

## 2023-06-09 PROCEDURE — 3078F DIAST BP <80 MM HG: CPT | Performed by: FAMILY MEDICINE

## 2023-06-09 PROCEDURE — 3074F SYST BP LT 130 MM HG: CPT | Performed by: FAMILY MEDICINE

## 2023-06-09 PROCEDURE — G8419 CALC BMI OUT NRM PARAM NOF/U: HCPCS | Performed by: FAMILY MEDICINE

## 2023-06-09 PROCEDURE — G8427 DOCREV CUR MEDS BY ELIG CLIN: HCPCS | Performed by: FAMILY MEDICINE

## 2023-06-09 PROCEDURE — 1036F TOBACCO NON-USER: CPT | Performed by: FAMILY MEDICINE

## 2023-06-09 RX ORDER — FEXOFENADINE HCL 180 MG/1
180 TABLET ORAL AS NEEDED
COMMUNITY

## 2023-06-09 SDOH — ECONOMIC STABILITY: HOUSING INSECURITY
IN THE LAST 12 MONTHS, WAS THERE A TIME WHEN YOU DID NOT HAVE A STEADY PLACE TO SLEEP OR SLEPT IN A SHELTER (INCLUDING NOW)?: NO

## 2023-06-09 SDOH — ECONOMIC STABILITY: TRANSPORTATION INSECURITY
IN THE PAST 12 MONTHS, HAS LACK OF TRANSPORTATION KEPT YOU FROM MEETINGS, WORK, OR FROM GETTING THINGS NEEDED FOR DAILY LIVING?: NO

## 2023-06-09 SDOH — ECONOMIC STABILITY: FOOD INSECURITY: WITHIN THE PAST 12 MONTHS, YOU WORRIED THAT YOUR FOOD WOULD RUN OUT BEFORE YOU GOT MONEY TO BUY MORE.: NEVER TRUE

## 2023-06-09 SDOH — ECONOMIC STABILITY: FOOD INSECURITY: WITHIN THE PAST 12 MONTHS, THE FOOD YOU BOUGHT JUST DIDN'T LAST AND YOU DIDN'T HAVE MONEY TO GET MORE.: NEVER TRUE

## 2023-06-09 SDOH — ECONOMIC STABILITY: INCOME INSECURITY: HOW HARD IS IT FOR YOU TO PAY FOR THE VERY BASICS LIKE FOOD, HOUSING, MEDICAL CARE, AND HEATING?: NOT HARD AT ALL

## 2023-06-09 ASSESSMENT — PATIENT HEALTH QUESTIONNAIRE - PHQ9
SUM OF ALL RESPONSES TO PHQ QUESTIONS 1-9: 0
2. FEELING DOWN, DEPRESSED OR HOPELESS: 0
SUM OF ALL RESPONSES TO PHQ9 QUESTIONS 1 & 2: 0
SUM OF ALL RESPONSES TO PHQ QUESTIONS 1-9: 0
1. LITTLE INTEREST OR PLEASURE IN DOING THINGS: 0

## 2023-06-09 NOTE — PROGRESS NOTES
Identified pt with two pt identifiers(name and ). Reviewed record in preparation for visit and have obtained necessary documentation. Chief Complaint   Patient presents with    Follow-up Chronic Condition        Health Maintenance Due   Topic    COVID-19 Vaccine (3 - Booster for Moderna series)    Annual Wellness Visit (AWV)        Vitals:    23 1012   BP: 121/74   Site: Left Upper Arm   Position: Sitting   Cuff Size: Medium Adult   Pulse: 83   Resp: 18   Temp: 98.4 °F (36.9 °C)   TempSrc: Oral   SpO2: 96%   Weight: 165 lb 9.6 oz (75.1 kg)   Height: 5' 4\" (1.626 m)           Coordination of Care Questionnaire:  :   1. Have you been to the ER, urgent care clinic since your last visit? Hospitalized since your last visit? No    2. Have you seen or consulted any other health care providers outside of the [address] [place] [address] since your last visit? Include any pap smears or colon screening. No    This patient is accompanied in the office by her self. I have received verbal consent from Francoise Garcia to discuss any/all medical information while they are present in the room.
manage. A1c today.     RTC: 6 months and PRN

## 2023-06-10 LAB
EST. AVERAGE GLUCOSE BLD GHB EST-MCNC: 114 MG/DL
HBA1C MFR BLD: 5.6 % (ref 4–5.6)

## 2023-10-05 ENCOUNTER — TRANSCRIBE ORDERS (OUTPATIENT)
Facility: HOSPITAL | Age: 68
End: 2023-10-05

## 2023-10-05 DIAGNOSIS — Z12.31 VISIT FOR SCREENING MAMMOGRAM: Primary | ICD-10-CM

## 2023-10-15 RX ORDER — POTASSIUM CHLORIDE 750 MG/1
10 TABLET, EXTENDED RELEASE ORAL 2 TIMES DAILY
Qty: 180 TABLET | Refills: 0 | Status: SHIPPED | OUTPATIENT
Start: 2023-10-15

## 2023-10-24 ENCOUNTER — ANESTHESIA EVENT (OUTPATIENT)
Facility: HOSPITAL | Age: 68
End: 2023-10-24
Payer: MEDICARE

## 2023-10-24 ENCOUNTER — HOSPITAL ENCOUNTER (OUTPATIENT)
Facility: HOSPITAL | Age: 68
Setting detail: OUTPATIENT SURGERY
Discharge: HOME | End: 2023-10-24
Attending: SPECIALIST | Admitting: SPECIALIST
Payer: MEDICARE

## 2023-10-24 ENCOUNTER — ANESTHESIA (OUTPATIENT)
Facility: HOSPITAL | Age: 68
End: 2023-10-24
Payer: MEDICARE

## 2023-10-24 VITALS
HEART RATE: 75 BPM | OXYGEN SATURATION: 98 % | RESPIRATION RATE: 16 BRPM | WEIGHT: 164 LBS | DIASTOLIC BLOOD PRESSURE: 65 MMHG | BODY MASS INDEX: 28 KG/M2 | HEIGHT: 64 IN | SYSTOLIC BLOOD PRESSURE: 108 MMHG | TEMPERATURE: 97.7 F

## 2023-10-24 PROCEDURE — 2580000003 HC RX 258: Performed by: SPECIALIST

## 2023-10-24 PROCEDURE — 2709999900 HC NON-CHARGEABLE SUPPLY: Performed by: SPECIALIST

## 2023-10-24 PROCEDURE — 3700000000 HC ANESTHESIA ATTENDED CARE: Performed by: SPECIALIST

## 2023-10-24 PROCEDURE — 7100000010 HC PHASE II RECOVERY - FIRST 15 MIN: Performed by: SPECIALIST

## 2023-10-24 PROCEDURE — 2500000003 HC RX 250 WO HCPCS: Performed by: NURSE ANESTHETIST, CERTIFIED REGISTERED

## 2023-10-24 PROCEDURE — 88305 TISSUE EXAM BY PATHOLOGIST: CPT

## 2023-10-24 PROCEDURE — 6360000002 HC RX W HCPCS: Performed by: NURSE ANESTHETIST, CERTIFIED REGISTERED

## 2023-10-24 PROCEDURE — 2580000003 HC RX 258: Performed by: NURSE ANESTHETIST, CERTIFIED REGISTERED

## 2023-10-24 PROCEDURE — 3600007512: Performed by: SPECIALIST

## 2023-10-24 PROCEDURE — 7100000011 HC PHASE II RECOVERY - ADDTL 15 MIN: Performed by: SPECIALIST

## 2023-10-24 PROCEDURE — 3700000001 HC ADD 15 MINUTES (ANESTHESIA): Performed by: SPECIALIST

## 2023-10-24 PROCEDURE — 3600007502: Performed by: SPECIALIST

## 2023-10-24 RX ORDER — SODIUM CHLORIDE 0.9 % (FLUSH) 0.9 %
5-40 SYRINGE (ML) INJECTION PRN
Status: DISCONTINUED | OUTPATIENT
Start: 2023-10-24 | End: 2023-10-24 | Stop reason: HOSPADM

## 2023-10-24 RX ORDER — SODIUM CHLORIDE 0.9 % (FLUSH) 0.9 %
5-40 SYRINGE (ML) INJECTION EVERY 12 HOURS SCHEDULED
Status: DISCONTINUED | OUTPATIENT
Start: 2023-10-24 | End: 2023-10-24 | Stop reason: HOSPADM

## 2023-10-24 RX ORDER — 0.9 % SODIUM CHLORIDE 0.9 %
INTRAVENOUS SOLUTION INTRAVENOUS PRN
Status: DISCONTINUED | OUTPATIENT
Start: 2023-10-24 | End: 2023-10-24 | Stop reason: SDUPTHER

## 2023-10-24 RX ORDER — LIDOCAINE HYDROCHLORIDE 20 MG/ML
INJECTION, SOLUTION EPIDURAL; INFILTRATION; INTRACAUDAL; PERINEURAL PRN
Status: DISCONTINUED | OUTPATIENT
Start: 2023-10-24 | End: 2023-10-24 | Stop reason: SDUPTHER

## 2023-10-24 RX ORDER — SODIUM CHLORIDE 9 MG/ML
25 INJECTION, SOLUTION INTRAVENOUS PRN
Status: DISCONTINUED | OUTPATIENT
Start: 2023-10-24 | End: 2023-10-24 | Stop reason: HOSPADM

## 2023-10-24 RX ADMIN — SODIUM CHLORIDE 300 ML: 900 INJECTION, SOLUTION INTRAVENOUS at 13:01

## 2023-10-24 RX ADMIN — PROPOFOL 50 MG: 10 INJECTION, EMULSION INTRAVENOUS at 12:50

## 2023-10-24 RX ADMIN — PROPOFOL 50 MG: 10 INJECTION, EMULSION INTRAVENOUS at 12:47

## 2023-10-24 RX ADMIN — LIDOCAINE HYDROCHLORIDE 50 MG: 20 INJECTION, SOLUTION EPIDURAL; INFILTRATION; INTRACAUDAL; PERINEURAL at 12:37

## 2023-10-24 RX ADMIN — PROPOFOL 50 MG: 10 INJECTION, EMULSION INTRAVENOUS at 12:41

## 2023-10-24 RX ADMIN — SODIUM CHLORIDE 25 ML: 9 INJECTION, SOLUTION INTRAVENOUS at 12:12

## 2023-10-24 RX ADMIN — PROPOFOL 100 MG: 10 INJECTION, EMULSION INTRAVENOUS at 12:37

## 2023-10-24 RX ADMIN — PROPOFOL 50 MG: 10 INJECTION, EMULSION INTRAVENOUS at 12:44

## 2023-10-24 RX ADMIN — PROPOFOL 50 MG: 10 INJECTION, EMULSION INTRAVENOUS at 12:55

## 2023-10-24 ASSESSMENT — PAIN - FUNCTIONAL ASSESSMENT: PAIN_FUNCTIONAL_ASSESSMENT: NONE - DENIES PAIN

## 2023-10-24 NOTE — ANESTHESIA POSTPROCEDURE EVALUATION
Department of Anesthesiology  Postprocedure Note    Patient: Nubia Srivastava  MRN: [id number]  YOB: 1955  Date of evaluation: 10/24/2023      Procedure Summary     Date: 10/24/23 Room / Location: [facility] ENDO 01 / [facility] ENDOSCOPY    Anesthesia Start: 8194 Anesthesia Stop: 7727    Procedure: COLONOSCOPY Diagnosis:       Family history of malignant neoplasm of gastrointestinal tract      First degree hemorrhoids      Personal history of colonic polyps      Residual hemorrhoidal skin tags      (Family history of malignant neoplasm of gastrointestinal tract [Z80.0])      (First degree hemorrhoids [K64.0])      (Personal history of colonic polyps [Z86.010])      (Residual hemorrhoidal skin tags [K64.4])    Surgeons: Zabrina Jones MD Responsible Provider: Rulon Bosworth, MD    Anesthesia Type: TIVA ASA Status: 2          Anesthesia Type: No value filed.     Jennifer Phase I: Jennifer Score: 10    Jennifer Phase II:        Anesthesia Post Evaluation    Patient location during evaluation: PACU  Patient participation: complete - patient participated  Level of consciousness: awake  Airway patency: patent  Nausea & Vomiting: no vomiting  Complications: no  Cardiovascular status: hemodynamically stable  Respiratory status: acceptable  Hydration status: euvolemic

## 2023-10-24 NOTE — PROGRESS NOTES
ARRIVAL INFORMATION:  Verified patient name and date of birth, scheduled procedure, and informed consent. : Benigno Webb  () contact number: [phone]  Physician and staff can share information with the . Belongings with patient include:  Clothing,None    GI FOCUSED ASSESSMENT:  Neuro: Awake, alert, oriented x4  Respiratory: even and unlabored   GI: soft and non-distended  EKG Rhythm: normal sinus rhythm    Education:Reviewed general discharge instructions and  information.

## 2023-10-24 NOTE — PROGRESS NOTES
Endoscopy Case End Note:    1302:  Procedure scope was pre-cleaned, per protocol, at bedside by PRISCA Hernández.      1302:  Report received from anesthesia - Oliver Hinojosa. See anesthesia flowsheet for intra-procedure vital signs and events.     Abd soft, non-distended    Pt then taken to recovery area and SBAR report to receiving nurse

## 2023-10-24 NOTE — ANESTHESIA PRE PROCEDURE
intravenous. Anesthetic plan and risks discussed with patient. Plan discussed with CRNA.                     Pauly Kulkarni MD   10/24/2023

## 2023-10-24 NOTE — OP NOTE
Colonoscopy Procedure Note    Indications:   Family history of coloretal cancer (screening only), Personal history of colon polyps (screening only)    Referring Physician: Jhonny Barreto MD  Anesthesia/Sedation: MAC anesthesia Propofol  Endoscopist:  Dr. Lindsey Leiva    Procedure in Detail:  Informed consent was obtained for the procedure, including sedation. Risks of perforation, hemorrhage, adverse drug reaction, and aspiration were discussed. The patient was placed in the left lateral decubitus position. Based on the pre-procedure assessment, including review of the patient's medical history, medications, allergies, and review of systems, she had been deemed to be an appropriate candidate for moderate sedation; she was therefore sedated with the medications listed above. The patient was monitored continuously with ECG tracing, pulse oximetry, blood pressure monitoring, and direct observations. A rectal examination was performed. The [id number] was inserted into the rectum and advanced under direct vision to the cecum, which was identified by the ileocecal valve and appendiceal orifice. The quality of the colonic preparation was adequate. A careful inspection was made as the colonoscope was withdrawn, including a retroflexed view of the rectum; findings and interventions are described below. Appropriate photodocumentation was obtained. Findings:    Scope advanced to the cecum. Sessile 6 mm polyp in the cecum s/p cold snare removal   Sessile 3 mm polyp at hepatic flexure s/p cold snare removal   Diffuse sigmoid diverticulosis. Small internal hemorrhoids   Normal mucosa throughout. Therapies:  see above    Specimen: Specimens were collected as described above and sent to pathology. Complications: None were encountered during the procedure. EBL: < 10 ml. Recommendations:     - Repeat colonoscopy in 3 years.     Signed By: Lindsey Leiva MD                        October

## 2023-10-24 NOTE — DISCHARGE INSTRUCTIONS
Britt [place]  [id number]  1955    COLON DISCHARGE INSTRUCTIONS  Discomfort:  Redness at IV site- apply warm compress to area; if redness or soreness persist- contact your physician  There may be a slight amount of blood passed from the rectum  Gaseous discomfort- walking, belching will help relieve any discomfort  You may not operate a vehicle for 12 hours  You may not engage in an occupation involving machinery or appliances for rest of today  You may not drink alcoholic beverages for at least 12 hours  Avoid making any critical decisions for at least 24 hour  DIET:   Regular diet. - however -  remember your colon is empty and a heavy meal will produce gas. Avoid these foods:  vegetables, fried / greasy foods, carbonated drinks for today. MEDICATIONS:        Regarding Aspirin or Nonsteroidal medications, please see below. ACTIVITY:  You may resume your normal daily activities it is recommended that you spend the remainder of the day resting -  avoid any strenuous activity. CALL M.D. ANY SIGN OF:  Increasing pain, nausea, vomiting  Abdominal distension (swelling)  New increased bleeding (oral or rectal)  Fever (chills)  Pain in chest area  Bloody discharge from nose or mouth  Shortness of breath  Tylenol as needed for pain.       Follow-up Instructions:   Call Dr. Shirin Castillo for results of procedure / biopsy in 4-5 days at telephone #  [phone]              Repeat Colonoscopy in 3 years    Findings:    Colon polyp: Removed  Diverticulosis  Internal Hemorrhoids    Patient Education on Sedation / Analgesia Administered for Procedure      For 24 hours after general anesthesia or intravenous analgesia / sedation:  Have someone responsible help you with your care  Limit your activities  Do not drive and operate hazardous machinery  Do not make important personal, legal or business decisions  Do not drink alcoholic beverages  If you have not urinated within 8 hours after discharge, please contact

## 2023-11-06 ENCOUNTER — HOSPITAL ENCOUNTER (OUTPATIENT)
Facility: HOSPITAL | Age: 68
Discharge: HOME | End: 2023-11-09
Attending: FAMILY MEDICINE
Payer: MEDICARE

## 2023-11-06 DIAGNOSIS — Z12.31 VISIT FOR SCREENING MAMMOGRAM: ICD-10-CM

## 2023-11-06 PROCEDURE — 77063 BREAST TOMOSYNTHESIS BI: CPT

## 2023-11-09 ENCOUNTER — TRANSCRIBE ORDERS (OUTPATIENT)
Facility: HOSPITAL | Age: 68
End: 2023-11-09

## 2023-11-09 DIAGNOSIS — R92.8 ABNORMAL MAMMOGRAM: Primary | ICD-10-CM

## 2023-11-20 ENCOUNTER — HOSPITAL ENCOUNTER (OUTPATIENT)
Facility: HOSPITAL | Age: 68
Discharge: HOME | End: 2023-11-23
Attending: FAMILY MEDICINE
Payer: MEDICARE

## 2023-11-20 DIAGNOSIS — R92.8 ABNORMAL MAMMOGRAM: ICD-10-CM

## 2023-11-20 PROCEDURE — 76642 ULTRASOUND BREAST LIMITED: CPT

## 2023-11-20 PROCEDURE — G0279 TOMOSYNTHESIS, MAMMO: HCPCS

## 2024-01-22 RX ORDER — POTASSIUM CHLORIDE 750 MG/1
10 TABLET, EXTENDED RELEASE ORAL 2 TIMES DAILY
Qty: 180 TABLET | Refills: 1 | Status: SHIPPED | OUTPATIENT
Start: 2024-01-22

## 2024-02-06 ENCOUNTER — PATIENT MESSAGE (OUTPATIENT)
Age: 69
End: 2024-02-06

## 2024-02-08 RX ORDER — HYDROCHLOROTHIAZIDE 25 MG/1
25 TABLET ORAL DAILY
Qty: 90 TABLET | Refills: 1 | Status: SHIPPED | OUTPATIENT
Start: 2024-02-08

## 2024-02-08 RX ORDER — AMLODIPINE BESYLATE 5 MG/1
5 TABLET ORAL DAILY
Qty: 90 TABLET | Refills: 1 | Status: SHIPPED | OUTPATIENT
Start: 2024-02-08

## 2024-02-08 RX ORDER — VENLAFAXINE HYDROCHLORIDE 37.5 MG/1
37.5 CAPSULE, EXTENDED RELEASE ORAL DAILY
Qty: 90 CAPSULE | Refills: 1 | Status: SHIPPED | OUTPATIENT
Start: 2024-02-08

## 2024-02-08 NOTE — TELEPHONE ENCOUNTER
Medication Refill Request    Ingrid Sanchez is requesting a refill of the following medication(s):   Requested Prescriptions     Pending Prescriptions Disp Refills    amLODIPine (NORVASC) 5 MG tablet 90 tablet      Sig: Take 1 tablet by mouth daily    hydroCHLOROthiazide (HYDRODIURIL) 25 MG tablet 90 tablet      Sig: Take 1 tablet by mouth daily    venlafaxine (EFFEXOR XR) 37.5 MG extended release capsule 90 capsule      Sig: Take 1 capsule by mouth daily        Listed PCP is Jose Chavarrai MD   Last provider to prescribe medication: Dr. Chavarria  Last Date of Medication Prescribed: 1/17/23  Date of Last Office Visit at [facility]: 6/9/23  FUTURE APPOINTMENT: No future appointments.    Please send refill to:    [id number] - [place], [place] - [address] [place] [address] - P [phone] - F [phone]  [address] [place] [address]  Phone: [phone] Fax: [phone]      Please review request and approve or deny with recommendations.

## 2024-02-19 RX ORDER — AMLODIPINE BESYLATE 5 MG/1
5 TABLET ORAL DAILY
Qty: 90 TABLET | Refills: 3 | OUTPATIENT
Start: 2024-02-19

## 2024-02-19 RX ORDER — HYDROCHLOROTHIAZIDE 25 MG/1
25 TABLET ORAL DAILY
Qty: 90 TABLET | Refills: 3 | OUTPATIENT
Start: 2024-02-19

## 2024-02-19 RX ORDER — VENLAFAXINE HYDROCHLORIDE 37.5 MG/1
CAPSULE, EXTENDED RELEASE ORAL
Qty: 90 CAPSULE | Refills: 3 | OUTPATIENT
Start: 2024-02-19

## 2024-03-15 ENCOUNTER — OFFICE VISIT (OUTPATIENT)
Age: 69
End: 2024-03-15
Payer: MEDICARE

## 2024-03-15 VITALS
HEART RATE: 85 BPM | DIASTOLIC BLOOD PRESSURE: 73 MMHG | WEIGHT: 165 LBS | OXYGEN SATURATION: 97 % | BODY MASS INDEX: 28.32 KG/M2 | SYSTOLIC BLOOD PRESSURE: 129 MMHG | RESPIRATION RATE: 18 BRPM

## 2024-03-15 DIAGNOSIS — I10 ESSENTIAL (PRIMARY) HYPERTENSION: Primary | ICD-10-CM

## 2024-03-15 PROCEDURE — G8419 CALC BMI OUT NRM PARAM NOF/U: HCPCS | Performed by: FAMILY MEDICINE

## 2024-03-15 PROCEDURE — 3078F DIAST BP <80 MM HG: CPT | Performed by: FAMILY MEDICINE

## 2024-03-15 PROCEDURE — 3017F COLORECTAL CA SCREEN DOC REV: CPT | Performed by: FAMILY MEDICINE

## 2024-03-15 PROCEDURE — 99213 OFFICE O/P EST LOW 20 MIN: CPT | Performed by: FAMILY MEDICINE

## 2024-03-15 PROCEDURE — 1123F ACP DISCUSS/DSCN MKR DOCD: CPT | Performed by: FAMILY MEDICINE

## 2024-03-15 PROCEDURE — G8427 DOCREV CUR MEDS BY ELIG CLIN: HCPCS | Performed by: FAMILY MEDICINE

## 2024-03-15 PROCEDURE — G8484 FLU IMMUNIZE NO ADMIN: HCPCS | Performed by: FAMILY MEDICINE

## 2024-03-15 PROCEDURE — 1090F PRES/ABSN URINE INCON ASSESS: CPT | Performed by: FAMILY MEDICINE

## 2024-03-15 PROCEDURE — 1036F TOBACCO NON-USER: CPT | Performed by: FAMILY MEDICINE

## 2024-03-15 PROCEDURE — G8399 PT W/DXA RESULTS DOCUMENT: HCPCS | Performed by: FAMILY MEDICINE

## 2024-03-15 PROCEDURE — 3074F SYST BP LT 130 MM HG: CPT | Performed by: FAMILY MEDICINE

## 2024-03-15 ASSESSMENT — PATIENT HEALTH QUESTIONNAIRE - PHQ9
SUM OF ALL RESPONSES TO PHQ QUESTIONS 1-9: 0
SUM OF ALL RESPONSES TO PHQ QUESTIONS 1-9: 0
2. FEELING DOWN, DEPRESSED OR HOPELESS: NOT AT ALL
SUM OF ALL RESPONSES TO PHQ9 QUESTIONS 1 & 2: 0
SUM OF ALL RESPONSES TO PHQ QUESTIONS 1-9: 0
SUM OF ALL RESPONSES TO PHQ QUESTIONS 1-9: 0
1. LITTLE INTEREST OR PLEASURE IN DOING THINGS: NOT AT ALL

## 2024-03-15 NOTE — PROGRESS NOTES
Denies headaches and blurred vision and dizzy spells. Stressed related to  family member being sick and caused this possible. Patient is using a wrist cuff. She normally doesn't check her BP but felt off this week so she checked it.  Chief Complaint   Patient presents with    Hypertension     Vitals:    03/15/24 1606   BP: 129/73   Pulse: 85   Resp: 18   SpO2: 97%

## 2024-03-22 NOTE — PROGRESS NOTES
[facility]   [facility]      Chief Complaint:   Chief Complaint   Patient presents with    Hypertension       SUBJECTIVE:  Ingird Sanchez is a 68 y.o. female who presents for   HTN: Currently taking Amlodipine 5mg daily and HCTZ 25mg daily.  She had noticed elevated BP at home when using her home wrist BP cuff.  No CP/SOB.  No neurological deficits.  She has had some increased stress at home. Currently asymptomatic.     PMHx:  Past Medical History:   Diagnosis Date    Asthma     currently no inhalers    Hypertension     Pneumonia 09/21/2017       Meds:   Current Outpatient Medications   Medication Sig Dispense Refill    amLODIPine (NORVASC) 5 MG tablet Take 1 tablet by mouth daily 90 tablet 1    hydroCHLOROthiazide (HYDRODIURIL) 25 MG tablet Take 1 tablet by mouth daily 90 tablet 1    venlafaxine (EFFEXOR XR) 37.5 MG extended release capsule Take 1 capsule by mouth daily 90 capsule 1    potassium chloride (KLOR-CON M) 10 MEQ extended release tablet Take 1 tablet by mouth 2 times daily 180 tablet 1    Multiple Vitamin (MULTIVITAMIN ADULT PO) Take by mouth daily      fexofenadine (ALLEGRA HIVES 24HR) 180 MG tablet Take 1 tablet by mouth as needed       No current facility-administered medications for this visit.       Allergies:   Allergies   Allergen Reactions    Other Shortness Of Breath     CATS    Seasonal Shortness Of Breath     CATS    Erythromycin Nausea Only       Smoker:  Social History     Tobacco Use   Smoking Status Never    Passive exposure: Never   Smokeless Tobacco Never       ETOH:   Social History     Substance and Sexual Activity   Alcohol Use Yes    Comment: rare-few times a year--wine       FH:   Family History   Problem Relation Age of Onset    Breast Cancer Mother 64    Cancer Mother         breast cancer passed at 64    Depression Mother     Miscarriages / Stillbirths Mother     Hypertension Father     Prostate

## 2024-03-26 ENCOUNTER — TELEPHONE (OUTPATIENT)
Age: 69
End: 2024-03-26

## 2024-03-26 NOTE — TELEPHONE ENCOUNTER
Hi Dr. Chavarria,    Patient came into clinic stating that she has labs that needed to be drawn. I did not see any orders placed. She stated that these labs are done every 6 months. If patient is due for labs please place orders so I can get patient scheduled for a lab visit.    Thank you.

## 2024-04-11 NOTE — TELEPHONE ENCOUNTER
Pt notified per Dr. Misty Snyder call patient and tell her I have called in a refill of her HCTZ but she needs to make an appointment for HTN checkup. Thanks. appt scheduled    ----- Message from Arlet Mcclure MD sent at 1/3/2019 12:38 PM EST -----  Please call patient and tell her I have called in a refill of her HCTZ but she needs to make an appointment for HTN checkup. Thanks. There were no immediate complications during the procedure.

## 2024-08-05 RX ORDER — POTASSIUM CHLORIDE 750 MG/1
10 TABLET, EXTENDED RELEASE ORAL 2 TIMES DAILY
Qty: 180 TABLET | Refills: 1 | Status: SHIPPED | OUTPATIENT
Start: 2024-08-05

## 2024-08-05 RX ORDER — HYDROCHLOROTHIAZIDE 25 MG/1
25 TABLET ORAL DAILY
Qty: 90 TABLET | Refills: 1 | Status: SHIPPED | OUTPATIENT
Start: 2024-08-05

## 2024-08-05 RX ORDER — AMLODIPINE BESYLATE 5 MG/1
5 TABLET ORAL DAILY
Qty: 90 TABLET | Refills: 1 | Status: SHIPPED | OUTPATIENT
Start: 2024-08-05

## 2024-08-05 RX ORDER — VENLAFAXINE HYDROCHLORIDE 37.5 MG/1
37.5 CAPSULE, EXTENDED RELEASE ORAL DAILY
Qty: 90 CAPSULE | Refills: 1 | Status: SHIPPED | OUTPATIENT
Start: 2024-08-05

## 2024-08-09 ENCOUNTER — TELEPHONE (OUTPATIENT)
Age: 69
End: 2024-08-09

## 2024-08-09 NOTE — TELEPHONE ENCOUNTER
Pt's  called with concerns that pt tested positive for covid and she has other underlying health issues. He is requesting to speak with someone for med advice. Pt does not have an up to date PHI form. He was informed that you would return the call to the patient on her phone at [phone].

## 2024-08-09 NOTE — TELEPHONE ENCOUNTER
Spoke with patient who identified self with two patient identifiers(name and ).    On Release of Information Form? N/A, self    Patient Active Problem List   Diagnosis    Overweight (BMI 25.0-29.9)    Hypokalemia    Reflux pharyngitis    Osteopenia of spine    CAP (community acquired pneumonia)    Cyst of left ovary    Bilateral renal cysts    Hepatic cyst    Family history of colon cancer    Prediabetes    Essential hypertension with goal blood pressure less than 140/90    Chronic midline low back pain without sciatica    History of basal cell carcinoma    SIMA (generalized anxiety disorder)    Preventative health care    Nephrolithiasis    Pneumonia       Past Surgical History:   Procedure Laterality Date     SECTION      COLONOSCOPY  2020    no polyps    COLONOSCOPY N/A 10/24/2023    COLONOSCOPY performed by Ronaldo Alvarenga MD at [facility] ENDOSCOPY    EYE SURGERY Bilateral     cataract extraction    KNEE CARTILAGE SURGERY Right     age 11       Allergies:  Allergies   Allergen Reactions    Other Shortness Of Breath     CATS    Seasonal Shortness Of Breath     CATS    Erythromycin Nausea Only        Onset: 2024    Chief Complaint/Subjective: Covid Positive  Patient requesting prescription for Paxlovid     Current Symptoms:   SOB: No  Chest pain:  No  Racing heart rate or palpitations: No  Headache:  No   Visual changes:  No  Lightheaded: No  Slurred speech:  No  Nausea: No  Vomiting: No  Diarrhea: No    Covid Positive  Home Test: 2024 at 08:00  Symptoms Onset: 2024  Fatigue      Associated Symptoms:   Returned from cruise on 2024  Patient reported history of: Asthma & Pneumonia    Temperature: 101.3     Current Pain Severity: 0    Maximum Pain Severity: 0    Location:    Pain Quality:     Better/Worse:   NA    What has been tried:   Nothing    Recommended disposition: Schedule appointment for evaluation or Go to Urgent Care for evaluation    Future Appointments

## 2024-08-20 SDOH — ECONOMIC STABILITY: FOOD INSECURITY: WITHIN THE PAST 12 MONTHS, YOU WORRIED THAT YOUR FOOD WOULD RUN OUT BEFORE YOU GOT MONEY TO BUY MORE.: NEVER TRUE

## 2024-08-20 SDOH — ECONOMIC STABILITY: FOOD INSECURITY: WITHIN THE PAST 12 MONTHS, THE FOOD YOU BOUGHT JUST DIDN'T LAST AND YOU DIDN'T HAVE MONEY TO GET MORE.: NEVER TRUE

## 2024-08-20 SDOH — ECONOMIC STABILITY: INCOME INSECURITY: HOW HARD IS IT FOR YOU TO PAY FOR THE VERY BASICS LIKE FOOD, HOUSING, MEDICAL CARE, AND HEATING?: NOT HARD AT ALL

## 2024-08-23 ENCOUNTER — OFFICE VISIT (OUTPATIENT)
Age: 69
End: 2024-08-23
Payer: MEDICARE

## 2024-08-23 VITALS
WEIGHT: 166.2 LBS | HEIGHT: 64 IN | HEART RATE: 81 BPM | OXYGEN SATURATION: 94 % | SYSTOLIC BLOOD PRESSURE: 112 MMHG | DIASTOLIC BLOOD PRESSURE: 64 MMHG | TEMPERATURE: 98.2 F | RESPIRATION RATE: 18 BRPM | BODY MASS INDEX: 28.38 KG/M2

## 2024-08-23 DIAGNOSIS — I10 ESSENTIAL (PRIMARY) HYPERTENSION: ICD-10-CM

## 2024-08-23 DIAGNOSIS — Z00.00 MEDICARE ANNUAL WELLNESS VISIT, SUBSEQUENT: Primary | ICD-10-CM

## 2024-08-23 DIAGNOSIS — R73.03 PREDIABETES: ICD-10-CM

## 2024-08-23 DIAGNOSIS — F41.9 ANXIETY DISORDER, UNSPECIFIED TYPE: ICD-10-CM

## 2024-08-23 DIAGNOSIS — E78.5 HYPERLIPIDEMIA, UNSPECIFIED HYPERLIPIDEMIA TYPE: ICD-10-CM

## 2024-08-23 PROCEDURE — 3017F COLORECTAL CA SCREEN DOC REV: CPT | Performed by: FAMILY MEDICINE

## 2024-08-23 PROCEDURE — G0439 PPPS, SUBSEQ VISIT: HCPCS | Performed by: FAMILY MEDICINE

## 2024-08-23 PROCEDURE — 3078F DIAST BP <80 MM HG: CPT | Performed by: FAMILY MEDICINE

## 2024-08-23 PROCEDURE — 1123F ACP DISCUSS/DSCN MKR DOCD: CPT | Performed by: FAMILY MEDICINE

## 2024-08-23 PROCEDURE — 3074F SYST BP LT 130 MM HG: CPT | Performed by: FAMILY MEDICINE

## 2024-08-23 SDOH — ECONOMIC STABILITY: FOOD INSECURITY: WITHIN THE PAST 12 MONTHS, YOU WORRIED THAT YOUR FOOD WOULD RUN OUT BEFORE YOU GOT MONEY TO BUY MORE.: NEVER TRUE

## 2024-08-23 SDOH — ECONOMIC STABILITY: FOOD INSECURITY: WITHIN THE PAST 12 MONTHS, THE FOOD YOU BOUGHT JUST DIDN'T LAST AND YOU DIDN'T HAVE MONEY TO GET MORE.: NEVER TRUE

## 2024-08-23 SDOH — ECONOMIC STABILITY: INCOME INSECURITY: HOW HARD IS IT FOR YOU TO PAY FOR THE VERY BASICS LIKE FOOD, HOUSING, MEDICAL CARE, AND HEATING?: NOT VERY HARD

## 2024-08-23 ASSESSMENT — PATIENT HEALTH QUESTIONNAIRE - PHQ9
SUM OF ALL RESPONSES TO PHQ QUESTIONS 1-9: 0
SUM OF ALL RESPONSES TO PHQ QUESTIONS 1-9: 0
2. FEELING DOWN, DEPRESSED OR HOPELESS: NOT AT ALL
SUM OF ALL RESPONSES TO PHQ9 QUESTIONS 1 & 2: 0
1. LITTLE INTEREST OR PLEASURE IN DOING THINGS: NOT AT ALL
SUM OF ALL RESPONSES TO PHQ QUESTIONS 1-9: 0
1. LITTLE INTEREST OR PLEASURE IN DOING THINGS: NOT AT ALL
SUM OF ALL RESPONSES TO PHQ QUESTIONS 1-9: 0

## 2024-08-23 ASSESSMENT — LIFESTYLE VARIABLES
HOW OFTEN DO YOU HAVE A DRINK CONTAINING ALCOHOL: MONTHLY OR LESS
HOW MANY STANDARD DRINKS CONTAINING ALCOHOL DO YOU HAVE ON A TYPICAL DAY: PATIENT DOES NOT DRINK

## 2024-08-23 NOTE — PROGRESS NOTES
[facility]   [facility] Medicine      Chief Complaint:   Chief Complaint   Patient presents with    Follow-up     No concerns today.       SUBJECTIVE:  Ingrid Sanchez is a 69 y.o. female who presents for MAW.  Questionnaire reviewed per nursing note.    HTN: Currently taking Amlodipine 5mg daily and HCTZ 25mg daily.  Denies side effects of the medication.  No CP/SOB.    Anxiety: Currently taking Effexor 37.5mg daily.  Reports sx are controlled.  Denies side effects of the medications.      PMHx:  Past Medical History:   Diagnosis Date    Asthma     currently no inhalers    Hypertension     Pneumonia 09/21/2017       Meds:   Current Outpatient Medications   Medication Sig Dispense Refill    amLODIPine (NORVASC) 5 MG tablet TAKE ONE TABLET BY MOUTH ONE TIME DAILY 90 tablet 1    hydroCHLOROthiazide (HYDRODIURIL) 25 MG tablet TAKE ONE TABLET BY MOUTH ONE TIME DAILY 90 tablet 1    venlafaxine (EFFEXOR XR) 37.5 MG extended release capsule TAKE ONE CAPSULE BY MOUTH ONE TIME DAILY 90 capsule 1    potassium chloride (KLOR-CON M) 10 MEQ extended release tablet TAKE ONE TABLET BY MOUTH TWICE A  tablet 1    Multiple Vitamin (MULTIVITAMIN ADULT PO) Take by mouth daily      fexofenadine (ALLEGRA HIVES 24HR) 180 MG tablet Take 1 tablet by mouth as needed       No current facility-administered medications for this visit.       Allergies:   Allergies   Allergen Reactions    Other Shortness Of Breath     CATS    Seasonal Shortness Of Breath     CATS    Erythromycin Nausea Only       Smoker:  Social History     Tobacco Use   Smoking Status Never    Passive exposure: Never   Smokeless Tobacco Never       ETOH:   Social History     Substance and Sexual Activity   Alcohol Use Yes    Comment: seldom       FH:   Family History   Problem Relation Age of Onset    Breast Cancer Mother 64    Cancer Mother         breast cancer passed at 64    Depression Mother

## 2024-08-23 NOTE — PROGRESS NOTES
Identified pt with two pt identifiers(name and ). Reviewed record in preparation for visit and have obtained necessary documentation.  Chief Complaint   Patient presents with    Follow-up     No concerns today.        Vitals:    24 1508   BP: 112/64   Site: Right Upper Arm   Position: Sitting   Cuff Size: Large Adult   Pulse: 81   Resp: 18   Temp: 98.2 °F (36.8 °C)   TempSrc: Oral   SpO2: 94%   Weight: 75.4 kg (166 lb 3.2 oz)   Height: 1.626 m (5' 4\")         Coordination of Care Questionnaire:  :     \"Have you been to the ER, urgent care clinic since your last visit?  Hospitalized since your last visit?\"    NO    “Have you seen or consulted any other health care providers outside of [facility] since your last visit?”    NO            Click Here for Release of Records Request

## 2024-08-24 LAB
ALBUMIN SERPL-MCNC: 4.1 G/DL (ref 3.5–5)
ALBUMIN/GLOB SERPL: 1.2 (ref 1.1–2.2)
ALP SERPL-CCNC: 92 U/L (ref 45–117)
ALT SERPL-CCNC: 27 U/L (ref 12–78)
ANION GAP SERPL CALC-SCNC: 5 MMOL/L (ref 5–15)
AST SERPL-CCNC: 19 U/L (ref 15–37)
BILIRUB SERPL-MCNC: 0.5 MG/DL (ref 0.2–1)
BUN SERPL-MCNC: 16 MG/DL (ref 6–20)
BUN/CREAT SERPL: 16 (ref 12–20)
CALCIUM SERPL-MCNC: 9.7 MG/DL (ref 8.5–10.1)
CHLORIDE SERPL-SCNC: 101 MMOL/L (ref 97–108)
CHOLEST SERPL-MCNC: 230 MG/DL
CO2 SERPL-SCNC: 36 MMOL/L (ref 21–32)
CREAT SERPL-MCNC: 0.99 MG/DL (ref 0.55–1.02)
ERYTHROCYTE [DISTWIDTH] IN BLOOD BY AUTOMATED COUNT: 13.2 % (ref 11.5–14.5)
EST. AVERAGE GLUCOSE BLD GHB EST-MCNC: 120 MG/DL
GLOBULIN SER CALC-MCNC: 3.3 G/DL (ref 2–4)
GLUCOSE SERPL-MCNC: 143 MG/DL (ref 65–100)
HBA1C MFR BLD: 5.8 % (ref 4–5.6)
HCT VFR BLD AUTO: 43.9 % (ref 35–47)
HDLC SERPL-MCNC: 93 MG/DL
HDLC SERPL: 2.5 (ref 0–5)
HGB BLD-MCNC: 14.5 G/DL (ref 11.5–16)
LDLC SERPL CALC-MCNC: 112 MG/DL (ref 0–100)
MCH RBC QN AUTO: 32.1 PG (ref 26–34)
MCHC RBC AUTO-ENTMCNC: 33 G/DL (ref 30–36.5)
MCV RBC AUTO: 97.1 FL (ref 80–99)
NRBC # BLD: 0 K/UL (ref 0–0.01)
NRBC BLD-RTO: 0 PER 100 WBC
PLATELET # BLD AUTO: 347 K/UL (ref 150–400)
PMV BLD AUTO: 10.6 FL (ref 8.9–12.9)
POTASSIUM SERPL-SCNC: 3.5 MMOL/L (ref 3.5–5.1)
PROT SERPL-MCNC: 7.4 G/DL (ref 6.4–8.2)
RBC # BLD AUTO: 4.52 M/UL (ref 3.8–5.2)
SODIUM SERPL-SCNC: 142 MMOL/L (ref 136–145)
TRIGL SERPL-MCNC: 125 MG/DL
TSH SERPL DL<=0.05 MIU/L-ACNC: 1.1 UIU/ML (ref 0.36–3.74)
VLDLC SERPL CALC-MCNC: 25 MG/DL
WBC # BLD AUTO: 12.2 K/UL (ref 3.6–11)

## 2024-10-22 ENCOUNTER — PATIENT MESSAGE (OUTPATIENT)
Age: 69
End: 2024-10-22

## 2024-10-24 NOTE — TELEPHONE ENCOUNTER
Good afternoon Ms. Marshallness,    Per Dr. Chavarria, because you got got the first two pneumococcal vaccine prior to the age of 65 you will need to get one more, any pneumococcal vaccine available will work.    If you have any further questions, please feel free to contact our office.      Have a good day.  Jaycee

## 2025-01-17 ENCOUNTER — OFFICE VISIT (OUTPATIENT)
Age: 70
End: 2025-01-17
Payer: MEDICARE

## 2025-01-17 ENCOUNTER — TELEPHONE (OUTPATIENT)
Age: 70
End: 2025-01-17

## 2025-01-17 VITALS
DIASTOLIC BLOOD PRESSURE: 69 MMHG | OXYGEN SATURATION: 97 % | TEMPERATURE: 97.8 F | BODY MASS INDEX: 28.79 KG/M2 | HEART RATE: 78 BPM | HEIGHT: 64 IN | RESPIRATION RATE: 20 BRPM | SYSTOLIC BLOOD PRESSURE: 120 MMHG | WEIGHT: 168.6 LBS

## 2025-01-17 DIAGNOSIS — R68.89 SUSPECTED MALIGNANT NEOPLASM OF OVARY: ICD-10-CM

## 2025-01-17 DIAGNOSIS — C56.1 MALIGNANT NEOPLASM OF RIGHT OVARY (HCC): ICD-10-CM

## 2025-01-17 DIAGNOSIS — N83.8 OVARIAN MASS, RIGHT: Primary | ICD-10-CM

## 2025-01-17 DIAGNOSIS — D75.1 ERYTHROCYTOSIS: ICD-10-CM

## 2025-01-17 DIAGNOSIS — G89.3 NEOPLASM RELATED PAIN (ACUTE) (CHRONIC): ICD-10-CM

## 2025-01-17 LAB — HEMOGLOBIN, POC: 13.5 G/DL

## 2025-01-17 PROCEDURE — 85018 HEMOGLOBIN: CPT

## 2025-01-17 PROCEDURE — 99213 OFFICE O/P EST LOW 20 MIN: CPT

## 2025-01-17 SDOH — ECONOMIC STABILITY: FOOD INSECURITY: WITHIN THE PAST 12 MONTHS, YOU WORRIED THAT YOUR FOOD WOULD RUN OUT BEFORE YOU GOT MONEY TO BUY MORE.: NEVER TRUE

## 2025-01-17 SDOH — ECONOMIC STABILITY: FOOD INSECURITY: WITHIN THE PAST 12 MONTHS, THE FOOD YOU BOUGHT JUST DIDN'T LAST AND YOU DIDN'T HAVE MONEY TO GET MORE.: NEVER TRUE

## 2025-01-17 ASSESSMENT — PATIENT HEALTH QUESTIONNAIRE - PHQ9
SUM OF ALL RESPONSES TO PHQ QUESTIONS 1-9: 0
2. FEELING DOWN, DEPRESSED OR HOPELESS: NOT AT ALL
SUM OF ALL RESPONSES TO PHQ9 QUESTIONS 1 & 2: 0
1. LITTLE INTEREST OR PLEASURE IN DOING THINGS: NOT AT ALL

## 2025-01-17 NOTE — TELEPHONE ENCOUNTER
Pt called to inform you that she was seen in hospital last night and was informed that she need a Bx of her ovaries and requested that you write an order for the Bx. Pt was encouraged to schedule an appt to obtain order. She has agreed to come in a 1:00 with Dr. Morales.     Pt asked to inform you that she is uploading her discharge paperwork and asked that you view.

## 2025-01-17 NOTE — PROGRESS NOTES
Identified pt with two pt identifiers(name and ). Reviewed record in preparation for visit and have obtained necessary documentation.  Chief Complaint   Patient presents with    Follow-up   [facility]      Health Maintenance Due   Topic    Pneumococcal 65+ years Vaccine (3 of 3 - PPSV23 or PCV20)    Flu vaccine (1)    COVID-19 Vaccine ( season)    Breast cancer screen        Vitals:    25 1301   Weight: 76.5 kg (168 lb 9.6 oz)   Height: 1.626 m (5' 4\")         \"Have you been to the ER, urgent care clinic since your last visit?  Hospitalized since your last visit?\"    Yes, 25 [facility]     “Have you seen or consulted any other health care providers outside of [facility] since your last visit?”    NO    Have you had a mammogram?”   NO    Date of last Mammogram: 2023             Click Here for Release of Records Request     This patient is accompanied in the office by her spouse.  I have received verbal consent from Ingrid Sanchez to discuss any/all medical information while they are present in the room.

## 2025-01-17 NOTE — PROGRESS NOTES
[address] [place] [address]   Office [phone], Fax [phone]    Subjective   Ingrid Sanchez is a 69 y.o. female who presents for Follow-up    #ER f/u  - Seen for abdominal pain  - Chart review: Hgb 17.2 (POC checked today 13.5)  - CT AP: Asymmetric enlargement of the right ovary measuring 4.2 x 3.5 x 4.1 cm. Cysts noted in the adnexa bilaterally measuring up to 3.8 cm on the left side and 1.5 cm on the right.   - TVUS: 3.8cm solid R adnexal mass, c/f neoplasm. ORADS4. MRI pelvis wwo recommended.  - L sided ovarian cyst is chronic and stable  Weight stable in past year: 164 > 168lb  No fever/chills, night sweats  No vaginal bleeding/discharge, urinary sx, numbness/weakness/confusion, CP, SOB  No change in bowel movement changes    Medical History  Past Medical History:   Diagnosis Date    Asthma     currently no inhalers    Hypertension     Pneumonia 09/21/2017       Medications  Current Outpatient Medications   Medication Sig    amLODIPine (NORVASC) 5 MG tablet TAKE ONE TABLET BY MOUTH ONE TIME DAILY    hydroCHLOROthiazide (HYDRODIURIL) 25 MG tablet TAKE ONE TABLET BY MOUTH ONE TIME DAILY    venlafaxine (EFFEXOR XR) 37.5 MG extended release capsule TAKE ONE CAPSULE BY MOUTH ONE TIME DAILY    potassium chloride (KLOR-CON M) 10 MEQ extended release tablet TAKE ONE TABLET BY MOUTH TWICE A DAY    Multiple Vitamin (MULTIVITAMIN ADULT PO) Take by mouth daily    fexofenadine (ALLEGRA HIVES 24HR) 180 MG tablet Take 1 tablet by mouth as needed     No current facility-administered medications for this visit.       Immunizations   Immunization History   Administered Date(s) Administered    COVID-19, MODERNA BLUE border, Primary or Immunocompromised, (age 12y+), IM, 100 mcg/0.5mL 04/20/2021, 05/18/2021, 11/30/2021    COVID-19, MODERNA Bivalent, (age 12y+), IM, 50 mcg/0.5 mL 07/03/2022    COVID-19, MODERNA, (age 12y+), IM, 50mcg/0.5mL 12/11/2023    Influenza Virus Vaccine 10/27/2014, 10/06/2015,

## 2025-01-17 NOTE — PATIENT INSTRUCTIONS
Yunior Campo MD  [facility]  [address], [address], [place] [address]

## 2025-01-18 LAB — CEA SERPL-MCNC: 1.2 NG/ML

## 2025-01-19 ENCOUNTER — PATIENT MESSAGE (OUTPATIENT)
Age: 70
End: 2025-01-19

## 2025-01-19 ENCOUNTER — HOSPITAL ENCOUNTER (OUTPATIENT)
Facility: HOSPITAL | Age: 70
Discharge: HOME | End: 2025-01-22
Payer: MEDICARE

## 2025-01-19 DIAGNOSIS — N83.8 OVARIAN MASS, RIGHT: ICD-10-CM

## 2025-01-19 LAB — MISCELLANEOUS LAB TEST RESULT: NORMAL

## 2025-01-19 PROCEDURE — 72197 MRI PELVIS W/O & W/DYE: CPT

## 2025-01-19 PROCEDURE — A9579 GAD-BASE MR CONTRAST NOS,1ML: HCPCS | Performed by: RADIOLOGY

## 2025-01-19 PROCEDURE — 6360000004 HC RX CONTRAST MEDICATION: Performed by: RADIOLOGY

## 2025-01-19 RX ADMIN — GADOTERIDOL 15 ML: 279.3 INJECTION, SOLUTION INTRAVENOUS at 14:43

## 2025-01-20 LAB — CANCER AG19-9 SERPL-ACNC: 91 U/ML (ref 0–35)

## 2025-01-21 ENCOUNTER — TELEPHONE (OUTPATIENT)
Age: 70
End: 2025-01-21

## 2025-01-21 NOTE — TELEPHONE ENCOUNTER
Telephone Encounter:     Called patient ~6:53 PM. Confirmed name and .  Reviewed results of tumor marker labs and MRI which recommended OBGYN evaluation. Pt already has appointment with gyn onc on . Also reviewed findings of CTAP from  which showed normal pancreas. Answered all questions.    Sarah Morales MD  25

## 2025-01-28 ENCOUNTER — HOSPITAL ENCOUNTER (OUTPATIENT)
Facility: HOSPITAL | Age: 70
Discharge: HOME | End: 2025-01-31
Payer: MEDICARE

## 2025-01-28 DIAGNOSIS — Z12.31 VISIT FOR SCREENING MAMMOGRAM: ICD-10-CM

## 2025-01-28 PROCEDURE — 77063 BREAST TOMOSYNTHESIS BI: CPT

## 2025-01-28 NOTE — PROGRESS NOTES
New Patient, Referred by Dr. Morales, right ovarian mass    1. Have you been to the ER, urgent care clinic since your last visit?  Hospitalized since your last visit?  Yes to ER on 1/16/25    2. Have you seen or consulted any other health care providers outside of the [facility] since your last visit?  Include any pap smears or colon screening.   no

## 2025-01-29 ENCOUNTER — OFFICE VISIT (OUTPATIENT)
Age: 70
End: 2025-01-29
Payer: MEDICARE

## 2025-01-29 VITALS
HEART RATE: 84 BPM | BODY MASS INDEX: 28.58 KG/M2 | SYSTOLIC BLOOD PRESSURE: 157 MMHG | HEIGHT: 64 IN | WEIGHT: 167.4 LBS | DIASTOLIC BLOOD PRESSURE: 85 MMHG

## 2025-01-29 DIAGNOSIS — R19.00 PELVIC MASS: ICD-10-CM

## 2025-01-29 DIAGNOSIS — Z12.4 SCREENING FOR CERVICAL CANCER: ICD-10-CM

## 2025-01-29 DIAGNOSIS — N83.202 CYST OF LEFT OVARY: Primary | ICD-10-CM

## 2025-01-29 PROCEDURE — 1126F AMNT PAIN NOTED NONE PRSNT: CPT | Performed by: OBSTETRICS & GYNECOLOGY

## 2025-01-29 PROCEDURE — G8427 DOCREV CUR MEDS BY ELIG CLIN: HCPCS | Performed by: OBSTETRICS & GYNECOLOGY

## 2025-01-29 PROCEDURE — 99205 OFFICE O/P NEW HI 60 MIN: CPT | Performed by: OBSTETRICS & GYNECOLOGY

## 2025-01-29 PROCEDURE — 1090F PRES/ABSN URINE INCON ASSESS: CPT | Performed by: OBSTETRICS & GYNECOLOGY

## 2025-01-29 PROCEDURE — 1160F RVW MEDS BY RX/DR IN RCRD: CPT | Performed by: OBSTETRICS & GYNECOLOGY

## 2025-01-29 PROCEDURE — 1123F ACP DISCUSS/DSCN MKR DOCD: CPT | Performed by: OBSTETRICS & GYNECOLOGY

## 2025-01-29 PROCEDURE — 3077F SYST BP >= 140 MM HG: CPT | Performed by: OBSTETRICS & GYNECOLOGY

## 2025-01-29 PROCEDURE — 1159F MED LIST DOCD IN RCRD: CPT | Performed by: OBSTETRICS & GYNECOLOGY

## 2025-01-29 PROCEDURE — G8419 CALC BMI OUT NRM PARAM NOF/U: HCPCS | Performed by: OBSTETRICS & GYNECOLOGY

## 2025-01-29 PROCEDURE — 3017F COLORECTAL CA SCREEN DOC REV: CPT | Performed by: OBSTETRICS & GYNECOLOGY

## 2025-01-29 PROCEDURE — 1036F TOBACCO NON-USER: CPT | Performed by: OBSTETRICS & GYNECOLOGY

## 2025-01-29 PROCEDURE — 3079F DIAST BP 80-89 MM HG: CPT | Performed by: OBSTETRICS & GYNECOLOGY

## 2025-01-29 PROCEDURE — G8399 PT W/DXA RESULTS DOCUMENT: HCPCS | Performed by: OBSTETRICS & GYNECOLOGY

## 2025-01-29 ASSESSMENT — PATIENT HEALTH QUESTIONNAIRE - PHQ9
SUM OF ALL RESPONSES TO PHQ9 QUESTIONS 1 & 2: 0
SUM OF ALL RESPONSES TO PHQ QUESTIONS 1-9: 0
SUM OF ALL RESPONSES TO PHQ QUESTIONS 1-9: 0
2. FEELING DOWN, DEPRESSED OR HOPELESS: NOT AT ALL
SUM OF ALL RESPONSES TO PHQ QUESTIONS 1-9: 0
1. LITTLE INTEREST OR PLEASURE IN DOING THINGS: NOT AT ALL
SUM OF ALL RESPONSES TO PHQ QUESTIONS 1-9: 0

## 2025-01-29 NOTE — PROGRESS NOTES
[facility] GYNECOLOGIC ONCOLOGY  [address] [place] [address], [address]  [place] [address]  P [phone]  F [phone]    Office Note  Patient ID:  Name:  Ingrid Sanchez  MRN:  [id number]  :  1955/69 y.o.  Date:  2025      HISTORY OF PRESENT ILLNESS:  Ms. Ingrid Sanchez is a 69 y.o. female who presents as a new patient from Dr. Morales for ovarian mass.     Patient was seen in the ER and [place], [place] at [facility].  The patient reports that she started to have lower right abdominal pain.  CT A/P on 2025 had the below results, which included a \"    Imaging Review:   CT A/P on 2025:  Impression:  1.    There is focal inflammatory stranding of the mesenteric fat in the anterior right mid abdomen. This is inferior to the proximal transverse colon. There is mild wall thickening of the transverse colon in this region, however the bowel is decompressed. The findings most likely represent epiploic appendicitis. Differential diagnosis would include mild acute diverticulitis since there is underlying mild scattered diverticulosis, however this is less likely giving the appearance and there is no evidence of extraluminal gas or abscess.    2.    There are numerous hepatic cysts noted measuring up to 5.4 cm. There are also bilateral renal cortical cysts noted measuring up to 2 cm on the right and 1 cm on the left. These appear benign and require no further follow-up. Nonobstructing left renal calculi are noted measuring up to 4 mm.   3.    There is asymmetric enlargement of the right ovary measuring 4.2 x 3.5 x 4.1 cm. There are cysts noted in the adnexa bilaterally measuring up to 3.8 cm on the left side and 1.5 cm on the right. These could be further evaluated with pelvic ultrasound if clinically indicated.     ROS:  A comprehensive review of systems was negative except for that written in the History of Present Illness. , 10 point ROS      ECOG stGstrstastdstest:st st1st Problem List:  Patient

## 2025-01-29 NOTE — H&P (VIEW-ONLY)
Date as available:    Lab Results   Component Value Date/Time    WBC 12.2 08/23/2024 04:00 PM    HGB 14.5 08/23/2024 04:00 PM    HCT 43.9 08/23/2024 04:00 PM     08/23/2024 04:00 PM    MCV 97.1 08/23/2024 04:00 PM     Lab Results   Component Value Date/Time     08/23/2024 04:00 PM    K 3.5 08/23/2024 04:00 PM     08/23/2024 04:00 PM    CO2 36 08/23/2024 04:00 PM    BUN 16 08/23/2024 04:00 PM    GFRAA >60 09/29/2022 11:15 AM         IMPRESSION/PLAN:    Ms. Ingrid Sanchez is a 69 y.o. female who presents with \"asymmetric enlargement of the right ovary measuring 4.2 x 3.5 x 4.1 cm. There are cysts noted in the adnexa bilaterally measuring up to 3.8 cm on the left side and 1.5 cm on the right.\"    Problems:     Patient Active Problem List    Diagnosis Date Noted    CAP (community acquired pneumonia) 09/22/2017    Pneumonia 09/21/2017    Osteopenia of spine 10/03/2016    Cyst of left ovary 10/03/2016    Bilateral renal cysts 10/03/2016    Hepatic cyst 10/03/2016    Prediabetes 10/03/2016    Preventative health care 10/03/2016    Nephrolithiasis 10/03/2016    Overweight (BMI 25.0-29.9) 09/13/2016    Hypokalemia 09/13/2016    Reflux pharyngitis 09/13/2016    Family history of colon cancer 09/13/2016    Essential hypertension with goal blood pressure less than 140/90 09/13/2016    Chronic midline low back pain without sciatica 09/13/2016    History of basal cell carcinoma 09/13/2016    SIMA (generalized anxiety disorder) 09/13/2016       I reviewed Ms. Ingrid Sanchez's course to date, including her medical records, recent studies, physical exam, and review of symptoms. Counseled patient regarding benign, borderline, and malignant conditions. Given the complexity and size of the mass, I have recommended surgical removal. Reviewed the risks, benefits, indications, and alternatives to surgery. Plan for diagnostic laparoscopy, resection of mass, hysterectomy, bilateral salpingo-oophorectomy, possible

## 2025-01-30 ENCOUNTER — HOSPITAL ENCOUNTER (OUTPATIENT)
Facility: HOSPITAL | Age: 70
Setting detail: SPECIMEN
Discharge: HOME | End: 2025-01-30
Payer: MEDICARE

## 2025-01-30 PROCEDURE — 87624 HPV HI-RISK TYP POOLED RSLT: CPT

## 2025-01-30 PROCEDURE — 88175 CYTOPATH C/V AUTO FLUID REDO: CPT

## 2025-02-03 ENCOUNTER — HOSPITAL ENCOUNTER (OUTPATIENT)
Facility: HOSPITAL | Age: 70
Discharge: HOME | End: 2025-02-06
Payer: MEDICARE

## 2025-02-03 VITALS
BODY MASS INDEX: 28.27 KG/M2 | HEART RATE: 76 BPM | SYSTOLIC BLOOD PRESSURE: 145 MMHG | HEIGHT: 64 IN | TEMPERATURE: 97.4 F | RESPIRATION RATE: 18 BRPM | WEIGHT: 165.57 LBS | DIASTOLIC BLOOD PRESSURE: 73 MMHG

## 2025-02-03 LAB
ALBUMIN SERPL-MCNC: 3.9 G/DL (ref 3.5–5)
ALBUMIN/GLOB SERPL: 1.1 (ref 1.1–2.2)
ALP SERPL-CCNC: 95 U/L (ref 45–117)
ALT SERPL-CCNC: 26 U/L (ref 12–78)
ANION GAP SERPL CALC-SCNC: 8 MMOL/L (ref 2–12)
AST SERPL-CCNC: 18 U/L (ref 15–37)
BASOPHILS # BLD: 0.07 K/UL (ref 0–0.1)
BASOPHILS NFR BLD: 0.6 % (ref 0–1)
BILIRUB SERPL-MCNC: 0.6 MG/DL (ref 0.2–1)
BUN SERPL-MCNC: 18 MG/DL (ref 6–20)
BUN/CREAT SERPL: 21 (ref 12–20)
CALCIUM SERPL-MCNC: 10.3 MG/DL (ref 8.5–10.1)
CHLORIDE SERPL-SCNC: 97 MMOL/L (ref 97–108)
CO2 SERPL-SCNC: 33 MMOL/L (ref 21–32)
CREAT SERPL-MCNC: 0.85 MG/DL (ref 0.55–1.02)
DIFFERENTIAL METHOD BLD: ABNORMAL
EOSINOPHIL # BLD: 0.2 K/UL (ref 0–0.4)
EOSINOPHIL NFR BLD: 1.8 % (ref 0–7)
ERYTHROCYTE [DISTWIDTH] IN BLOOD BY AUTOMATED COUNT: 12.9 % (ref 11.5–14.5)
GLOBULIN SER CALC-MCNC: 3.6 G/DL (ref 2–4)
GLUCOSE SERPL-MCNC: 137 MG/DL (ref 65–100)
HCT VFR BLD AUTO: 46.7 % (ref 35–47)
HGB BLD-MCNC: 15.9 G/DL (ref 11.5–16)
HPV I/H RISK 1 DNA CVX QL PROBE+SIG AMP: NEGATIVE
IMM GRANULOCYTES # BLD AUTO: 0.04 K/UL (ref 0–0.04)
IMM GRANULOCYTES NFR BLD AUTO: 0.4 % (ref 0–0.5)
LYMPHOCYTES # BLD: 2.31 K/UL (ref 0.8–3.5)
LYMPHOCYTES NFR BLD: 20.7 % (ref 12–49)
MCH RBC QN AUTO: 31.7 PG (ref 26–34)
MCHC RBC AUTO-ENTMCNC: 34 G/DL (ref 30–36.5)
MCV RBC AUTO: 93.2 FL (ref 80–99)
MONOCYTES # BLD: 1.04 K/UL (ref 0–1)
MONOCYTES NFR BLD: 9.3 % (ref 5–13)
NEUTS SEG # BLD: 7.52 K/UL (ref 1.8–8)
NEUTS SEG NFR BLD: 67.2 % (ref 32–75)
NRBC # BLD: 0 K/UL (ref 0–0.01)
NRBC BLD-RTO: 0 PER 100 WBC
PLATELET # BLD AUTO: 360 K/UL (ref 150–400)
PMV BLD AUTO: 10.4 FL (ref 8.9–12.9)
POTASSIUM SERPL-SCNC: 3.5 MMOL/L (ref 3.5–5.1)
PROT SERPL-MCNC: 7.5 G/DL (ref 6.4–8.2)
RBC # BLD AUTO: 5.01 M/UL (ref 3.8–5.2)
SODIUM SERPL-SCNC: 138 MMOL/L (ref 136–145)
WBC # BLD AUTO: 11.2 K/UL (ref 3.6–11)

## 2025-02-03 PROCEDURE — 93005 ELECTROCARDIOGRAM TRACING: CPT | Performed by: OBSTETRICS & GYNECOLOGY

## 2025-02-03 PROCEDURE — 71046 X-RAY EXAM CHEST 2 VIEWS: CPT

## 2025-02-03 PROCEDURE — 36415 COLL VENOUS BLD VENIPUNCTURE: CPT

## 2025-02-03 PROCEDURE — 80053 COMPREHEN METABOLIC PANEL: CPT

## 2025-02-03 PROCEDURE — 85025 COMPLETE CBC W/AUTO DIFF WBC: CPT

## 2025-02-03 NOTE — PERIOP NOTE
[address] [address]   MAIN OR                                  [phone]    MAIN PRE OP             [phone]                                                                                AMBULATORY PRE OP          [phone]  PRE-ADMISSION TESTING    [phone]     Surgery Date:  2/13/25       Is surgery arrival time given by surgeon?  YES  NO    If “NO”, [facility] staff will call you between 4 and 7pm the day before your surgery with your arrival time. (If your surgery is on a Monday, we will call you the Friday before.)    Call [phone] after 7pm Monday-Friday if you did not receive this call.    INSTRUCTIONS BEFORE YOUR SURGERY   When You  Arrive Arrive at [facility] Patient Access on 1st floor the day of your surgery.  Have your insurance card, photo ID,living will/advanced directive/POA (if applicable),  and any copayment (if needed)   Food   and   Drink NO solid food after midnight the night before surgery. You can drink clear liquids from midnight until ONE hour prior to your arrival at the hospital on the day of your surgery. Clear liquids include:  Water  Apple juice (no sediment)  Carbonated beverages  Black coffee(no cream/milk)  Tea(no cream/milk)  Gatorade    No alcohol (beer, wine, liquor) or marijuana (smoking) 24 hours, edibles (3 days). Stop smoking cigarettes 14 days before surgery (helps w/healing and breathing).   Medications to   TAKE   Morning of Surgery MEDICATIONS TO TAKE THE MORNING OF SURGERY WITH A SIP OF WATER: EFFEXOR, AMLODIPINE    You may take these medications, IF NEEDED, the morning of surgery: ALLEGRA    Ask your surgeon/prescribing doctor for instructions on taking or stopping these medications prior to surgery: NONE   Medications to STOP  before surgery Non-Steroidal anti-inflammatory Drugs (NSAID's): for example, Diclofenac (Voltaren), Ibuprofen (Advil, Motrin), Naproxen (Aleve) 3

## 2025-02-04 LAB
EKG ATRIAL RATE: 71 BPM
EKG DIAGNOSIS: NORMAL
EKG P AXIS: 45 DEGREES
EKG P-R INTERVAL: 176 MS
EKG Q-T INTERVAL: 440 MS
EKG QRS DURATION: 90 MS
EKG QTC CALCULATION (BAZETT): 478 MS
EKG R AXIS: 56 DEGREES
EKG T AXIS: 59 DEGREES
EKG VENTRICULAR RATE: 71 BPM

## 2025-02-04 PROCEDURE — 93010 ELECTROCARDIOGRAM REPORT: CPT | Performed by: SPECIALIST

## 2025-02-04 NOTE — PERIOP NOTE
NURSE CALLED [facility] RADIOLOGY AND ASKED THEM TO READ CHEST X-RAY. NURSE SPOKE TO MESERET.

## 2025-02-10 ENCOUNTER — ANESTHESIA (OUTPATIENT)
Facility: HOSPITAL | Age: 70
End: 2025-02-10
Payer: MEDICARE

## 2025-02-10 ENCOUNTER — ANCILLARY PROCEDURE (OUTPATIENT)
Facility: HOSPITAL | Age: 70
End: 2025-02-10
Attending: OBSTETRICS & GYNECOLOGY
Payer: MEDICARE

## 2025-02-10 ENCOUNTER — ANESTHESIA EVENT (OUTPATIENT)
Facility: HOSPITAL | Age: 70
End: 2025-02-10
Payer: MEDICARE

## 2025-02-10 ENCOUNTER — HOSPITAL ENCOUNTER (OUTPATIENT)
Facility: HOSPITAL | Age: 70
Discharge: HOME | End: 2025-02-10
Attending: OBSTETRICS & GYNECOLOGY | Admitting: OBSTETRICS & GYNECOLOGY
Payer: MEDICARE

## 2025-02-10 VITALS
SYSTOLIC BLOOD PRESSURE: 114 MMHG | WEIGHT: 165.57 LBS | TEMPERATURE: 98.1 F | HEIGHT: 64 IN | BODY MASS INDEX: 28.27 KG/M2 | DIASTOLIC BLOOD PRESSURE: 69 MMHG | RESPIRATION RATE: 16 BRPM | OXYGEN SATURATION: 98 % | HEART RATE: 79 BPM

## 2025-02-10 DIAGNOSIS — G89.18 POST-OP PAIN: ICD-10-CM

## 2025-02-10 DIAGNOSIS — Z01.818 PRE-OP TESTING: Primary | ICD-10-CM

## 2025-02-10 DIAGNOSIS — R19.00 PELVIC MASS: ICD-10-CM

## 2025-02-10 PROCEDURE — 94760 N-INVAS EAR/PLS OXIMETRY 1: CPT

## 2025-02-10 PROCEDURE — 6370000000 HC RX 637 (ALT 250 FOR IP): Performed by: PHYSICIAN ASSISTANT

## 2025-02-10 PROCEDURE — 88331 PATH CONSLTJ SURG 1 BLK 1SPC: CPT

## 2025-02-10 PROCEDURE — 2700000000 HC OXYGEN THERAPY PER DAY

## 2025-02-10 PROCEDURE — 7100000000 HC PACU RECOVERY - FIRST 15 MIN: Performed by: OBSTETRICS & GYNECOLOGY

## 2025-02-10 PROCEDURE — 7100000001 HC PACU RECOVERY - ADDTL 15 MIN: Performed by: OBSTETRICS & GYNECOLOGY

## 2025-02-10 PROCEDURE — 88332 PATH CONSLTJ SURG EA ADD BLK: CPT

## 2025-02-10 PROCEDURE — 3600000004 HC SURGERY LEVEL 4 BASE: Performed by: OBSTETRICS & GYNECOLOGY

## 2025-02-10 PROCEDURE — 6360000002 HC RX W HCPCS: Performed by: PHYSICIAN ASSISTANT

## 2025-02-10 PROCEDURE — 6360000002 HC RX W HCPCS: Performed by: OBSTETRICS & GYNECOLOGY

## 2025-02-10 PROCEDURE — 3600000014 HC SURGERY LEVEL 4 ADDTL 15MIN: Performed by: OBSTETRICS & GYNECOLOGY

## 2025-02-10 PROCEDURE — P9045 ALBUMIN (HUMAN), 5%, 250 ML: HCPCS | Performed by: ANESTHESIOLOGY

## 2025-02-10 PROCEDURE — 2580000003 HC RX 258: Performed by: PHYSICIAN ASSISTANT

## 2025-02-10 PROCEDURE — 2500000003 HC RX 250 WO HCPCS: Performed by: PHYSICIAN ASSISTANT

## 2025-02-10 PROCEDURE — 2500000003 HC RX 250 WO HCPCS: Performed by: ANESTHESIOLOGY

## 2025-02-10 PROCEDURE — 3700000000 HC ANESTHESIA ATTENDED CARE: Performed by: OBSTETRICS & GYNECOLOGY

## 2025-02-10 PROCEDURE — 88307 TISSUE EXAM BY PATHOLOGIST: CPT

## 2025-02-10 PROCEDURE — 6360000002 HC RX W HCPCS: Performed by: ANESTHESIOLOGY

## 2025-02-10 PROCEDURE — 2709999900 HC NON-CHARGEABLE SUPPLY: Performed by: OBSTETRICS & GYNECOLOGY

## 2025-02-10 PROCEDURE — 3700000001 HC ADD 15 MINUTES (ANESTHESIA): Performed by: OBSTETRICS & GYNECOLOGY

## 2025-02-10 PROCEDURE — 2720000010 HC SURG SUPPLY STERILE: Performed by: OBSTETRICS & GYNECOLOGY

## 2025-02-10 PROCEDURE — 2580000003 HC RX 258: Performed by: ANESTHESIOLOGY

## 2025-02-10 RX ORDER — AMLODIPINE BESYLATE 5 MG/1
5 TABLET ORAL DAILY
Status: DISCONTINUED | OUTPATIENT
Start: 2025-02-11 | End: 2025-02-10 | Stop reason: HOSPADM

## 2025-02-10 RX ORDER — SODIUM CHLORIDE 0.9 % (FLUSH) 0.9 %
5-40 SYRINGE (ML) INJECTION PRN
Status: DISCONTINUED | OUTPATIENT
Start: 2025-02-10 | End: 2025-02-10 | Stop reason: HOSPADM

## 2025-02-10 RX ORDER — ONDANSETRON 2 MG/ML
INJECTION INTRAMUSCULAR; INTRAVENOUS
Status: DISCONTINUED | OUTPATIENT
Start: 2025-02-10 | End: 2025-02-10 | Stop reason: SDUPTHER

## 2025-02-10 RX ORDER — ONDANSETRON 2 MG/ML
4 INJECTION INTRAMUSCULAR; INTRAVENOUS
Status: DISCONTINUED | OUTPATIENT
Start: 2025-02-10 | End: 2025-02-10 | Stop reason: HOSPADM

## 2025-02-10 RX ORDER — PROPOFOL 10 MG/ML
INJECTION, EMULSION INTRAVENOUS
Status: DISCONTINUED | OUTPATIENT
Start: 2025-02-10 | End: 2025-02-10 | Stop reason: SDUPTHER

## 2025-02-10 RX ORDER — ACETAMINOPHEN 500 MG
1000 TABLET ORAL EVERY 8 HOURS SCHEDULED
Status: DISCONTINUED | OUTPATIENT
Start: 2025-02-10 | End: 2025-02-10 | Stop reason: HOSPADM

## 2025-02-10 RX ORDER — FENTANYL CITRATE 50 UG/ML
25 INJECTION, SOLUTION INTRAMUSCULAR; INTRAVENOUS EVERY 5 MIN PRN
Status: DISCONTINUED | OUTPATIENT
Start: 2025-02-10 | End: 2025-02-10 | Stop reason: HOSPADM

## 2025-02-10 RX ORDER — SODIUM CHLORIDE 0.9 % (FLUSH) 0.9 %
5-40 SYRINGE (ML) INJECTION EVERY 12 HOURS SCHEDULED
Status: DISCONTINUED | OUTPATIENT
Start: 2025-02-10 | End: 2025-02-10 | Stop reason: HOSPADM

## 2025-02-10 RX ORDER — ACETAMINOPHEN 325 MG/1
650 TABLET ORAL ONCE
Status: DISCONTINUED | OUTPATIENT
Start: 2025-02-10 | End: 2025-02-10 | Stop reason: HOSPADM

## 2025-02-10 RX ORDER — PHENYLEPHRINE HYDROCHLORIDE 10 MG/ML
INJECTION INTRAVENOUS
Status: DISCONTINUED | OUTPATIENT
Start: 2025-02-10 | End: 2025-02-10 | Stop reason: SDUPTHER

## 2025-02-10 RX ORDER — ROCURONIUM BROMIDE 10 MG/ML
INJECTION, SOLUTION INTRAVENOUS
Status: DISCONTINUED | OUTPATIENT
Start: 2025-02-10 | End: 2025-02-10 | Stop reason: SDUPTHER

## 2025-02-10 RX ORDER — DOCUSATE SODIUM 100 MG/1
100 CAPSULE, LIQUID FILLED ORAL 2 TIMES DAILY
Status: DISCONTINUED | OUTPATIENT
Start: 2025-02-10 | End: 2025-02-10 | Stop reason: HOSPADM

## 2025-02-10 RX ORDER — KETOROLAC TROMETHAMINE 30 MG/ML
15 INJECTION, SOLUTION INTRAMUSCULAR; INTRAVENOUS EVERY 6 HOURS
Status: DISCONTINUED | OUTPATIENT
Start: 2025-02-10 | End: 2025-02-10 | Stop reason: HOSPADM

## 2025-02-10 RX ORDER — VENLAFAXINE HYDROCHLORIDE 37.5 MG/1
37.5 CAPSULE, EXTENDED RELEASE ORAL DAILY
Status: DISCONTINUED | OUTPATIENT
Start: 2025-02-11 | End: 2025-02-10 | Stop reason: HOSPADM

## 2025-02-10 RX ORDER — DOCUSATE SODIUM 100 MG/1
100 CAPSULE, LIQUID FILLED ORAL DAILY
Qty: 30 CAPSULE | Refills: 0 | Status: SHIPPED | OUTPATIENT
Start: 2025-02-10

## 2025-02-10 RX ORDER — ONDANSETRON 2 MG/ML
4 INJECTION INTRAMUSCULAR; INTRAVENOUS EVERY 6 HOURS PRN
Status: DISCONTINUED | OUTPATIENT
Start: 2025-02-10 | End: 2025-02-10 | Stop reason: HOSPADM

## 2025-02-10 RX ORDER — ACETAMINOPHEN 500 MG
1000 TABLET ORAL ONCE
Status: COMPLETED | OUTPATIENT
Start: 2025-02-10 | End: 2025-02-10

## 2025-02-10 RX ORDER — IBUPROFEN 400 MG/1
800 TABLET, FILM COATED ORAL EVERY 8 HOURS
Status: DISCONTINUED | OUTPATIENT
Start: 2025-02-11 | End: 2025-02-10 | Stop reason: HOSPADM

## 2025-02-10 RX ORDER — KETOROLAC TROMETHAMINE 30 MG/ML
INJECTION, SOLUTION INTRAMUSCULAR; INTRAVENOUS
Status: DISCONTINUED | OUTPATIENT
Start: 2025-02-10 | End: 2025-02-10 | Stop reason: SDUPTHER

## 2025-02-10 RX ORDER — MEPERIDINE HYDROCHLORIDE 25 MG/ML
12.5 INJECTION INTRAMUSCULAR; INTRAVENOUS; SUBCUTANEOUS EVERY 5 MIN PRN
Status: DISCONTINUED | OUTPATIENT
Start: 2025-02-10 | End: 2025-02-10 | Stop reason: HOSPADM

## 2025-02-10 RX ORDER — POTASSIUM CHLORIDE 750 MG/1
10 TABLET, EXTENDED RELEASE ORAL 2 TIMES DAILY
Status: DISCONTINUED | OUTPATIENT
Start: 2025-02-10 | End: 2025-02-10 | Stop reason: HOSPADM

## 2025-02-10 RX ORDER — SODIUM CHLORIDE 9 MG/ML
INJECTION, SOLUTION INTRAVENOUS PRN
Status: DISCONTINUED | OUTPATIENT
Start: 2025-02-10 | End: 2025-02-10 | Stop reason: HOSPADM

## 2025-02-10 RX ORDER — SODIUM CHLORIDE, SODIUM LACTATE, POTASSIUM CHLORIDE, CALCIUM CHLORIDE 600; 310; 30; 20 MG/100ML; MG/100ML; MG/100ML; MG/100ML
INJECTION, SOLUTION INTRAVENOUS CONTINUOUS
Status: DISCONTINUED | OUTPATIENT
Start: 2025-02-10 | End: 2025-02-10 | Stop reason: HOSPADM

## 2025-02-10 RX ORDER — TRAMADOL HYDROCHLORIDE 50 MG/1
50 TABLET ORAL EVERY 6 HOURS PRN
Status: DISCONTINUED | OUTPATIENT
Start: 2025-02-10 | End: 2025-02-10 | Stop reason: HOSPADM

## 2025-02-10 RX ORDER — ONDANSETRON 4 MG/1
4 TABLET, ORALLY DISINTEGRATING ORAL EVERY 8 HOURS PRN
Status: DISCONTINUED | OUTPATIENT
Start: 2025-02-10 | End: 2025-02-10 | Stop reason: HOSPADM

## 2025-02-10 RX ORDER — FENTANYL CITRATE 50 UG/ML
INJECTION, SOLUTION INTRAMUSCULAR; INTRAVENOUS
Status: DISCONTINUED | OUTPATIENT
Start: 2025-02-10 | End: 2025-02-10 | Stop reason: SDUPTHER

## 2025-02-10 RX ORDER — BUPIVACAINE HYDROCHLORIDE 5 MG/ML
INJECTION, SOLUTION EPIDURAL; INTRACAUDAL PRN
Status: DISCONTINUED | OUTPATIENT
Start: 2025-02-10 | End: 2025-02-10 | Stop reason: HOSPADM

## 2025-02-10 RX ORDER — TRAMADOL HYDROCHLORIDE 50 MG/1
50 TABLET ORAL EVERY 6 HOURS PRN
Qty: 20 TABLET | Refills: 0 | Status: SHIPPED | OUTPATIENT
Start: 2025-02-10 | End: 2025-02-15

## 2025-02-10 RX ORDER — ALBUMIN HUMAN 50 G/1000ML
SOLUTION INTRAVENOUS
Status: DISCONTINUED | OUTPATIENT
Start: 2025-02-10 | End: 2025-02-10 | Stop reason: SDUPTHER

## 2025-02-10 RX ORDER — NALOXONE HYDROCHLORIDE 0.4 MG/ML
INJECTION, SOLUTION INTRAMUSCULAR; INTRAVENOUS; SUBCUTANEOUS PRN
Status: DISCONTINUED | OUTPATIENT
Start: 2025-02-10 | End: 2025-02-10 | Stop reason: HOSPADM

## 2025-02-10 RX ORDER — LIDOCAINE HYDROCHLORIDE 20 MG/ML
INJECTION, SOLUTION EPIDURAL; INFILTRATION; INTRACAUDAL; PERINEURAL
Status: DISCONTINUED | OUTPATIENT
Start: 2025-02-10 | End: 2025-02-10 | Stop reason: SDUPTHER

## 2025-02-10 RX ORDER — HYDROMORPHONE HYDROCHLORIDE 1 MG/ML
0.5 INJECTION, SOLUTION INTRAMUSCULAR; INTRAVENOUS; SUBCUTANEOUS EVERY 5 MIN PRN
Status: DISCONTINUED | OUTPATIENT
Start: 2025-02-10 | End: 2025-02-10 | Stop reason: HOSPADM

## 2025-02-10 RX ORDER — PROCHLORPERAZINE EDISYLATE 5 MG/ML
5 INJECTION INTRAMUSCULAR; INTRAVENOUS
Status: DISCONTINUED | OUTPATIENT
Start: 2025-02-10 | End: 2025-02-10 | Stop reason: HOSPADM

## 2025-02-10 RX ORDER — SODIUM CHLORIDE 9 MG/ML
INJECTION, SOLUTION INTRAVENOUS CONTINUOUS
Status: DISCONTINUED | OUTPATIENT
Start: 2025-02-10 | End: 2025-02-10 | Stop reason: HOSPADM

## 2025-02-10 RX ORDER — FAMOTIDINE 20 MG/1
20 TABLET, FILM COATED ORAL 2 TIMES DAILY
Status: DISCONTINUED | OUTPATIENT
Start: 2025-02-10 | End: 2025-02-10 | Stop reason: HOSPADM

## 2025-02-10 RX ORDER — FENTANYL CITRATE 50 UG/ML
25 INJECTION, SOLUTION INTRAMUSCULAR; INTRAVENOUS PRN
Status: DISCONTINUED | OUTPATIENT
Start: 2025-02-10 | End: 2025-02-10 | Stop reason: HOSPADM

## 2025-02-10 RX ORDER — HYDROCHLOROTHIAZIDE 25 MG/1
25 TABLET ORAL DAILY
Status: DISCONTINUED | OUTPATIENT
Start: 2025-02-11 | End: 2025-02-10 | Stop reason: HOSPADM

## 2025-02-10 RX ORDER — MIDAZOLAM HYDROCHLORIDE 2 MG/2ML
2 INJECTION, SOLUTION INTRAMUSCULAR; INTRAVENOUS
Status: DISCONTINUED | OUTPATIENT
Start: 2025-02-10 | End: 2025-02-10 | Stop reason: HOSPADM

## 2025-02-10 RX ORDER — SUCCINYLCHOLINE/SOD CL,ISO/PF 200MG/10ML
SYRINGE (ML) INTRAVENOUS
Status: DISCONTINUED | OUTPATIENT
Start: 2025-02-10 | End: 2025-02-10 | Stop reason: SDUPTHER

## 2025-02-10 RX ORDER — PROCHLORPERAZINE EDISYLATE 5 MG/ML
10 INJECTION INTRAMUSCULAR; INTRAVENOUS EVERY 6 HOURS PRN
Status: DISCONTINUED | OUTPATIENT
Start: 2025-02-10 | End: 2025-02-10 | Stop reason: HOSPADM

## 2025-02-10 RX ORDER — FENTANYL CITRATE 50 UG/ML
50 INJECTION, SOLUTION INTRAMUSCULAR; INTRAVENOUS PRN
Status: DISCONTINUED | OUTPATIENT
Start: 2025-02-10 | End: 2025-02-10 | Stop reason: HOSPADM

## 2025-02-10 RX ORDER — MIDAZOLAM HYDROCHLORIDE 1 MG/ML
INJECTION, SOLUTION INTRAMUSCULAR; INTRAVENOUS
Status: DISCONTINUED | OUTPATIENT
Start: 2025-02-10 | End: 2025-02-10 | Stop reason: SDUPTHER

## 2025-02-10 RX ORDER — DEXAMETHASONE SODIUM PHOSPHATE 4 MG/ML
INJECTION, SOLUTION INTRA-ARTICULAR; INTRALESIONAL; INTRAMUSCULAR; INTRAVENOUS; SOFT TISSUE
Status: DISCONTINUED | OUTPATIENT
Start: 2025-02-10 | End: 2025-02-10 | Stop reason: SDUPTHER

## 2025-02-10 RX ORDER — MIDAZOLAM HYDROCHLORIDE 2 MG/2ML
2 INJECTION, SOLUTION INTRAMUSCULAR; INTRAVENOUS PRN
Status: DISCONTINUED | OUTPATIENT
Start: 2025-02-10 | End: 2025-02-10 | Stop reason: HOSPADM

## 2025-02-10 RX ORDER — OXYCODONE HYDROCHLORIDE 5 MG/1
5 TABLET ORAL
Status: DISCONTINUED | OUTPATIENT
Start: 2025-02-10 | End: 2025-02-10 | Stop reason: HOSPADM

## 2025-02-10 RX ORDER — METRONIDAZOLE 500 MG/100ML
500 INJECTION, SOLUTION INTRAVENOUS
Status: COMPLETED | OUTPATIENT
Start: 2025-02-10 | End: 2025-02-10

## 2025-02-10 RX ORDER — DIPHENHYDRAMINE HYDROCHLORIDE 50 MG/ML
12.5 INJECTION INTRAMUSCULAR; INTRAVENOUS
Status: DISCONTINUED | OUTPATIENT
Start: 2025-02-10 | End: 2025-02-10 | Stop reason: HOSPADM

## 2025-02-10 RX ORDER — LABETALOL HYDROCHLORIDE 5 MG/ML
10 INJECTION, SOLUTION INTRAVENOUS
Status: DISCONTINUED | OUTPATIENT
Start: 2025-02-10 | End: 2025-02-10 | Stop reason: HOSPADM

## 2025-02-10 RX ORDER — LIDOCAINE HYDROCHLORIDE 10 MG/ML
1 INJECTION, SOLUTION EPIDURAL; INFILTRATION; INTRACAUDAL; PERINEURAL
Status: DISCONTINUED | OUTPATIENT
Start: 2025-02-10 | End: 2025-02-10 | Stop reason: HOSPADM

## 2025-02-10 RX ADMIN — HYDROMORPHONE HYDROCHLORIDE 0.5 MG: 1 INJECTION, SOLUTION INTRAMUSCULAR; INTRAVENOUS; SUBCUTANEOUS at 09:33

## 2025-02-10 RX ADMIN — ROCURONIUM BROMIDE 45 MG: 10 INJECTION, SOLUTION INTRAVENOUS at 07:45

## 2025-02-10 RX ADMIN — FENTANYL CITRATE 50 MCG: 50 INJECTION INTRAMUSCULAR; INTRAVENOUS at 07:35

## 2025-02-10 RX ADMIN — ALBUMIN (HUMAN) 12.5 G: 12.5 INJECTION, SOLUTION INTRAVENOUS at 08:18

## 2025-02-10 RX ADMIN — LIDOCAINE HYDROCHLORIDE 100 MG: 20 INJECTION, SOLUTION EPIDURAL; INFILTRATION; INTRACAUDAL; PERINEURAL at 07:35

## 2025-02-10 RX ADMIN — SODIUM CHLORIDE, POTASSIUM CHLORIDE, SODIUM LACTATE AND CALCIUM CHLORIDE: 600; 310; 30; 20 INJECTION, SOLUTION INTRAVENOUS at 10:03

## 2025-02-10 RX ADMIN — PROPOFOL 150 MG: 10 INJECTION, EMULSION INTRAVENOUS at 07:35

## 2025-02-10 RX ADMIN — SODIUM CHLORIDE, POTASSIUM CHLORIDE, SODIUM LACTATE AND CALCIUM CHLORIDE: 600; 310; 30; 20 INJECTION, SOLUTION INTRAVENOUS at 06:32

## 2025-02-10 RX ADMIN — METRONIDAZOLE 500 MG: 5 INJECTION, SOLUTION INTRAVENOUS at 07:45

## 2025-02-10 RX ADMIN — DEXAMETHASONE SODIUM PHOSPHATE 4 MG: 4 INJECTION INTRA-ARTICULAR; INTRALESIONAL; INTRAMUSCULAR; INTRAVENOUS; SOFT TISSUE at 08:42

## 2025-02-10 RX ADMIN — PHENYLEPHRINE HYDROCHLORIDE 120 MCG: 10 INJECTION INTRAVENOUS at 07:45

## 2025-02-10 RX ADMIN — KETOROLAC TROMETHAMINE 30 MG: 30 INJECTION, SOLUTION INTRAMUSCULAR at 09:44

## 2025-02-10 RX ADMIN — MIDAZOLAM 2 MG: 1 INJECTION INTRAMUSCULAR; INTRAVENOUS at 07:27

## 2025-02-10 RX ADMIN — ACETAMINOPHEN 1000 MG: 500 TABLET ORAL at 14:45

## 2025-02-10 RX ADMIN — ONDANSETRON 4 MG: 2 INJECTION INTRAMUSCULAR; INTRAVENOUS at 09:25

## 2025-02-10 RX ADMIN — FENTANYL CITRATE 50 MCG: 50 INJECTION INTRAMUSCULAR; INTRAVENOUS at 08:09

## 2025-02-10 RX ADMIN — ROCURONIUM BROMIDE 5 MG: 10 INJECTION, SOLUTION INTRAVENOUS at 07:35

## 2025-02-10 RX ADMIN — ACETAMINOPHEN 1000 MG: 500 TABLET ORAL at 06:36

## 2025-02-10 RX ADMIN — PHENYLEPHRINE HYDROCHLORIDE 120 MCG: 10 INJECTION INTRAVENOUS at 08:17

## 2025-02-10 RX ADMIN — WATER 2000 MG: 1 INJECTION INTRAMUSCULAR; INTRAVENOUS; SUBCUTANEOUS at 07:54

## 2025-02-10 RX ADMIN — Medication 160 MG: at 07:36

## 2025-02-10 RX ADMIN — SUGAMMADEX 200 MG: 100 INJECTION, SOLUTION INTRAVENOUS at 09:44

## 2025-02-10 ASSESSMENT — PAIN - FUNCTIONAL ASSESSMENT: PAIN_FUNCTIONAL_ASSESSMENT: 0-10

## 2025-02-10 NOTE — INTERVAL H&P NOTE
Update History & Physical    The patient's History and Physical of January 29, 2025 was reviewed with the patient and I examined the patient. There was no change. The surgical site was confirmed by the patient and me. Plan to proceed with TLH/BSO/resection of mass with frozen pathology and proceeding with surgical staging if indicated.    Plan: The risks, benefits, expected outcome, and alternative to the recommended procedure have been discussed with the patient. Patient understands and wants to proceed with the procedure.     Electronically signed by Yunior Campo MD on 2/10/2025 at 6:57 AM

## 2025-02-10 NOTE — OP NOTE
Gynecologic Oncology Operative Report    Ingrid Sanchez  2/10/2025    PREOPERATIVE DIAGNOSIS:  1) Bilateral ovarian masses; 2) Uterine fibroids    POSTOPERATIVE DIAGNOSIS:  1) Bilateral ovarian masses; 2) Uterine fibroids    PROCEDURE:    Total laparoscopic hysterectomy with bilateral salpingo-oophorectomy, resection of mass    Surgeon:  Yunior Campo MD    Assistant:  Alexia Ivory surgical assistance was required throughout the case due to the complexity of the procedure.  Parminder assisted with dissection, development of the retroperitoneal spaces, and identification of pertinent anatomy during the procedure.      Anesthesia:  General endotracheal anesthesia    EBL:  <10 cc     Preoperative antibiotics: Cefoxitan 2grams     VTE Prophylaxis: SCDs     Complications:  None    Implants: none    Specimens:  Uterus, cervix, bilateral fallopian tubes and ovaries, pelvic mass    Operative indications:  69 y.o. female with bilateral ovarian masses and uterine fibroids     Operative findings: On laparoscopic survey, bilateral ovaries are enlarged at approximately 5 to 6 cm.  Uterus small fibroids about the anterior uterine fundus.  Normal-appearing rectosigmoid colon, omentum, small bowel, diaphragm, and liver edge.  There are some fatty deposits around the liver.  Frozen section of the ovary was benign.    Operative note:  After the risks, benefits, indications, and alternatives of the procedure were discussed with the patient and informed consent was obtained, the patient was taken to the operating room.  She was positively identified, administered general anesthesia, and then placed in the dorsal lithotomy position in [facility].  An exam under anesthesia was performed with the above findings.  She was then prepped and draped in the usual fashion.  A hamilton catheter was inserted. A speculum was placed in the vagina and the anterior lip of the cervix was grasped with a tenaculum and then dilated. Then a

## 2025-02-10 NOTE — PERIOP NOTE
TRANSFER - OUT REPORT:    Verbal report given to ALEXSANDER Jean on Ingrid Sanchez  being transferred to [facility] for routine progression of patient care       Report consisted of patient's Situation, Background, Assessment and   Recommendations(SBAR).     Information from the following report(s) Surgery Report and MAR was reviewed with the receiving nurse.           Lines:   Peripheral IV 02/10/25 Left Wrist (Active)   Site Assessment Clean, dry & intact 02/10/25 1030   Line Status Infusing 02/10/25 1030   Line Care Connections checked and tightened 02/10/25 1030   Phlebitis Assessment No symptoms 02/10/25 1030   Infiltration Assessment 0 02/10/25 1030   Alcohol Cap Used Yes 02/10/25 1030   Dressing Status Clean, dry & intact 02/10/25 1030   Dressing Type Transparent 02/10/25 1030       Peripheral IV 02/10/25 Right Wrist (Active)   Site Assessment Clean, dry & intact 02/10/25 1030   Line Status Normal saline locked 02/10/25 1030   Line Care Connections checked and tightened 02/10/25 1030   Phlebitis Assessment No symptoms 02/10/25 1030   Infiltration Assessment 0 02/10/25 1030   Alcohol Cap Used Yes 02/10/25 1030   Dressing Status Clean, dry & intact 02/10/25 1030   Dressing Type Transparent 02/10/25 1030        Opportunity for questions and clarification was provided.      Patient transported with:  O2 @ 2lpm and Tech    Pt family updated.

## 2025-02-10 NOTE — PROGRESS NOTES
TRANSFER - IN REPORT:    Verbal report received from WAYNE Calloway RN on Ingrid Sanchez  being received from PACU for routine post-op      Report consisted of patient's Situation, Background, Assessment and   Recommendations(SBAR).     Information from the following report(s) Nurse Handoff Report, Index, Adult Overview, Surgery Report, Intake/Output, MAR, and Recent Results was reviewed with the receiving nurse.    Opportunity for questions and clarification was provided.      Assessment completed upon patient's arrival to unit and care assumed.      1603- I have reviewed discharge instructions with patient. Patient verbalized understanding and all questions answered

## 2025-02-10 NOTE — ANESTHESIA POSTPROCEDURE EVALUATION
Post-Anesthesia Evaluation and Assessment    Patient: Ingrid Sanchez MRN: [id number]  SSN: [id number]    YOB: 1955  Age: 69 y.o.  Sex: female      I have evaluated the patient and they are stable and ready for discharge from the PACU.     Cardiovascular Function/Vital Signs  Visit Vitals  /66   Pulse 78   Temp 97.7 °F (36.5 °C) (Oral)   Resp 13   Ht 1.626 m (5' 4\")   Wt 75.1 kg (165 lb 9.1 oz)   SpO2 98%   BMI 28.42 kg/m²       Patient is status post General anesthesia for Procedure(s):  DIAGNOSTIC LAPAROSCOPY, RESECTION OF MASS, BILATERAL SALPINGO-OOPHORECTOMY, HYSTERECTOMY.    Nausea/Vomiting: None    Postoperative hydration reviewed and adequate.    Pain:      Managed    Neurological Status:       At baseline    Mental Status, Level of Consciousness: Alert and  oriented to person, place, and time    Pulmonary Status:       Adequate oxygenation and airway patent    Complications related to anesthesia: None    Post-anesthesia assessment completed. No concerns    Signed By: Tyler Kilpatrick MD     February 10, 2025

## 2025-02-10 NOTE — PERIOP NOTE
Surgicel fibrillar used during surgical procedure.   REF #: [id number]  LOT #: [id number]  EXP: 11/30/2026

## 2025-02-10 NOTE — ANESTHESIA PRE PROCEDURE
CO2 33 02/03/2025 03:26 PM    BUN 18 02/03/2025 03:26 PM    CREATININE 0.85 02/03/2025 03:26 PM    GFRAA >60 09/29/2022 11:15 AM    AGRATIO 1.2 09/29/2022 11:15 AM    LABGLOM 74 02/03/2025 03:26 PM    LABGLOM >60 06/09/2023 10:50 AM    GLUCOSE 137 02/03/2025 03:26 PM    CALCIUM 10.3 02/03/2025 03:26 PM    BILITOT 0.6 02/03/2025 03:26 PM    ALKPHOS 95 02/03/2025 03:26 PM    ALKPHOS 124 09/29/2022 11:15 AM    AST 18 02/03/2025 03:26 PM    ALT 26 02/03/2025 03:26 PM       POC Tests: No results for input(s): \"POCGLU\", \"POCNA\", \"POCK\", \"POCCL\", \"POCBUN\", \"POCHEMO\", \"POCHCT\" in the last 72 hours.    Coags: No results found for: \"PROTIME\", \"INR\", \"APTT\"    HCG (If Applicable): No results found for: \"PREGTESTUR\", \"PREGSERUM\", \"HCG\", \"HCGQUANT\"     ABGs: No results found for: \"PHART\", \"PO2ART\", \"JAJ4AWJ\", \"VAF9IEY\", \"BEART\", \"Q5ETFXUU\"     Type & Screen (If Applicable):  No results found for: \"ABORH\", \"LABANTI\"    Drug/Infectious Status (If Applicable):  Lab Results   Component Value Date/Time    HEPCAB NONREACTIVE 07/14/2020 12:02 PM       COVID-19 Screening (If Applicable): No results found for: \"COVID19\"        Anesthesia Evaluation  Patient summary reviewed and Nursing notes reviewed  Airway: Mallampati: II  TM distance: >3 FB   Neck ROM: full  Mouth opening: > = 3 FB   Dental: normal exam         Pulmonary:normal exam    (+)           asthma:                            Cardiovascular:  Exercise tolerance: good (>4 METS)  (+) hypertension:        Rhythm: regular  Rate: normal                    Neuro/Psych:   (+) psychiatric history:            GI/Hepatic/Renal: Neg GI/Hepatic/Renal ROS            Endo/Other:                      ROS comment: Pelvic mass Abdominal:             Vascular: negative vascular ROS.         Other Findings:       Anesthesia Plan      general     ASA 2       Induction: intravenous.    MIPS: Postoperative opioids intended and Prophylactic antiemetics administered.  Anesthetic plan and risks

## 2025-02-10 NOTE — DISCHARGE INSTRUCTIONS
[facility] GYNECOLOGIC ONCOLOGY  [address] [place] [address], [address]  [place] [address]  P [phone]  F [phone]       YOUR DISCHARGE INSTRUCTIONS      Dear Ms. Ingrid Sanchez,      Please review your instructions with your nurse and ask any questions so you have all the information you need to recover well at home.  If you do not feel you have everything you need to succeed and be safe after you leave the hospital, please discuss these concerns with your nurse.  As always, call for any questions at home.    Take Home Medications     See Discharge Medication Review provided to you by your nurse. If you did not receive one, request this prior to your discharge.    It is important that you take your medications as they are prescribed.  Your prescriptions are sent to your pharmacy on record.   Keep your medications in the bottles provided by the pharmacist and keep a list of the medication names, dosages, times to be taken and what they are for in your wallet.   Do not take other medications without consulting your doctor.   If you are prescribed enoxaparin (Lovenox) or Apixaban (Eliquis) following your surgery and are taking a baby aspirin daily, please stop taking the Aspirin until your course of enoxaparin/Eliquis is completed.  You may take Tylenol and Ibuprofen or Aleve for pain.  If this is not sufficient to control your pain, Tramadol or another pain medication will be prescribed for you.   You should take a daily gentle stool softener such as a colace pill or miralax for constipation as this is not uncommon after surgery and/or while on pain medication. If not prescribed, this can be found over the counter. If constipation persists for >24 hours you should take a dose of Milk of Magnesia. Call if your constipation continues.    Diet    Stay hydrated and drink fluids such as gatorade and water. This will also help prevent constipation and dehydration. Limit any usual caffeine intake such as soda, tea

## 2025-02-13 RX ORDER — HYDROCHLOROTHIAZIDE 25 MG/1
25 TABLET ORAL DAILY
Qty: 90 TABLET | Refills: 1 | Status: SHIPPED | OUTPATIENT
Start: 2025-02-13

## 2025-02-13 RX ORDER — AMLODIPINE BESYLATE 5 MG/1
5 TABLET ORAL DAILY
Qty: 90 TABLET | Refills: 1 | Status: SHIPPED | OUTPATIENT
Start: 2025-02-13

## 2025-02-13 RX ORDER — POTASSIUM CHLORIDE 750 MG/1
10 TABLET, EXTENDED RELEASE ORAL 2 TIMES DAILY
Qty: 180 TABLET | Refills: 1 | Status: SHIPPED | OUTPATIENT
Start: 2025-02-13

## 2025-02-13 RX ORDER — VENLAFAXINE HYDROCHLORIDE 37.5 MG/1
37.5 CAPSULE, EXTENDED RELEASE ORAL DAILY
Qty: 90 CAPSULE | Refills: 1 | Status: SHIPPED | OUTPATIENT
Start: 2025-02-13

## 2025-03-25 ENCOUNTER — OFFICE VISIT (OUTPATIENT)
Age: 70
End: 2025-03-25

## 2025-03-25 VITALS
SYSTOLIC BLOOD PRESSURE: 156 MMHG | HEIGHT: 64 IN | WEIGHT: 168.65 LBS | BODY MASS INDEX: 28.79 KG/M2 | HEART RATE: 85 BPM | DIASTOLIC BLOOD PRESSURE: 82 MMHG

## 2025-03-25 DIAGNOSIS — R19.00 PELVIC MASS: Primary | ICD-10-CM

## 2025-03-25 PROCEDURE — 99024 POSTOP FOLLOW-UP VISIT: CPT | Performed by: OBSTETRICS & GYNECOLOGY

## 2025-03-25 ASSESSMENT — PATIENT HEALTH QUESTIONNAIRE - PHQ9
SUM OF ALL RESPONSES TO PHQ QUESTIONS 1-9: 0
1. LITTLE INTEREST OR PLEASURE IN DOING THINGS: NOT AT ALL
2. FEELING DOWN, DEPRESSED OR HOPELESS: NOT AT ALL
SUM OF ALL RESPONSES TO PHQ QUESTIONS 1-9: 0

## 2025-03-25 NOTE — PROGRESS NOTES
Post op Visit, surgery was on 2/10/25    1. Have you been to the ER, urgent care clinic since your last visit?  Hospitalized since your last visit?  Yes, surgery with Dr. Campo on 2/10/25    2. Have you seen or consulted any other health care providers outside of the [facility] since your last visit?  Include any pap smears or colon screening.   no

## 2025-03-25 NOTE — PROGRESS NOTES
[facility] GYNECOLOGIC ONCOLOGY  [address], [address]  [place] [address]  P [phone]  F [phone]    Office Note  Patient ID:  Name:  Ingrid Sanchez  MRN:  [id number]  :  1955/69 y.o.  Date:  3/25/2025      HISTORY OF PRESENT ILLNESS:  Ms. Ingrid Sanchez is a 69 y.o. female who presents as a new patient from Dr. Morales for ovarian mass.     Patient was seen in the ER and [place], [place] at [facility].  The patient reports that she started to have lower right abdominal pain.  CT A/P on 2025 had the below results, which included a \"    Pathology Review:  2/10/2025:  FINAL PATHOLOGIC DIAGNOSIS   1.  Right fallopian tube and ovary; right salpingo-oophorectomy:        Benign ovary with cellular fibroma (5.2 cm in greatest dimension).   Benign falopian tube with paratubal cyst.   2.  Left fallopian tube and ovary; left salpingo-oophorectomy:        Benign ovary with benign serous cystadenoma.   Benign fallopian tube with small paratubal cyst.   3.  Uterus and cervix; total hysterectomy:        Benign inactive pattern endometrium.   Benign myometrium with intramural leiomyomata (up to 1.8 cm in greatest   dimension).   Benign ectocervical and endocervical mucosa with nabothian cyst.     Imaging Review:   CT A/P on 2025:  Impression:  1.    There is focal inflammatory stranding of the mesenteric fat in the anterior right mid abdomen. This is inferior to the proximal transverse colon. There is mild wall thickening of the transverse colon in this region, however the bowel is decompressed. The findings most likely represent epiploic appendicitis. Differential diagnosis would include mild acute diverticulitis since there is underlying mild scattered diverticulosis, however this is less likely giving the appearance and there is no evidence of extraluminal gas or abscess.    2.    There are numerous hepatic cysts noted measuring up to 5.4 cm. There are also bilateral renal

## 2025-03-28 ENCOUNTER — OFFICE VISIT (OUTPATIENT)
Age: 70
End: 2025-03-28
Payer: MEDICARE

## 2025-03-28 VITALS
SYSTOLIC BLOOD PRESSURE: 138 MMHG | TEMPERATURE: 98.2 F | BODY MASS INDEX: 29.28 KG/M2 | RESPIRATION RATE: 16 BRPM | DIASTOLIC BLOOD PRESSURE: 76 MMHG | HEART RATE: 82 BPM | OXYGEN SATURATION: 99 % | WEIGHT: 170.6 LBS

## 2025-03-28 DIAGNOSIS — L98.9 SKIN LESION: Primary | ICD-10-CM

## 2025-03-28 DIAGNOSIS — L01.00 IMPETIGO: ICD-10-CM

## 2025-03-28 PROCEDURE — 99213 OFFICE O/P EST LOW 20 MIN: CPT

## 2025-03-28 RX ORDER — MUPIROCIN 20 MG/G
OINTMENT TOPICAL
Qty: 15 G | Refills: 0 | Status: SHIPPED | OUTPATIENT
Start: 2025-03-28 | End: 2025-04-04

## 2025-03-28 ASSESSMENT — PATIENT HEALTH QUESTIONNAIRE - PHQ9
1. LITTLE INTEREST OR PLEASURE IN DOING THINGS: NOT AT ALL
SUM OF ALL RESPONSES TO PHQ QUESTIONS 1-9: 0
SUM OF ALL RESPONSES TO PHQ QUESTIONS 1-9: 0
2. FEELING DOWN, DEPRESSED OR HOPELESS: NOT AT ALL
SUM OF ALL RESPONSES TO PHQ QUESTIONS 1-9: 0
SUM OF ALL RESPONSES TO PHQ QUESTIONS 1-9: 0

## 2025-03-28 NOTE — PROGRESS NOTES
Room 25    Patient is accompanied by self.     I have received verbal consent from Ingrid Sanchez to discuss any/all medical information while they are present in the room.    Identified pt with two pt identifiers(name and ). Reviewed record in preparation for visit and have obtained necessary documentation.  Chief Complaint   Patient presents with    Skin Problem   Left Breast, below nipple, drainage noted staring 2025, minimal yellow/sticky drainage   Dermatology Appointment scheduled for next month    Health Maintenance Due   Topic    Pneumococcal 50+ years Vaccine (3 of 3 - PCV20 or PCV21)       Vitals:    25 1426   BP: 138/76   BP Site: Left Upper Arm   Patient Position: Sitting   BP Cuff Size: Medium Adult   Pulse: 82   Resp: 16   Temp: 98.2 °F (36.8 °C)   TempSrc: Temporal   SpO2: 99%   Weight: 77.4 kg (170 lb 9.6 oz)         Coordination of Care Questionnaire:       \"Have you been to the ER, urgent care clinic since your last visit?  Hospitalized since your last visit?\"    YES - When: approximately 02/10/2025 ago.  Where and Why: [facility] - Total Hysterectomy.    “Have you seen or consulted any other health care providers outside of [facility] since your last visit?”    NO          Click Here for Release of Records Request

## 2025-03-29 NOTE — PROGRESS NOTES
History of Present Illness:    Ingrid Sanchez is a 69 y.o. female who presents for skin lesion \    Red circular lesion below nipple of left breast  Noticed it several days ago, but yesterday she noticed a clear yellow discharge from the lesion pn the inside of her bra  Denies any irritation or injury to the area  She is up to date on mammograms --- last one 1/28/25 was normal  She has a h/o multiple basal cell carcinomas, with some requiring mohs surgery   She has an appt with her dermatologist in 3 weeks        Physical Examination:     /76 (BP Site: Left Upper Arm, Patient Position: Sitting, BP Cuff Size: Medium Adult)   Pulse 82   Temp 98.2 °F (36.8 °C) (Temporal)   Resp 16   Wt 77.4 kg (170 lb 9.6 oz)   SpO2 99%   BMI 29.28 kg/m²     GEN: No apparent distress. Alert and oriented and responds to all questions appropriately.  EYES:  Conjunctiva clear; extraocular movements are intact  EAR: External ears are normal.         LUNGS: Respirations unlabored   NEUROLOGIC:  No focal neurologic deficits. Coordination and gait grossly intact.   EXT: Well perfused. No edema.  SKIN: small annular erythematous macule inferior to left nipple. No discharge, crusting, or scale.        Past Medical History:   Diagnosis Date    Asthma     currently no inhalers    Hypertension     Pelvic mass     Pneumonia 09/21/2017       Current Outpatient Medications   Medication Sig Dispense Refill    mupirocin (BACTROBAN) 2 % ointment Apply topically 3 times daily. 15 g 0    potassium chloride (KLOR-CON M) 10 MEQ extended release tablet TAKE ONE TABLET BY MOUTH TWICE A  tablet 1    venlafaxine (EFFEXOR XR) 37.5 MG extended release capsule TAKE ONE CAPSULE BY MOUTH ONE TIME DAILY 90 capsule 1    hydroCHLOROthiazide (HYDRODIURIL) 25 MG tablet TAKE ONE TABLET BY MOUTH ONE TIME DAILY 90 tablet 1    amLODIPine (NORVASC) 5 MG tablet TAKE ONE TABLET BY MOUTH ONE TIME DAILY 90 tablet 1    docusate sodium (COLACE) 100 MG

## 2025-08-29 RX ORDER — POTASSIUM CHLORIDE 750 MG/1
10 TABLET, EXTENDED RELEASE ORAL 2 TIMES DAILY
Qty: 180 TABLET | Refills: 1 | Status: SHIPPED | OUTPATIENT
Start: 2025-08-29

## 2025-08-29 RX ORDER — VENLAFAXINE HYDROCHLORIDE 37.5 MG/1
37.5 CAPSULE, EXTENDED RELEASE ORAL DAILY
Qty: 90 CAPSULE | Refills: 1 | Status: SHIPPED | OUTPATIENT
Start: 2025-08-29

## 2025-08-29 RX ORDER — HYDROCHLOROTHIAZIDE 25 MG/1
25 TABLET ORAL DAILY
Qty: 90 TABLET | Refills: 1 | Status: SHIPPED | OUTPATIENT
Start: 2025-08-29

## 2025-08-29 RX ORDER — VENLAFAXINE HYDROCHLORIDE 37.5 MG/1
37.5 CAPSULE, EXTENDED RELEASE ORAL DAILY
Qty: 90 CAPSULE | Refills: 1 | OUTPATIENT
Start: 2025-08-29

## 2025-08-29 RX ORDER — AMLODIPINE BESYLATE 5 MG/1
5 TABLET ORAL DAILY
Qty: 90 TABLET | Refills: 1 | Status: SHIPPED | OUTPATIENT
Start: 2025-08-29

## (undated) DEVICE — CUFF BLD PRSS AD CLTH SGL TB W/ BAYNT CONN ROUNDED CORNER

## (undated) DEVICE — INJECTION THERAPY NEEDLE CATHETER: Brand: INTERJECT

## (undated) DEVICE — TRAP ENDOSCP POLYP 2 CHMBR DRAWER TYP

## (undated) DEVICE — GLOVE ORANGE PI 7 1/2   MSG9075

## (undated) DEVICE — CONTAINER SPEC 20 ML LID NEUT BUFF FORMALIN 10 % POLYPR STS

## (undated) DEVICE — SYSTEM EVAC SMOKE LAPARSCOPIC

## (undated) DEVICE — SNARE ENDOSCP M L240CM W27MM SHTH DIA2.4MM CHN 2.8MM OVL

## (undated) DEVICE — ENDOSCOPIC KIT COMPLIANCE ENDOKIT

## (undated) DEVICE — TIP SUCT TRNSPAR RIB SURF STD BLB RIG NVENT W/ 5IN1 CONN DYND50138] MEDLINE INDUSTRIES INC]

## (undated) DEVICE — IV START KIT: Brand: MEDLINE

## (undated) DEVICE — COVER LT HNDL PLAS RIG 1 PER PK

## (undated) DEVICE — AGENT HEMSTAT W4XL4IN OXIDIZED REGENERATED CELOS ABSRB SFT

## (undated) DEVICE — BLADE,CARBON-STEEL,15,STRL,DISPOSABLE,TB: Brand: MEDLINE

## (undated) DEVICE — TROCAR ENDOSCP L100MM DIA5MM BLDELSS STBL SL THRD OPT VW

## (undated) DEVICE — BLADE CLIPPER GEN PURP NS

## (undated) DEVICE — SET GRAV CK VLV NEEDLESS ST 3 GANGED 4WAY STPCOCK HI FLO 10

## (undated) DEVICE — GLOVE ORANGE PI 7   MSG9070

## (undated) DEVICE — SUTURE VICRYL + SZ 0 L27IN ABSRB VLT L26MM UR-6 5/8 CIR VCP603H

## (undated) DEVICE — X-RAY DETECTABLE SPONGES,16 PLY: Brand: VISTEC

## (undated) DEVICE — SUTURE MONOCRYL + SZ 4-0 L27IN ABSRB UD L19MM PS-2 3/8 CIR MCP426H

## (undated) DEVICE — SOLUTION IV 1000 ML 0.9 NACL INJ USP EXCEL PLAS CONTAINER

## (undated) DEVICE — GLOVE SURG SZ 7 L12IN FNGR THK79MIL GRN LTX FREE

## (undated) DEVICE — TOWEL,OR,DSP,ST,BLUE,STD,4/PK,20PK/CS: Brand: MEDLINE

## (undated) DEVICE — TROCAR ENDOSCP L100MM DIA5MM BLDELSS STBL SL OBT RADLUC

## (undated) DEVICE — SOLUTION IRRIG 1000ML 09% SOD CHL USP PIC PLAS CONTAINER

## (undated) DEVICE — GYN LAPAROSCOPY - SMH: Brand: MEDLINE INDUSTRIES, INC.

## (undated) DEVICE — TROCARS: Brand: KII® BALLOON BLUNT TIP SYSTEM

## (undated) DEVICE — PAD PT POS 36 IN SURGYPAD DISP

## (undated) DEVICE — SNARE ENDOSCP POLYP MED STD AD 2.4X27X240 CM 2.8 MM OVL SENS

## (undated) DEVICE — TRANSFER SET 3": Brand: MEDLINE INDUSTRIES, INC.